# Patient Record
Sex: FEMALE | Race: WHITE | NOT HISPANIC OR LATINO | Employment: PART TIME | URBAN - NONMETROPOLITAN AREA
[De-identification: names, ages, dates, MRNs, and addresses within clinical notes are randomized per-mention and may not be internally consistent; named-entity substitution may affect disease eponyms.]

---
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
## 2020-01-08 DIAGNOSIS — F90.9 ATTENTION DEFICIT HYPERACTIVITY DISORDER (ADHD), UNSPECIFIED ADHD TYPE: ICD-10-CM

## 2020-01-08 RX ORDER — DEXTROAMPHETAMINE SACCHARATE, AMPHETAMINE ASPARTATE MONOHYDRATE, DEXTROAMPHETAMINE SULFATE AND AMPHETAMINE SULFATE 3.75; 3.75; 3.75; 3.75 MG/1; MG/1; MG/1; MG/1
15 CAPSULE, EXTENDED RELEASE ORAL DAILY
Qty: 30 CAPSULE | Refills: 0 | Status: SHIPPED | OUTPATIENT
Start: 2020-01-08 | End: 2020-01-21

## 2020-01-08 NOTE — TELEPHONE ENCOUNTER
Not on protocol, please advise.    amphetamine-dextroamphetamine (ADDERALL XR) 15 MG 24 hr capsule      Last Written Prescription Date:  12/6/19-1/5/19  Last Fill Quantity: 30,   # refills: 0  Last Office Visit: 10/21/19  Future Office visit:    Next 5 appointments (look out 90 days)    Jan 21, 2020  3:30 PM CST  (Arrive by 3:15 PM)  SHORT with Lissy Perez NP  [facility] ([facility] ) [address]  [place] [address]  [phone]           Routing refill request to provider for review/approval because:  Drug not on the [facility], P or [facility] refill protocol or controlled substance

## 2020-01-15 NOTE — PROGRESS NOTES
Subjective     Radha Mayfield is a 19 year old female who presents to clinic today for the following health issues:    HPI   Medication Followup of  Adderall 15 mg     Taking Medication as prescribed: yes    Side Effects:  None; no chest pain, shortness of breath, dizziness, syncope, insomnia, or palpitations, weight down slightly, but she recently started yoga    Medication Helping Symptoms:  yes    She denies side effects from the medication. No chest pain, shortness of breath, palpitations, insomnia, or tics. She notes that with the medication, she is able to focus better. 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 School is going well. Grades good. Work is also going well.         Patient Active Problem List   Diagnosis     Attention deficit hyperactivity disorder (ADHD)     H/O absence seizures     Past Surgical History:   Procedure Laterality Date     APPENDECTOMY       TONSILLECTOMY & ADENOIDECTOMY         Social History     Tobacco Use     Smoking status: Never Smoker     Smokeless tobacco: Never Used   Substance Use Topics     Alcohol use: Not on file     Family History   Problem Relation Age of Onset     Scoliosis Mother      Cardiovascular Mother      No Known Problems Father          Current Outpatient Medications   Medication Sig Dispense Refill     [START ON 4/13/2020] amphetamine-dextroamphetamine (ADDERALL XR) 15 MG 24 hr capsule Take 1 capsule (15 mg) by mouth daily 30 capsule 0     [START ON 3/13/2020] amphetamine-dextroamphetamine (ADDERALL XR) 15 MG 24 hr capsule Take 1 capsule (15 mg) by mouth daily 30 capsule 0     [START ON 2/11/2020] amphetamine-dextroamphetamine (ADDERALL XR) 15 MG 24 hr capsule Take 1 capsule (15 mg) by mouth daily 30 capsule 0     Allergies   Allergen Reactions     No Clinical Screening - See Comments      Oxcarbazepine      rash     Chlorine Rash       Reviewed and updated as needed this visit by Provider  Meds  Problems      "    Review of Systems   As noted in the HPI.       Objective    /74 (BP Location: Left arm, Patient Position: Sitting, Cuff Size: Adult Regular)   Pulse 80   Temp 98.1  F (36.7  C) (Tympanic)   Ht 1.6 m (5' 3\")   Wt 65.8 kg (145 lb)   LMP 01/14/2020   SpO2 99%   BMI 25.69 kg/m    Body mass index is 25.69 kg/m .  Physical Exam   GENERAL: healthy, alert and no distress  RESP: lungs clear to auscultation - no rales, rhonchi or wheezes  CV: regular rate and rhythm, normal S1 S2, no S3 or S4, no murmur, click or rub, no peripheral edema and peripheral pulses strong  NEURO: Normal strength and tone, mentation intact and speech normal  PSYCH: mentation appears normal, affect normal/bright    Diagnostic Test Results:  none         Assessment & Plan   (F90.9) Attention deficit hyperactivity disorder (ADHD), unspecified ADHD type  (primary encounter diagnosis)  Comment: well controlled with Adderall  Plan: Pt shows no signs of diversion or abuse. Up to 3 month of his/her ADD/ADHD medications given today. Pt is aware that he/she is solely responsible for protections of the prescriptions. I will not tolerate any lost or stolen prescriptions lightly. Will see pt back in office in 3 months, sooner with worsening symptoms.          BMI:   Estimated body mass index is 26.57 kg/m  as calculated from the following:    Height as of 10/21/19: 1.6 m (5' 3\").    Weight as of 10/21/19: 68 kg (150 lb).       Lissy Perez NP  [facility] [place]      "

## 2020-01-21 ENCOUNTER — OFFICE VISIT (OUTPATIENT)
Dept: FAMILY MEDICINE | Facility: OTHER | Age: 20
End: 2020-01-21
Attending: NURSE PRACTITIONER
Payer: COMMERCIAL

## 2020-01-21 VITALS
HEIGHT: 63 IN | BODY MASS INDEX: 25.69 KG/M2 | WEIGHT: 145 LBS | OXYGEN SATURATION: 99 % | SYSTOLIC BLOOD PRESSURE: 104 MMHG | HEART RATE: 80 BPM | DIASTOLIC BLOOD PRESSURE: 74 MMHG | TEMPERATURE: 98.1 F

## 2020-01-21 DIAGNOSIS — F90.9 ATTENTION DEFICIT HYPERACTIVITY DISORDER (ADHD), UNSPECIFIED ADHD TYPE: Primary | ICD-10-CM

## 2020-01-21 PROCEDURE — G0463 HOSPITAL OUTPT CLINIC VISIT: HCPCS

## 2020-01-21 PROCEDURE — 99213 OFFICE O/P EST LOW 20 MIN: CPT | Performed by: NURSE PRACTITIONER

## 2020-01-21 RX ORDER — DEXTROAMPHETAMINE SACCHARATE, AMPHETAMINE ASPARTATE MONOHYDRATE, DEXTROAMPHETAMINE SULFATE AND AMPHETAMINE SULFATE 3.75; 3.75; 3.75; 3.75 MG/1; MG/1; MG/1; MG/1
15 CAPSULE, EXTENDED RELEASE ORAL DAILY
Qty: 30 CAPSULE | Refills: 0 | Status: SHIPPED | OUTPATIENT
Start: 2020-04-13 | End: 2020-04-15

## 2020-01-21 RX ORDER — DEXTROAMPHETAMINE SACCHARATE, AMPHETAMINE ASPARTATE MONOHYDRATE, DEXTROAMPHETAMINE SULFATE AND AMPHETAMINE SULFATE 3.75; 3.75; 3.75; 3.75 MG/1; MG/1; MG/1; MG/1
15 CAPSULE, EXTENDED RELEASE ORAL DAILY
Qty: 30 CAPSULE | Refills: 0 | Status: SHIPPED | OUTPATIENT
Start: 2020-02-11 | End: 2020-03-12

## 2020-01-21 RX ORDER — DEXTROAMPHETAMINE SACCHARATE, AMPHETAMINE ASPARTATE MONOHYDRATE, DEXTROAMPHETAMINE SULFATE AND AMPHETAMINE SULFATE 3.75; 3.75; 3.75; 3.75 MG/1; MG/1; MG/1; MG/1
15 CAPSULE, EXTENDED RELEASE ORAL DAILY
Qty: 30 CAPSULE | Refills: 0 | Status: SHIPPED | OUTPATIENT
Start: 2020-03-13 | End: 2020-04-12

## 2020-01-21 ASSESSMENT — ANXIETY QUESTIONNAIRES
2. NOT BEING ABLE TO STOP OR CONTROL WORRYING: NOT AT ALL
GAD7 TOTAL SCORE: 6
IF YOU CHECKED OFF ANY PROBLEMS ON THIS QUESTIONNAIRE, HOW DIFFICULT HAVE THESE PROBLEMS MADE IT FOR YOU TO DO YOUR WORK, TAKE CARE OF THINGS AT HOME, OR GET ALONG WITH OTHER PEOPLE: SOMEWHAT DIFFICULT
3. WORRYING TOO MUCH ABOUT DIFFERENT THINGS: SEVERAL DAYS
1. FEELING NERVOUS, ANXIOUS, OR ON EDGE: SEVERAL DAYS
7. FEELING AFRAID AS IF SOMETHING AWFUL MIGHT HAPPEN: SEVERAL DAYS
5. BEING SO RESTLESS THAT IT IS HARD TO SIT STILL: SEVERAL DAYS
4. TROUBLE RELAXING: SEVERAL DAYS
6. BECOMING EASILY ANNOYED OR IRRITABLE: SEVERAL DAYS

## 2020-01-21 ASSESSMENT — PATIENT HEALTH QUESTIONNAIRE - PHQ9: SUM OF ALL RESPONSES TO PHQ QUESTIONS 1-9: 3

## 2020-01-21 ASSESSMENT — MIFFLIN-ST. JEOR: SCORE: 1401.85

## 2020-01-21 NOTE — NURSING NOTE
"Chief Complaint   Patient presents with     A.D.H.D       Initial /74 (BP Location: Left arm, Patient Position: Sitting, Cuff Size: Adult Regular)   Pulse 105   Temp 98.1  F (36.7  C) (Tympanic)   Ht 1.6 m (5' 3\")   Wt 65.8 kg (145 lb)   LMP 01/14/2020   SpO2 99%   BMI 25.69 kg/m   Estimated body mass index is 25.69 kg/m  as calculated from the following:    Height as of this encounter: 1.6 m (5' 3\").    Weight as of this encounter: 65.8 kg (145 lb).  Medication Reconciliation: complete  Barbara Sow LPN  "

## 2020-01-22 ASSESSMENT — ANXIETY QUESTIONNAIRES: GAD7 TOTAL SCORE: 6

## 2020-04-14 DIAGNOSIS — F90.9 ATTENTION DEFICIT HYPERACTIVITY DISORDER (ADHD), UNSPECIFIED ADHD TYPE: ICD-10-CM

## 2020-04-14 NOTE — TELEPHONE ENCOUNTER
Adderall      Last Written Prescription Date:  1/21/2020  Last Fill Quantity: 30,   # refills: 0  Last Office Visit: 1/21/2020  Future Office visit:    Next 5 appointments (look out 90 days)    Apr 21, 2020  8:40 AM CDT  Telephone Visit with Lissy Perez NP  [facility] ([facility] ) [address]  [place] [address]  [phone]           Routing refill request to provider for review/approval because:  Drug not on the [facility], [facility] or  Health refill protocol or controlled substance

## 2020-04-15 RX ORDER — DEXTROAMPHETAMINE SACCHARATE, AMPHETAMINE ASPARTATE MONOHYDRATE, DEXTROAMPHETAMINE SULFATE AND AMPHETAMINE SULFATE 3.75; 3.75; 3.75; 3.75 MG/1; MG/1; MG/1; MG/1
15 CAPSULE, EXTENDED RELEASE ORAL DAILY
Qty: 30 CAPSULE | Refills: 0 | Status: SHIPPED | OUTPATIENT
Start: 2020-04-15 | End: 2020-04-21

## 2020-04-20 NOTE — PROGRESS NOTES
"Radha Mayfield is a 20 year old female who is being evaluated via a billable telephone visit.      The patient has been notified of following:     \"This telephone visit will be conducted via a call between you and your physician/provider. We have found that certain health care needs can be provided without the need for a physical exam.  This service lets us provide the care you need with a short phone conversation.  If a prescription is necessary we can send it directly to your pharmacy.  If lab work is needed we can place an order for that and you can then stop by our lab to have the test done at a later time.    Telephone visits are billed at different rates depending on your insurance coverage. During this emergency period, for some insurers they may be billed the same as an in-person visit.  Please reach out to your insurance provider with any questions.    If during the course of the call the physician/provider feels a telephone visit is not appropriate, you will not be charged for this service.\"    Patient has given verbal consent for Telephone visit?  Yes    How would you like to obtain your AVS? Mail a copy    Subjective     Radha Mayfield is a 20 year old female who presents to clinic today for the following health issues:    Medication Followup of  Adderall 15 mg     Taking Medication as prescribed: yes    Side Effects:  None; no chest pain, shortness of breath, dizziness, syncope, insomnia, or palpitations; weight down slightly, but she recently started yoga    Medication Helping Symptoms:  yes     She denies side effects from the medication. No chest pain, shortness of breath, palpitations, insomnia, or tics. She notes that with the medication, she is able to focus better. 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 School is going well. Grades good. Was working, but is currently laid off with the covid-19 pandemic.     Patient Active Problem List   Diagnosis     Attention deficit " hyperactivity disorder (ADHD)     H/O absence seizures     Past Surgical History:   Procedure Laterality Date     APPENDECTOMY       TONSILLECTOMY & ADENOIDECTOMY         Social History     Tobacco Use     Smoking status: Never Smoker     Smokeless tobacco: Never Used   Substance Use Topics     Alcohol use: Not on file     Family History   Problem Relation Age of Onset     Scoliosis Mother      Cardiovascular Mother      No Known Problems Father          Current Outpatient Medications   Medication Sig Dispense Refill     amphetamine-dextroamphetamine (ADDERALL XR) 15 MG 24 hr capsule Take 1 capsule (15 mg) by mouth daily 30 capsule 0     Allergies   Allergen Reactions     No Clinical Screening - See Comments      Oxcarbazepine      rash     Chlorine Rash       Reviewed and updated as needed this visit by Provider         Review of Systems   As noted in the HPI.        Assessment/Plan:  (F90.9) Attention deficit hyperactivity disorder (ADHD), unspecified ADHD type  (primary encounter diagnosis)  Plan: ADHD well controlled. Pt shows no signs of diversion or abuse. Up to 3 month of his/her ADD/ADHD medications given today. Pt is aware that he/she is solely responsible for protections of the prescriptions. I will not tolerate any lost or stolen prescriptions lightly. Will do telephone call in 3 months.      Phone call duration:  5 minutes    Lissy Perez NP

## 2020-04-21 ENCOUNTER — VIRTUAL VISIT (OUTPATIENT)
Dept: FAMILY MEDICINE | Facility: OTHER | Age: 20
End: 2020-04-21
Attending: NURSE PRACTITIONER
Payer: COMMERCIAL

## 2020-04-21 DIAGNOSIS — F90.9 ATTENTION DEFICIT HYPERACTIVITY DISORDER (ADHD), UNSPECIFIED ADHD TYPE: Primary | ICD-10-CM

## 2020-04-21 PROCEDURE — 99441 ZZC PHYSICIAN TELEPHONE EVALUATION 5-10 MIN: CPT | Performed by: NURSE PRACTITIONER

## 2020-04-21 RX ORDER — DEXTROAMPHETAMINE SACCHARATE, AMPHETAMINE ASPARTATE MONOHYDRATE, DEXTROAMPHETAMINE SULFATE AND AMPHETAMINE SULFATE 3.75; 3.75; 3.75; 3.75 MG/1; MG/1; MG/1; MG/1
15 CAPSULE, EXTENDED RELEASE ORAL DAILY
Qty: 30 CAPSULE | Refills: 0 | Status: SHIPPED | OUTPATIENT
Start: 2020-05-12 | End: 2020-07-21

## 2020-04-21 RX ORDER — DEXTROAMPHETAMINE SACCHARATE, AMPHETAMINE ASPARTATE MONOHYDRATE, DEXTROAMPHETAMINE SULFATE AND AMPHETAMINE SULFATE 3.75; 3.75; 3.75; 3.75 MG/1; MG/1; MG/1; MG/1
15 CAPSULE, EXTENDED RELEASE ORAL DAILY
Qty: 30 CAPSULE | Refills: 0 | Status: SHIPPED | OUTPATIENT
Start: 2020-06-12 | End: 2020-07-21

## 2020-04-21 RX ORDER — DEXTROAMPHETAMINE SACCHARATE, AMPHETAMINE ASPARTATE MONOHYDRATE, DEXTROAMPHETAMINE SULFATE AND AMPHETAMINE SULFATE 3.75; 3.75; 3.75; 3.75 MG/1; MG/1; MG/1; MG/1
15 CAPSULE, EXTENDED RELEASE ORAL DAILY
Qty: 30 CAPSULE | Refills: 0 | Status: SHIPPED | OUTPATIENT
Start: 2020-07-10 | End: 2020-07-21

## 2020-04-21 NOTE — NURSING NOTE
"Chief Complaint   Patient presents with     A.D.H.D       Initial There were no vitals taken for this visit. Estimated body mass index is 25.69 kg/m  as calculated from the following:    Height as of 1/21/20: 1.6 m (5' 3\").    Weight as of 1/21/20: 65.8 kg (145 lb).  Medication Reconciliation: complete  Marlene Salgado LPN  "

## 2020-07-20 NOTE — PROGRESS NOTES
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 School is going well. Grades good. Was working, but is currently laid off with the covid-19 pandemic. Plans to restart working once they call her back.     Patient Active Problem List   Diagnosis     Attention deficit hyperactivity disorder (ADHD)     H/O absence seizures     Past Surgical History:   Procedure Laterality Date     APPENDECTOMY       TONSILLECTOMY & ADENOIDECTOMY         Social History     Tobacco Use     Smoking status: Never Smoker     Smokeless tobacco: Never Used   Substance Use Topics     Alcohol use: Not on file     Family History   Problem Relation Age of Onset     Scoliosis Mother      Cardiovascular Mother      No Known Problems Father          Current Outpatient Medications   Medication Sig Dispense Refill     amphetamine-dextroamphetamine (ADDERALL XR) 15 MG 24 hr capsule Take 1 capsule (15 mg) by mouth daily 30 capsule 0     amphetamine-dextroamphetamine (ADDERALL XR) 15 MG 24 hr capsule Take 1 capsule (15 mg) by mouth daily 30 capsule 0     Allergies   Allergen Reactions     No Clinical Screening - See Comments      Oxcarbazepine      rash     Chlorine Rash       Reviewed and updated as needed this visit by Provider  Tobacco         Review of Systems   As noted in the HPI.       Objective    /80 (BP Location: Left arm, Patient Position: Chair, Cuff Size: Adult Regular)   Pulse 88   Temp 97.8  F (36.6  C)  "(Tympanic)   Ht 1.6 m (5' 3\")   Wt 65.8 kg (145 lb)   LMP 07/15/2020   SpO2 98%   BMI 25.69 kg/m    Body mass index is 25.69 kg/m .  Physical Exam   GENERAL: healthy, alert and no distress  RESP: lungs clear to auscultation - no rales, rhonchi or wheezes  CV: regular rate and rhythm, normal S1 S2, no S3 or S4, no murmur, click or rub, no peripheral edema  NEURO: Normal strength and tone, mentation intact and speech normal  PSYCH: mentation appears normal, affect normal/bright    Diagnostic Test Results:  none         Assessment & Plan     1. Attention deficit hyperactivity disorder (ADHD), unspecified ADHD type  Well controlled. Pt shows no signs of diversion or abuse. Up to 3 month of his/her ADD/ADHD medications given today. Pt is aware that he/she is solely responsible for protections of the prescriptions. I will not tolerate any lost or stolen prescriptions lightly. Will see pt back in office in 3 months.     - amphetamine-dextroamphetamine (ADDERALL XR) 15 MG 24 hr capsule; Take 1 capsule (15 mg) by mouth daily  Dispense: 30 capsule; Refill: 0  - amphetamine-dextroamphetamine (ADDERALL XR) 15 MG 24 hr capsule; Take 1 capsule (15 mg) by mouth daily  Dispense: 30 capsule; Refill: 0  - amphetamine-dextroamphetamine (ADDERALL XR) 15 MG 24 hr capsule; Take 1 capsule (15 mg) by mouth daily  Dispense: 30 capsule; Refill: 0     BMI:   Estimated body mass index is 25.69 kg/m  as calculated from the following:    Height as of this encounter: 1.6 m (5' 3\").    Weight as of this encounter: 65.8 kg (145 lb).         Lissy Perez NP  [facility] - [place]    "

## 2020-07-21 ENCOUNTER — OFFICE VISIT (OUTPATIENT)
Dept: FAMILY MEDICINE | Facility: OTHER | Age: 20
End: 2020-07-21
Attending: NURSE PRACTITIONER
Payer: COMMERCIAL

## 2020-07-21 VITALS
TEMPERATURE: 97.8 F | BODY MASS INDEX: 25.69 KG/M2 | OXYGEN SATURATION: 98 % | DIASTOLIC BLOOD PRESSURE: 80 MMHG | HEART RATE: 88 BPM | HEIGHT: 63 IN | SYSTOLIC BLOOD PRESSURE: 110 MMHG | WEIGHT: 145 LBS

## 2020-07-21 DIAGNOSIS — F90.9 ATTENTION DEFICIT HYPERACTIVITY DISORDER (ADHD), UNSPECIFIED ADHD TYPE: Primary | ICD-10-CM

## 2020-07-21 PROCEDURE — G0463 HOSPITAL OUTPT CLINIC VISIT: HCPCS

## 2020-07-21 PROCEDURE — 99213 OFFICE O/P EST LOW 20 MIN: CPT | Performed by: NURSE PRACTITIONER

## 2020-07-21 RX ORDER — DEXTROAMPHETAMINE SACCHARATE, AMPHETAMINE ASPARTATE MONOHYDRATE, DEXTROAMPHETAMINE SULFATE AND AMPHETAMINE SULFATE 3.75; 3.75; 3.75; 3.75 MG/1; MG/1; MG/1; MG/1
15 CAPSULE, EXTENDED RELEASE ORAL DAILY
Qty: 30 CAPSULE | Refills: 0 | Status: SHIPPED | OUTPATIENT
Start: 2020-07-21 | End: 2020-10-21

## 2020-07-21 RX ORDER — DEXTROAMPHETAMINE SACCHARATE, AMPHETAMINE ASPARTATE MONOHYDRATE, DEXTROAMPHETAMINE SULFATE AND AMPHETAMINE SULFATE 3.75; 3.75; 3.75; 3.75 MG/1; MG/1; MG/1; MG/1
15 CAPSULE, EXTENDED RELEASE ORAL DAILY
Qty: 30 CAPSULE | Refills: 0 | Status: SHIPPED | OUTPATIENT
Start: 2020-09-21 | End: 2020-10-21

## 2020-07-21 RX ORDER — DEXTROAMPHETAMINE SACCHARATE, AMPHETAMINE ASPARTATE MONOHYDRATE, DEXTROAMPHETAMINE SULFATE AND AMPHETAMINE SULFATE 3.75; 3.75; 3.75; 3.75 MG/1; MG/1; MG/1; MG/1
15 CAPSULE, EXTENDED RELEASE ORAL DAILY
Qty: 30 CAPSULE | Refills: 0 | Status: SHIPPED | OUTPATIENT
Start: 2020-08-21 | End: 2020-10-21

## 2020-07-21 ASSESSMENT — MIFFLIN-ST. JEOR: SCORE: 1396.85

## 2020-07-21 ASSESSMENT — PAIN SCALES - GENERAL: PAINLEVEL: NO PAIN (0)

## 2020-07-21 NOTE — NURSING NOTE
"Chief Complaint   Patient presents with     A.D.H.D       Initial /80 (BP Location: Left arm, Patient Position: Chair, Cuff Size: Adult Regular)   Pulse 101   Temp 97.8  F (36.6  C) (Tympanic)   Ht 1.6 m (5' 3\")   Wt 65.8 kg (145 lb)   LMP 07/15/2020   SpO2 98%   BMI 25.69 kg/m   Estimated body mass index is 25.69 kg/m  as calculated from the following:    Height as of this encounter: 1.6 m (5' 3\").    Weight as of this encounter: 65.8 kg (145 lb).  Medication Reconciliation: complete  Oksana Colvin LPN  "

## 2020-09-25 ENCOUNTER — TELEPHONE (OUTPATIENT)
Dept: FAMILY MEDICINE | Facility: OTHER | Age: 20
End: 2020-09-25

## 2020-09-25 NOTE — TELEPHONE ENCOUNTER
Form received [employer] service animal paperwork,placed in [facility]'s wall bin.   After form is completed patient would like form to be put at  for , please advise pt when it is ready.

## 2020-09-28 ENCOUNTER — OFFICE VISIT (OUTPATIENT)
Dept: FAMILY MEDICINE | Facility: OTHER | Age: 20
End: 2020-09-28
Attending: NURSE PRACTITIONER
Payer: COMMERCIAL

## 2020-09-28 VITALS
DIASTOLIC BLOOD PRESSURE: 76 MMHG | TEMPERATURE: 97.8 F | WEIGHT: 148 LBS | BODY MASS INDEX: 26.22 KG/M2 | SYSTOLIC BLOOD PRESSURE: 94 MMHG | HEART RATE: 98 BPM | OXYGEN SATURATION: 98 %

## 2020-09-28 DIAGNOSIS — F43.10 PTSD (POST-TRAUMATIC STRESS DISORDER): Primary | ICD-10-CM

## 2020-09-28 DIAGNOSIS — F41.9 ANXIETY: ICD-10-CM

## 2020-09-28 PROCEDURE — 99213 OFFICE O/P EST LOW 20 MIN: CPT | Performed by: NURSE PRACTITIONER

## 2020-09-28 PROCEDURE — G0463 HOSPITAL OUTPT CLINIC VISIT: HCPCS

## 2020-09-28 ASSESSMENT — PATIENT HEALTH QUESTIONNAIRE - PHQ9: SUM OF ALL RESPONSES TO PHQ QUESTIONS 1-9: 8

## 2020-09-28 ASSESSMENT — ANXIETY QUESTIONNAIRES
5. BEING SO RESTLESS THAT IT IS HARD TO SIT STILL: SEVERAL DAYS
6. BECOMING EASILY ANNOYED OR IRRITABLE: SEVERAL DAYS
4. TROUBLE RELAXING: SEVERAL DAYS
GAD7 TOTAL SCORE: 7
IF YOU CHECKED OFF ANY PROBLEMS ON THIS QUESTIONNAIRE, HOW DIFFICULT HAVE THESE PROBLEMS MADE IT FOR YOU TO DO YOUR WORK, TAKE CARE OF THINGS AT HOME, OR GET ALONG WITH OTHER PEOPLE: SOMEWHAT DIFFICULT
3. WORRYING TOO MUCH ABOUT DIFFERENT THINGS: SEVERAL DAYS
1. FEELING NERVOUS, ANXIOUS, OR ON EDGE: SEVERAL DAYS
2. NOT BEING ABLE TO STOP OR CONTROL WORRYING: SEVERAL DAYS
7. FEELING AFRAID AS IF SOMETHING AWFUL MIGHT HAPPEN: SEVERAL DAYS

## 2020-09-28 ASSESSMENT — PAIN SCALES - GENERAL: PAINLEVEL: NO PAIN (0)

## 2020-09-28 NOTE — TELEPHONE ENCOUNTER
Called and left patient message stating she needs a telephone visit for pcp to fill out the form she is requesting. Does have some opening on Friday. awaiting patient call back.   GRACE Hurd
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
## 2020-09-28 NOTE — NURSING NOTE
"Chief Complaint   Patient presents with     Forms       Initial BP 94/76   Pulse 121   Temp 97.8  F (36.6  C) (Tympanic)   Wt 67.1 kg (148 lb)   SpO2 98%   BMI 26.22 kg/m   Estimated body mass index is 26.22 kg/m  as calculated from the following:    Height as of 7/21/20: 1.6 m (5' 3\").    Weight as of this encounter: 67.1 kg (148 lb).  Medication Reconciliation: complete  Viri Guzman MA  "

## 2020-09-28 NOTE — TELEPHONE ENCOUNTER
Appt has been scheduled with Lissy Perez:    Next 5 appointments (look out 90 days)    Sep 28, 2020  1:00 PM CDT  (Arrive by 12:45 PM)  [facility] [place] [facility] [place] [facility] [address]  [place] [address]  [phone]   Oct 21, 2020 10:30 AM CDT  (Arrive by 10:15 AM)  SHORT with Lissy Perez NP  [facility] [place] ([facility] [place] ) [address]  [place] [address]  [phone]

## 2020-09-29 ASSESSMENT — ANXIETY QUESTIONNAIRES: GAD7 TOTAL SCORE: 7

## 2020-10-19 NOTE — PROGRESS NOTES
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 School is going well. Grades good-As and Bs. Was working, but is currently laid off with the covid-19 pandemic. Has another interview today.  -She does have some anxiety and PTSD. Controlled without medication. Uses her cat to help calm her instead.     Due for chlamydia screening. Willing to get.    Declines the influenza vaccine.      Review of Systems   As noted in the HPI.       Objective    BP 96/70 (BP Location: Left arm, Patient Position: Sitting, Cuff Size: Adult Regular)   Pulse 96   Temp 98.4  F (36.9  C) (Tympanic)   Ht 1.6 m (5' 3\")   Wt 68.5 kg (151 lb)   SpO2 98%   BMI 26.75 kg/m    Body mass index is 26.75 kg/m .  Physical Exam   GENERAL: healthy, alert and no distress  RESP: lungs clear to auscultation - no rales, rhonchi or wheezes  CV: regular rate and rhythm, normal S1 S2, no S3 or S4, no murmur, click or rub, no peripheral edema  NEURO: Normal strength and tone, mentation intact and speech normal  PSYCH: mentation appears normal, affect normal/bright    Urine collected for chlamydia screen.         Assessment & Plan     Attention deficit hyperactivity disorder (ADHD), unspecified ADHD type  Pt shows no signs of diversion or abuse. Up to 3 month of his/her ADD/ADHD medications given today. Pt is aware that he/she is solely responsible for protections of the prescriptions. I will not tolerate any lost or stolen prescriptions lightly. Will see pt back in office in 3 months.     - amphetamine-dextroamphetamine " (ADDERALL XR) 15 MG 24 hr capsule; Take 1 capsule (15 mg) by mouth daily  - amphetamine-dextroamphetamine (ADDERALL XR) 15 MG 24 hr capsule; Take 1 capsule (15 mg) by mouth daily  - amphetamine-dextroamphetamine (ADDERALL XR) 15 MG 24 hr capsule; Take 1 capsule (15 mg) by mouth daily    Screen for STD (sexually transmitted disease)  - GC/Chlamydia by PCR - [place],  Will notify patient of the results when available and intervene accordingly.         Return in about 3 months (around 1/21/2021).    Lissy Perez NP  [facility] - [place]

## 2020-10-21 ENCOUNTER — OFFICE VISIT (OUTPATIENT)
Dept: FAMILY MEDICINE | Facility: OTHER | Age: 20
End: 2020-10-21
Attending: NURSE PRACTITIONER
Payer: COMMERCIAL

## 2020-10-21 VITALS
BODY MASS INDEX: 26.75 KG/M2 | HEIGHT: 63 IN | DIASTOLIC BLOOD PRESSURE: 70 MMHG | OXYGEN SATURATION: 98 % | TEMPERATURE: 98.4 F | WEIGHT: 151 LBS | SYSTOLIC BLOOD PRESSURE: 96 MMHG | HEART RATE: 96 BPM

## 2020-10-21 DIAGNOSIS — F90.9 ATTENTION DEFICIT HYPERACTIVITY DISORDER (ADHD), UNSPECIFIED ADHD TYPE: ICD-10-CM

## 2020-10-21 DIAGNOSIS — Z11.3 SCREEN FOR STD (SEXUALLY TRANSMITTED DISEASE): Primary | ICD-10-CM

## 2020-10-21 PROCEDURE — G0463 HOSPITAL OUTPT CLINIC VISIT: HCPCS | Mod: 25

## 2020-10-21 PROCEDURE — G0463 HOSPITAL OUTPT CLINIC VISIT: HCPCS

## 2020-10-21 PROCEDURE — 87491 CHLMYD TRACH DNA AMP PROBE: CPT | Mod: ZL | Performed by: NURSE PRACTITIONER

## 2020-10-21 PROCEDURE — 99213 OFFICE O/P EST LOW 20 MIN: CPT | Performed by: NURSE PRACTITIONER

## 2020-10-21 PROCEDURE — 87591 N.GONORRHOEAE DNA AMP PROB: CPT | Mod: ZL | Performed by: NURSE PRACTITIONER

## 2020-10-21 RX ORDER — DEXTROAMPHETAMINE SACCHARATE, AMPHETAMINE ASPARTATE MONOHYDRATE, DEXTROAMPHETAMINE SULFATE AND AMPHETAMINE SULFATE 3.75; 3.75; 3.75; 3.75 MG/1; MG/1; MG/1; MG/1
15 CAPSULE, EXTENDED RELEASE ORAL DAILY
Qty: 30 CAPSULE | Refills: 0 | Status: SHIPPED | OUTPATIENT
Start: 2021-01-18 | End: 2021-01-29

## 2020-10-21 RX ORDER — DEXTROAMPHETAMINE SACCHARATE, AMPHETAMINE ASPARTATE MONOHYDRATE, DEXTROAMPHETAMINE SULFATE AND AMPHETAMINE SULFATE 3.75; 3.75; 3.75; 3.75 MG/1; MG/1; MG/1; MG/1
15 CAPSULE, EXTENDED RELEASE ORAL DAILY
Qty: 30 CAPSULE | Refills: 0 | Status: SHIPPED | OUTPATIENT
Start: 2020-12-18 | End: 2021-01-29

## 2020-10-21 RX ORDER — DEXTROAMPHETAMINE SACCHARATE, AMPHETAMINE ASPARTATE MONOHYDRATE, DEXTROAMPHETAMINE SULFATE AND AMPHETAMINE SULFATE 3.75; 3.75; 3.75; 3.75 MG/1; MG/1; MG/1; MG/1
15 CAPSULE, EXTENDED RELEASE ORAL DAILY
Qty: 30 CAPSULE | Refills: 0 | Status: SHIPPED | OUTPATIENT
Start: 2020-11-18 | End: 2021-01-29

## 2020-10-21 ASSESSMENT — PAIN SCALES - GENERAL: PAINLEVEL: NO PAIN (0)

## 2020-10-21 ASSESSMENT — MIFFLIN-ST. JEOR: SCORE: 1424.06

## 2020-10-21 NOTE — NURSING NOTE
"Chief Complaint   Patient presents with     A.D.H.D       Initial BP 96/70 (BP Location: Left arm, Patient Position: Sitting, Cuff Size: Adult Regular)   Pulse 106   Temp 98.4  F (36.9  C) (Tympanic)   Ht 1.6 m (5' 3\")   Wt 68.5 kg (151 lb)   SpO2 98%   BMI 26.75 kg/m   Estimated body mass index is 26.75 kg/m  as calculated from the following:    Height as of this encounter: 1.6 m (5' 3\").    Weight as of this encounter: 68.5 kg (151 lb).  Medication Reconciliation: complete  Mirella Villanueva LPN  "

## 2020-10-22 LAB
C TRACH DNA SPEC QL NAA+PROBE: NEGATIVE
N GONORRHOEA DNA SPEC QL NAA+PROBE: NEGATIVE
SPECIMEN SOURCE: NORMAL
SPECIMEN SOURCE: NORMAL

## 2020-10-30 ENCOUNTER — NURSE TRIAGE (OUTPATIENT)
Dept: FAMILY MEDICINE | Facility: OTHER | Age: 20
End: 2020-10-30

## 2020-10-30 DIAGNOSIS — H00.016 HORDEOLUM OF LEFT EYE, UNSPECIFIED EYELID, UNSPECIFIED HORDEOLUM TYPE: Primary | ICD-10-CM

## 2020-10-30 RX ORDER — POLYMYXIN B SULFATE AND TRIMETHOPRIM 1; 10000 MG/ML; [USP'U]/ML
1-2 SOLUTION OPHTHALMIC EVERY 4 HOURS
Qty: 5 ML | Refills: 0 | Status: SHIPPED | OUTPATIENT
Start: 2020-10-30 | End: 2020-11-06

## 2020-10-30 NOTE — TELEPHONE ENCOUNTER
"Call from pt with symptoms of an eye stye. Patient states the eyelid is red and swollen, mainly at the outer corner of the eyelid. The entire eyelid is not red and swollen. See protocol for details.     If an antibiotic drop can be called in, pt is requesting [facility] in [place].     Please provide pt with an update.     Additional Information    Negative: Child sounds very sick or weak to the triager    Negative: Eyelid is very swollen with fever    Negative: Eyelid is very red with fever    Negative: Entire eyelid is red and swollen and no fever    Negative: Sty becomes larger than 1/4 inch (6 mm)    Negative: 2 or more styes are present    Negative: Sty has come to a head (pimple visible), but has not drained after 3 days    Negative: Sty present over 10 days    Negative: Styes have occurred 3 or more times    Negative: Triager thinks child needs to be seen for non-urgent problem    Negative: Caller wants child seen for non-urgent problem    Isolated sty    Answer Assessment - Initial Assessment Questions  1. LOCATION: \"Which eye has the sty?\" \"Upper or lower eyelid?\"      Left eye, upper eyelid    2. SIZE: \"How big is it?\" (Note: standard pencil eraser is 6 mm)      Standard mechanical pencil eraser    3. EYELID: \"Is the eyelid swollen?\" If so, ask: \"How much?\"      Yes    4. REDNESS: \"Has the redness spread onto the eyelid?\" If so, ask: \"How far?\"      Yes     5. ONSET: \"When did you first notice the sty?\"      10/26/20    Protocols used: STY-P-OH      "

## 2020-11-03 ENCOUNTER — OFFICE VISIT (OUTPATIENT)
Dept: FAMILY MEDICINE | Facility: OTHER | Age: 20
End: 2020-11-03
Attending: NURSE PRACTITIONER
Payer: COMMERCIAL

## 2020-11-03 VITALS
TEMPERATURE: 99.1 F | HEART RATE: 115 BPM | WEIGHT: 146 LBS | SYSTOLIC BLOOD PRESSURE: 98 MMHG | DIASTOLIC BLOOD PRESSURE: 76 MMHG | HEIGHT: 63 IN | OXYGEN SATURATION: 98 % | BODY MASS INDEX: 25.87 KG/M2

## 2020-11-03 DIAGNOSIS — H00.016 HORDEOLUM OF LEFT EYE, UNSPECIFIED EYELID, UNSPECIFIED HORDEOLUM TYPE: Primary | ICD-10-CM

## 2020-11-03 PROCEDURE — G0463 HOSPITAL OUTPT CLINIC VISIT: HCPCS

## 2020-11-03 PROCEDURE — G0463 HOSPITAL OUTPT CLINIC VISIT: HCPCS | Mod: 25

## 2020-11-03 PROCEDURE — 99213 OFFICE O/P EST LOW 20 MIN: CPT | Performed by: NURSE PRACTITIONER

## 2020-11-03 RX ORDER — CEPHALEXIN 500 MG/1
500 CAPSULE ORAL 3 TIMES DAILY
Qty: 10 CAPSULE | Refills: 0 | Status: SHIPPED | OUTPATIENT
Start: 2020-11-03 | End: 2021-01-29

## 2020-11-03 ASSESSMENT — PAIN SCALES - GENERAL: PAINLEVEL: MODERATE PAIN (5)

## 2020-11-03 ASSESSMENT — MIFFLIN-ST. JEOR: SCORE: 1401.38

## 2020-11-03 NOTE — NURSING NOTE
"Chief Complaint   Patient presents with     Stye / Hordeolum Evaluation     left eye        Initial BP 98/76 (BP Location: Left arm, Patient Position: Chair, Cuff Size: Adult Regular)   Pulse 115   Temp 99.1  F (37.3  C) (Tympanic)   Ht 1.6 m (5' 3\")   Wt 66.2 kg (146 lb)   LMP 10/27/2020   SpO2 98%   BMI 25.86 kg/m   Estimated body mass index is 25.86 kg/m  as calculated from the following:    Height as of this encounter: 1.6 m (5' 3\").    Weight as of this encounter: 66.2 kg (146 lb).  Medication Reconciliation: complete  Oksana Colvin LPN  "

## 2020-11-03 NOTE — PROGRESS NOTES
"Subjective     Radha Mayfield is a 20 year old female who presents to clinic today for the following health issues:    HPI         Concern - Left Eye Swelling  Onset: 1 week   Description: patient had swelling and erythema of left upper eye lid, called on 10/30/20 noting that she had a sty and antibiotic drops were ordered; on 10/31/20 she notes that her eye lid began draining purulent fluid, area slightly tender to the touch, no eye pain, no eye erythema, no loss of vision  Intensity: moderate  Progression of Symptoms:  improving  Accompanying Signs & Symptoms: scabbed over stye to left upper eye lid   Previous history of similar problem: no  Precipitating factors:        Worsened by:   Alleviating factors:        Improved by: polytrim   Therapies tried and outcome: polytrim, warm compress   No fevers. No nausea or vomiting.       Review of Systems   As noted in the HPI.       Objective    BP 98/76 (BP Location: Left arm, Patient Position: Chair, Cuff Size: Adult Regular)   Pulse 115   Temp 99.1  F (37.3  C) (Tympanic)   Ht 1.6 m (5' 3\")   Wt 66.2 kg (146 lb)   LMP 10/27/2020   SpO2 98%   BMI 25.86 kg/m    Body mass index is 25.86 kg/m .  Physical Exam   GENERAL: healthy, alert and no distress  EYES: PERRL, EOMI, visual fields normal and conjunctivae and sclerae normal, left upper lid with erythema and swelling, does have small open area with crusted drainage on lateral area of lid  HENT: ear canals and TM's normal, nose and mouth without ulcers or lesions  NECK: no adenopathy, no asymmetry, masses, or scars and thyroid normal to palpation  RESP: lungs clear to auscultation - no rales, rhonchi or wheezes  CV: regular rate and rhythm, normal S1 S2, no S3 or S4, no murmur, click or rub, no peripheral edema and peripheral pulses strong  NEURO: Normal strength and tone, mentation intact and speech normal  PSYCH: mentation appears normal, affect normal/bright          Assessment & Plan   (H00.016) Hordeolum of " left eye, unspecified eyelid, unspecified hordeolum type  (primary encounter diagnosis)  Plan: As noted above, left upper lid with swelling, erythema, and crusted drainage. Appears infected. Will begin Keflex TID times 10 days along with tobramycin-dexamethasone (TOBRADEX) 0.3-0.1 % ophthalmic ointment. I also contacted Dr. Linda Ornelas who agreed to see pt later this week or Monday. Patient will also apply warm compresses to the area. Will return with new or worsening symptoms.             Lissy Perez NP  [facility] - [place]

## 2021-01-26 NOTE — PROGRESS NOTES
SUBJECTIVE:   CC: Radha Mayfield is an 20 year old woman who presents for preventive health visit.       Patient has been advised of split billing requirements and indicates understanding: Yes     Healthy Habits:    Do you get at least three servings of calcium containing foods daily (dairy, green leafy vegetables, etc.)? yes    Amount of exercise or daily activities, outside of work: 2 hour(s) per day    Problems taking medications regularly No    Medication side effects: No    Have you had an eye exam in the past two years? yes    Do you see a dentist twice per year? No, encouraged to make an appointment    Do you have sleep apnea, excessive snoring or daytime drowsiness? no    Due for HIV and Hep C screening. Willing to get.     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 School is going well. Grades good-As and Bs. 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  -She does have some anxiety and PTSD. Controlled without medication. Uses her cat to help calm her instead.     Today's PHQ-2 Score:   PHQ-2 ( 1999 [employer]) 11/3/2020 7/21/2020   Q1: Little interest or pleasure in doing things 0 0   Q2: Feeling down, depressed or hopeless 0 0   PHQ-2 Score 0 0       Abuse: Current or Past(Physical, Sexual or Emotional)- No  Do you feel safe in your environment? Yes        Social History     Tobacco Use     Smoking status: Never Smoker     Smokeless tobacco: Never Used   Substance Use Topics     Alcohol use: Not on file     If you drink alcohol do you typically have >3 drinks per day or >7 drinks per week? No                     Reviewed orders with patient.  Reviewed health maintenance and updated orders  accordingly - Yes  BP Readings from Last 3 Encounters:   01/29/21 98/58   11/03/20 98/76   10/21/20 96/70    Wt Readings from Last 3 Encounters:   01/29/21 66.7 kg (147 lb)   11/03/20 66.2 kg (146 lb)   10/21/20 68.5 kg (151 lb)                  Patient Active Problem List   Diagnosis     Attention deficit hyperactivity disorder (ADHD)     H/O absence seizures     Past Surgical History:   Procedure Laterality Date     APPENDECTOMY       TONSILLECTOMY & ADENOIDECTOMY         Social History     Tobacco Use     Smoking status: Never Smoker     Smokeless tobacco: Never Used   Substance Use Topics     Alcohol use: Not on file     Family History   Problem Relation Age of Onset     Scoliosis Mother      Cardiovascular Mother      No Known Problems Father          Current Outpatient Medications   Medication Sig Dispense Refill     amphetamine-dextroamphetamine (ADDERALL XR) 15 MG 24 hr capsule Take 1 capsule (15 mg) by mouth daily 30 capsule 0       Mammogram not appropriate for this patient based on age. No family h/o breast cancer.     History of abnormal Pap smear: NO - has never had pap.      Reviewed and updated as needed this visit by clinical staff  Tobacco  Allergies  Meds  Problems  Med Hx  Surg Hx           Reviewed and updated as needed this visit by Provider  Tobacco  Allergies  Meds  Problems  Med Hx  Surg Hx          History reviewed. No pertinent past medical history.   Past Surgical History:   Procedure Laterality Date     APPENDECTOMY       TONSILLECTOMY & ADENOIDECTOMY         ROS:  CONSTITUTIONAL: NEGATIVE for fever, chills, change in weight  INTEGUMENTARU/SKIN: NEGATIVE for worrisome rashes, moles or lesions  EYES: NEGATIVE for vision changes or irritation  ENT: NEGATIVE for ear, mouth and throat problems  RESP: NEGATIVE for significant cough or SOB  BREAST: NEGATIVE for masses, tenderness or discharge  CV: NEGATIVE for chest pain, palpitations or peripheral edema  GI: NEGATIVE for nausea,  "abdominal pain, heartburn, or change in bowel habits  : NEGATIVE for unusual urinary or vaginal symptoms. Periods are regular.  MUSCULOSKELETAL: NEGATIVE for significant arthralgias or myalgia  NEURO: NEGATIVE for weakness, dizziness or paresthesias  PSYCHIATRIC: NEGATIVE for changes in mood or affect    OBJECTIVE:   BP 98/58 (BP Location: Left arm, Patient Position: Sitting, Cuff Size: Adult Regular)   Pulse 80   Temp 99  F (37.2  C) (Tympanic)   Ht 1.6 m (5' 3\")   Wt 66.7 kg (147 lb)   SpO2 99%   BMI 26.04 kg/m    EXAM:  GENERAL: healthy, alert and no distress  EYES: Eyes grossly normal to inspection, PERRL and conjunctivae and sclerae normal  HENT: ear canals and TM's normal, nose and mouth without ulcers or lesions  NECK: no adenopathy, no asymmetry, masses, or scars and thyroid normal to palpation  RESP: lungs clear to auscultation - no rales, rhonchi or wheezes  BREAST: normal without masses, tenderness or nipple discharge and no palpable axillary masses or adenopathy  CV: regular rate and rhythm, normal S1 S2, no S3 or S4, no murmur, click or rub, no peripheral edema and peripheral pulses strong  ABDOMEN: soft, nontender, no hepatosplenomegaly, no masses and bowel sounds normal   (female): normal female external genitalia, normal urethral meatus, attempted internal exam, but this was too painful for the patient.   MS: no gross musculoskeletal defects noted, no edema  SKIN: no suspicious lesions or rashes  NEURO: Normal strength and tone, mentation intact and speech normal  PSYCH: mentation appears normal, affect normal/bright    pending    ASSESSMENT/PLAN:   1. Routine general medical examination at a health care facility  Will update labs today. Did not tolerate pap. Mammogram at 40. Colonoscopy at 50. F/up yearly.     2. Attention deficit hyperactivity disorder (ADHD), unspecified ADHD type  Pt shows no signs of diversion or abuse. Up to 3 month of his/her ADD/ADHD medications given today. Pt is " "aware that he/she is solely responsible for protections of the prescriptions. I will not tolerate any lost or stolen prescriptions lightly. Will see pt back in office in 3 months.     - Basic metabolic panel  - amphetamine-dextroamphetamine (ADDERALL XR) 15 MG 24 hr capsule; Take 1 capsule (15 mg) by mouth daily  Dispense: 30 capsule; Refill: 0  - amphetamine-dextroamphetamine (ADDERALL XR) 15 MG 24 hr capsule; Take 1 capsule (15 mg) by mouth daily  Dispense: 30 capsule; Refill: 0  - amphetamine-dextroamphetamine (ADDERALL XR) 15 MG 24 hr capsule; Take 1 capsule (15 mg) by mouth daily  Dispense: 30 capsule; Refill: 0    3. PTSD (post-traumatic stress disorder)  4. Anxiety  Mental health controlled. Recheck every 6 months.     5. Screening for HIV (human immunodeficiency virus)  - HIV Antigen Antibody Combo  -Will notify patient of the results when available and intervene accordingly.     6. Encounter for hepatitis C screening test for low risk patient  - Hepatitis C Screen Reflex to HCV RNA Quant and Genotype  -Will notify patient of the results when available and intervene accordingly.     7. Screening for cervical cancer  Attempted pap. Patient did not tolerate. Will place OB/GYN REFERRAL.       Patient has been advised of split billing requirements and indicates understanding: Yes  COUNSELING:   Reviewed preventive health counseling, as reflected in patient instructions       Regular exercise       Healthy diet/nutrition       Vision screening       Hearing screening       Safe sex practices/STD prevention       Consider Hep C screening for all patients one time for ages 18-79 years       HIV screeninx in teen years, 1x in adult years, and at intervals if high risk    Estimated body mass index is 26.04 kg/m  as calculated from the following:    Height as of this encounter: 1.6 m (5' 3\").    Weight as of this encounter: 66.7 kg (147 lb).        She reports that she has never smoked. She has never used " smokeless tobacco.      Counseling Resources:  ATP IV Guidelines  Pooled Cohorts Equation Calculator  Breast Cancer Risk Calculator  BRCA-Related Cancer Risk Assessment: FHS-7 Tool  FRAX Risk Assessment  ICSI Preventive Guidelines  Dietary Guidelines for Americans, 2010  USDA's MyPlate  ASA Prophylaxis  Lung CA Screening    Lissy Perez, DIANE  [facility] [place]

## 2021-01-29 ENCOUNTER — OFFICE VISIT (OUTPATIENT)
Dept: FAMILY MEDICINE | Facility: OTHER | Age: 21
End: 2021-01-29
Attending: NURSE PRACTITIONER
Payer: COMMERCIAL

## 2021-01-29 VITALS
BODY MASS INDEX: 26.05 KG/M2 | HEIGHT: 63 IN | WEIGHT: 147 LBS | HEART RATE: 80 BPM | SYSTOLIC BLOOD PRESSURE: 98 MMHG | OXYGEN SATURATION: 99 % | DIASTOLIC BLOOD PRESSURE: 58 MMHG | TEMPERATURE: 99 F

## 2021-01-29 DIAGNOSIS — F41.9 ANXIETY: ICD-10-CM

## 2021-01-29 DIAGNOSIS — Z11.4 SCREENING FOR HIV (HUMAN IMMUNODEFICIENCY VIRUS): ICD-10-CM

## 2021-01-29 DIAGNOSIS — Z11.59 ENCOUNTER FOR HEPATITIS C SCREENING TEST FOR LOW RISK PATIENT: ICD-10-CM

## 2021-01-29 DIAGNOSIS — F90.9 ATTENTION DEFICIT HYPERACTIVITY DISORDER (ADHD), UNSPECIFIED ADHD TYPE: ICD-10-CM

## 2021-01-29 DIAGNOSIS — F43.10 PTSD (POST-TRAUMATIC STRESS DISORDER): ICD-10-CM

## 2021-01-29 DIAGNOSIS — Z12.4 SCREENING FOR CERVICAL CANCER: ICD-10-CM

## 2021-01-29 DIAGNOSIS — Z00.00 ROUTINE GENERAL MEDICAL EXAMINATION AT A HEALTH CARE FACILITY: Primary | ICD-10-CM

## 2021-01-29 LAB
ANION GAP SERPL CALCULATED.3IONS-SCNC: 5 MMOL/L (ref 3–14)
BUN SERPL-MCNC: 10 MG/DL (ref 7–30)
CALCIUM SERPL-MCNC: 9 MG/DL (ref 8.5–10.1)
CHLORIDE SERPL-SCNC: 107 MMOL/L (ref 94–109)
CO2 SERPL-SCNC: 27 MMOL/L (ref 20–32)
CREAT SERPL-MCNC: 0.58 MG/DL (ref 0.52–1.04)
GFR SERPL CREATININE-BSD FRML MDRD: >90 ML/MIN/{1.73_M2}
GLUCOSE SERPL-MCNC: 92 MG/DL (ref 70–99)
POTASSIUM SERPL-SCNC: 3.9 MMOL/L (ref 3.4–5.3)
SODIUM SERPL-SCNC: 139 MMOL/L (ref 133–144)

## 2021-01-29 PROCEDURE — 87389 HIV-1 AG W/HIV-1&-2 AB AG IA: CPT | Mod: ZL | Performed by: NURSE PRACTITIONER

## 2021-01-29 PROCEDURE — 99395 PREV VISIT EST AGE 18-39: CPT | Performed by: NURSE PRACTITIONER

## 2021-01-29 PROCEDURE — 36415 COLL VENOUS BLD VENIPUNCTURE: CPT | Mod: ZL | Performed by: NURSE PRACTITIONER

## 2021-01-29 PROCEDURE — 80048 BASIC METABOLIC PNL TOTAL CA: CPT | Mod: ZL | Performed by: NURSE PRACTITIONER

## 2021-01-29 PROCEDURE — 86803 HEPATITIS C AB TEST: CPT | Mod: ZL | Performed by: NURSE PRACTITIONER

## 2021-01-29 RX ORDER — DEXTROAMPHETAMINE SACCHARATE, AMPHETAMINE ASPARTATE MONOHYDRATE, DEXTROAMPHETAMINE SULFATE AND AMPHETAMINE SULFATE 3.75; 3.75; 3.75; 3.75 MG/1; MG/1; MG/1; MG/1
15 CAPSULE, EXTENDED RELEASE ORAL DAILY
Qty: 30 CAPSULE | Refills: 0 | Status: SHIPPED | OUTPATIENT
Start: 2021-03-18 | End: 2021-08-03

## 2021-01-29 RX ORDER — DEXTROAMPHETAMINE SACCHARATE, AMPHETAMINE ASPARTATE MONOHYDRATE, DEXTROAMPHETAMINE SULFATE AND AMPHETAMINE SULFATE 3.75; 3.75; 3.75; 3.75 MG/1; MG/1; MG/1; MG/1
15 CAPSULE, EXTENDED RELEASE ORAL DAILY
Qty: 30 CAPSULE | Refills: 0 | Status: SHIPPED | OUTPATIENT
Start: 2021-04-17 | End: 2021-04-28

## 2021-01-29 RX ORDER — DEXTROAMPHETAMINE SACCHARATE, AMPHETAMINE ASPARTATE MONOHYDRATE, DEXTROAMPHETAMINE SULFATE AND AMPHETAMINE SULFATE 3.75; 3.75; 3.75; 3.75 MG/1; MG/1; MG/1; MG/1
15 CAPSULE, EXTENDED RELEASE ORAL DAILY
Qty: 30 CAPSULE | Refills: 0 | Status: SHIPPED | OUTPATIENT
Start: 2021-02-18 | End: 2021-08-03

## 2021-01-29 ASSESSMENT — ANXIETY QUESTIONNAIRES
1. FEELING NERVOUS, ANXIOUS, OR ON EDGE: NOT AT ALL
4. TROUBLE RELAXING: NOT AT ALL
7. FEELING AFRAID AS IF SOMETHING AWFUL MIGHT HAPPEN: NOT AT ALL
5. BEING SO RESTLESS THAT IT IS HARD TO SIT STILL: NOT AT ALL
6. BECOMING EASILY ANNOYED OR IRRITABLE: NOT AT ALL
GAD7 TOTAL SCORE: 0
3. WORRYING TOO MUCH ABOUT DIFFERENT THINGS: NOT AT ALL
2. NOT BEING ABLE TO STOP OR CONTROL WORRYING: NOT AT ALL

## 2021-01-29 ASSESSMENT — PATIENT HEALTH QUESTIONNAIRE - PHQ9: SUM OF ALL RESPONSES TO PHQ QUESTIONS 1-9: 0

## 2021-01-29 ASSESSMENT — MIFFLIN-ST. JEOR: SCORE: 1400.92

## 2021-01-29 ASSESSMENT — PAIN SCALES - GENERAL: PAINLEVEL: NO PAIN (0)

## 2021-01-29 NOTE — LETTER
February 1, 2021      Radha ELVIS Tran  [address]  [place] [address]        Dear ,    We are writing to inform you of your test results.    Your test results fall within the expected range(s) or remain unchanged from previous results.  Please continue with current treatment plan.    Resulted Orders   Basic metabolic panel   Result Value Ref Range    Sodium 139 133 - 144 mmol/L    Potassium 3.9 3.4 - 5.3 mmol/L    Chloride 107 94 - 109 mmol/L    Carbon Dioxide 27 20 - 32 mmol/L    Anion Gap 5 3 - 14 mmol/L    Glucose 92 70 - 99 mg/dL    Urea Nitrogen 10 7 - 30 mg/dL    Creatinine 0.58 0.52 - 1.04 mg/dL    GFR Estimate >90 >60 mL/min/[1.73_m2]      Comment:      Non  GFR Calc  Starting 12/18/2018, serum creatinine based estimated GFR (eGFR) will be   calculated using the Chronic Kidney Disease Epidemiology Collaboration   (CKD-EPI) equation.      GFR Estimate If Black >90 >60 mL/min/[1.73_m2]      Comment:       GFR Calc  Starting 12/18/2018, serum creatinine based estimated GFR (eGFR) will be   calculated using the Chronic Kidney Disease Epidemiology Collaboration   (CKD-EPI) equation.      Calcium 9.0 8.5 - 10.1 mg/dL   HIV Antigen Antibody Combo   Result Value Ref Range    HIV Antigen Antibody Combo Nonreactive NR^Nonreactive          Comment:      HIV-1 p24 Ag & HIV-1/HIV-2 Ab Not Detected   Hepatitis C Screen Reflex to HCV RNA Quant and Genotype   Result Value Ref Range    Hepatitis C Antibody Nonreactive NR^Nonreactive      Comment:      Assay performance characteristics have not been established for newborns,   infants, and children         If you have any questions or concerns, please call the clinic at the number listed above.       Sincerely,      Lissy Perez NP

## 2021-01-29 NOTE — NURSING NOTE
"Chief Complaint   Patient presents with     Physical       Initial BP 98/58 (BP Location: Left arm, Patient Position: Sitting, Cuff Size: Adult Regular)   Pulse 117   Temp 99  F (37.2  C) (Tympanic)   Ht 1.6 m (5' 3\")   Wt 66.7 kg (147 lb)   SpO2 99%   BMI 26.04 kg/m   Estimated body mass index is 26.04 kg/m  as calculated from the following:    Height as of this encounter: 1.6 m (5' 3\").    Weight as of this encounter: 66.7 kg (147 lb).  Medication Reconciliation: complete  Mirella Villanueva LPN  "

## 2021-01-30 ASSESSMENT — ANXIETY QUESTIONNAIRES: GAD7 TOTAL SCORE: 0

## 2021-02-01 LAB
HCV AB SERPL QL IA: NONREACTIVE
HIV 1+2 AB+HIV1 P24 AG SERPL QL IA: NONREACTIVE

## 2021-02-17 ENCOUNTER — OFFICE VISIT (OUTPATIENT)
Dept: OBGYN | Facility: OTHER | Age: 21
End: 2021-02-17
Attending: NURSE PRACTITIONER
Payer: COMMERCIAL

## 2021-02-17 VITALS
BODY MASS INDEX: 26.05 KG/M2 | SYSTOLIC BLOOD PRESSURE: 112 MMHG | WEIGHT: 147 LBS | DIASTOLIC BLOOD PRESSURE: 68 MMHG | HEIGHT: 63 IN | HEART RATE: 107 BPM

## 2021-02-17 DIAGNOSIS — F43.10 POSTTRAUMATIC STRESS DISORDER: ICD-10-CM

## 2021-02-17 DIAGNOSIS — Z12.4 SCREENING FOR CERVICAL CANCER: Primary | ICD-10-CM

## 2021-02-17 DIAGNOSIS — N94.2 VAGINISMUS: ICD-10-CM

## 2021-02-17 PROBLEM — Z01.419 WELL WOMAN EXAM WITH ROUTINE GYNECOLOGICAL EXAM: Status: ACTIVE | Noted: 2021-02-17

## 2021-02-17 PROBLEM — Z01.419 WELL WOMAN EXAM WITH ROUTINE GYNECOLOGICAL EXAM: Status: RESOLVED | Noted: 2021-02-17 | Resolved: 2021-02-17

## 2021-02-17 PROCEDURE — G0463 HOSPITAL OUTPT CLINIC VISIT: HCPCS

## 2021-02-17 PROCEDURE — 99203 OFFICE O/P NEW LOW 30 MIN: CPT | Performed by: ADVANCED PRACTICE MIDWIFE

## 2021-02-17 PROCEDURE — G0463 HOSPITAL OUTPT CLINIC VISIT: HCPCS | Mod: 25

## 2021-02-17 ASSESSMENT — ANXIETY QUESTIONNAIRES
5. BEING SO RESTLESS THAT IT IS HARD TO SIT STILL: NOT AT ALL
3. WORRYING TOO MUCH ABOUT DIFFERENT THINGS: NEARLY EVERY DAY
IF YOU CHECKED OFF ANY PROBLEMS ON THIS QUESTIONNAIRE, HOW DIFFICULT HAVE THESE PROBLEMS MADE IT FOR YOU TO DO YOUR WORK, TAKE CARE OF THINGS AT HOME, OR GET ALONG WITH OTHER PEOPLE: SOMEWHAT DIFFICULT
6. BECOMING EASILY ANNOYED OR IRRITABLE: MORE THAN HALF THE DAYS
1. FEELING NERVOUS, ANXIOUS, OR ON EDGE: SEVERAL DAYS
2. NOT BEING ABLE TO STOP OR CONTROL WORRYING: MORE THAN HALF THE DAYS
7. FEELING AFRAID AS IF SOMETHING AWFUL MIGHT HAPPEN: MORE THAN HALF THE DAYS
4. TROUBLE RELAXING: NOT AT ALL
GAD7 TOTAL SCORE: 10

## 2021-02-17 ASSESSMENT — PAIN SCALES - GENERAL: PAINLEVEL: NO PAIN (0)

## 2021-02-17 ASSESSMENT — MIFFLIN-ST. JEOR: SCORE: 1400.92

## 2021-02-17 ASSESSMENT — PATIENT HEALTH QUESTIONNAIRE - PHQ9: SUM OF ALL RESPONSES TO PHQ QUESTIONS 1-9: 12

## 2021-02-17 NOTE — CONFIDENTIAL NOTE
Radha is a 21 year old G0 female who presents for annual exam.     No LMP recorded.  Menses:  Cycles 28 days When did they start 11 How many days bleed 7 days with 3 heavy pain mild cramping Contraception abstinence .  Planning pregnancy: n  Concerns in addition to routine health maintenance?  none.  GYNECOLOGIC HISTORY:   Surgery: n  History sexually transmitted infections:No STD history   Safe?  y  STI testing offered?  y  History of abnormal Pap smear: na  Problems with sex? (bleeding/pain) na  Family history of: breast cancer: n   Uterine cancer: n ovarian cancer: n   colon cancer: n    HEALTH MAINTENANCE:  Cholesterol: (No results found for: CHOL History of abnormal lipids: n  Mammo: n . History of abnormal Mammo: na   Regular Self Breast Exams: n Calcium/Vitamin D or Vitamin: n Exercise n    TSH: (No results found for: TSH )  Pap; (No results found for: PAP )    HISTORY:  OB History   No obstetric history on file.     No past medical history on file.  Past Surgical History:   Procedure Laterality Date     APPENDECTOMY       TONSILLECTOMY & ADENOIDECTOMY       Family History   Problem Relation Age of Onset     Scoliosis Mother      Cardiovascular Mother      No Known Problems Father      Social History     Socioeconomic History     Marital status: Single     Spouse name: None     Number of children: None     Years of education: None     Highest education level: None   Occupational History     None   Social Needs     Financial resource strain: None     Food insecurity     Worry: None     Inability: None     Transportation needs     Medical: None     Non-medical: None   Tobacco Use     Smoking status: Never Smoker     Smokeless tobacco: Never Used   Substance and Sexual Activity     Alcohol use: None     Drug use: None     Sexual activity: Not Currently   Lifestyle     Physical activity     Days per week: None     Minutes per session: None     Stress: None   Relationships     Social connections     Talks on  phone: None     Gets together: None     Attends Sikhism service: None     Active member of club or organization: None     Attends meetings of clubs or organizations: None     Relationship status: None     Intimate partner violence     Fear of current or ex partner: None     Emotionally abused: None     Physically abused: None     Forced sexual activity: None   Other Topics Concern     None   Social History Narrative    10/21/2019: lives in [place], she has graduated  and is attending [employer], going to school for graphic design       Current Outpatient Medications:      [START ON 2/18/2021] amphetamine-dextroamphetamine (ADDERALL XR) 15 MG 24 hr capsule, Take 1 capsule (15 mg) by mouth daily, Disp: 30 capsule, Rfl: 0     [START ON 3/18/2021] amphetamine-dextroamphetamine (ADDERALL XR) 15 MG 24 hr capsule, Take 1 capsule (15 mg) by mouth daily, Disp: 30 capsule, Rfl: 0     [START ON 4/17/2021] amphetamine-dextroamphetamine (ADDERALL XR) 15 MG 24 hr capsule, Take 1 capsule (15 mg) by mouth daily, Disp: 30 capsule, Rfl: 0     Allergies   Allergen Reactions     No Clinical Screening - See Comments      Oxcarbazepine      rash     Chlorine Rash       Past medical, surgical, social and family history were reviewed and updated in [facility].    ROS:    CONSTITUTIONAL:     NEGATIVE for fever, chills, change in weight  INTEGUMENTARY/SKIN:       NEGATIVE for worrisome rashes, moles or lesions.  Hx of eczema   EYES:     NEGATIVE for vision changes or irritation  ENT/MOUTH: NEGATIVE for ear, mouth and throat problems  RESP:     NEGATIVE for significant cough or SOB  CV:   NEGATIVE for chest pain, palpitations or peripheral edema  GI:     NEGATIVE for nausea, abdominal pain, heartburn, or change in bowel habits  :   NEGATIVE for frequency, dysuria, hematuria, vaginal discharge, or irregular bleeding,incontinence   MUSCULOSKELETAL:     NEGATIVE for significant arthralgias or myalgia  NEURO:      NEGATIVE for weakness, dizziness or  "paresthesias  ENDOCRINE:      NEGATIVE for temperature intolerance, skin/hair changes  PSYCHIATRIC:      NEGATIVE for changes in mood or affect.     EXAM:   /68   Pulse 107   Ht 1.6 m (5' 3\")   Wt 66.7 kg (147 lb)   BMI 26.04 kg/m     BMI: Body mass index is 26.04 kg/m .  Constitutional: healthy, alert and no distress  Vulva:  No external lesions, normal female hair distribution, no inguinal adenopathy.    Urethra:  Midline, non-tender, well supported, no discharge  Vagina:  Moist, pink, no abnormal discharge, no lesions  Rectal Exam: deferred  Musculoskeletal: extremities normal  Skin: no suspicious lesions or rashes  Psychiatric: Affect appropriate, cooperative,mentation appears normal.     COUNSELING:   regular exercise  healthy diet/nutrition  Immunizations   contraception  family planning  Folic Acid Counseling   reports that she has never smoked. She has never used smokeless tobacco.  Tobacco Cessation Action Plan: na  Body mass index is 26.04 kg/m .  Weight management plan: healthy diet and exercise  FRAX Risk Assessment    ASSESSMENT:  21 year old female with satisfactory annual exam  Need of cervical cancer screening  Hx of PTSD  Reports unable to put tampons in  Reports unable to have speculum exam  Unable to complete speculum exam   Possible vaginismus    Same sex relationship with transgender pt  PLAN:   Referral to sex therapist r/t past trauma and possible vaginismus     Return to office: 1 year for well woman care and as needed    30 minutes spent on the date of the encounter doing chart review, history and exam, documentation and further activities as noted above     ANUP Tafoya, ROLANDOM  "

## 2021-02-17 NOTE — PROGRESS NOTES
"Chief Complaint   Patient presents with     Gyn Exam       Initial There were no vitals taken for this visit. Estimated body mass index is 26.04 kg/m  as calculated from the following:    Height as of 1/29/21: 1.6 m (5' 3\").    Weight as of 1/29/21: 66.7 kg (147 lb).  Medication Reconciliation: complete  Tracie Baum LPN    "

## 2021-02-17 NOTE — PATIENT INSTRUCTIONS
Return to office in one year for well woman care and as needed.    Thank you for allowing Nico HEBERT CNM and our OB team to participate in your care.  If you have a scheduling or an appointment question please contact [facility] Coordinator at their direct line [phone].   ALL nursing questions or concerns can be directed to your OB nurse at: [phone] Sharath Cedillo/Tracie

## 2021-02-18 ASSESSMENT — ANXIETY QUESTIONNAIRES: GAD7 TOTAL SCORE: 10

## 2021-04-07 ENCOUNTER — IMMUNIZATION (OUTPATIENT)
Dept: FAMILY MEDICINE | Facility: OTHER | Age: 21
End: 2021-04-07
Attending: NURSE PRACTITIONER
Payer: COMMERCIAL

## 2021-04-07 PROCEDURE — 91300 PR COVID VAC PFIZER DIL RECON 30 MCG/0.3 ML IM: CPT

## 2021-04-26 ENCOUNTER — IMMUNIZATION (OUTPATIENT)
Dept: FAMILY MEDICINE | Facility: OTHER | Age: 21
End: 2021-04-26
Attending: FAMILY MEDICINE
Payer: COMMERCIAL

## 2021-04-26 PROCEDURE — 0002A PR COVID VAC PFIZER DIL RECON 30 MCG/0.3 ML IM: CPT

## 2021-04-27 RX ORDER — DEXTROAMPHETAMINE SACCHARATE, AMPHETAMINE ASPARTATE MONOHYDRATE, DEXTROAMPHETAMINE SULFATE AND AMPHETAMINE SULFATE 3.75; 3.75; 3.75; 3.75 MG/1; MG/1; MG/1; MG/1
15 CAPSULE, EXTENDED RELEASE ORAL DAILY
Qty: 30 CAPSULE | Refills: 0 | Status: CANCELLED | OUTPATIENT
Start: 2021-05-17

## 2021-04-27 NOTE — PROGRESS NOTES
Radha is a 21 year old who is being evaluated via a billable telephone visit.      What phone number would you like to be contacted at? [phone]  How would you like to obtain your AVS? Mail a copy    Assessment & Plan     Attention deficit hyperactivity disorder (ADHD), unspecified ADHD type  Pt shows no signs of diversion or abuse. Up to 3 month of his/her ADD/ADHD medications given today. Pt is aware that he/she is solely responsible for protections of the prescriptions. I will not tolerate any lost or stolen prescriptions lightly. Will see pt back in office in 3 months. Stimulant agreement up to date.     - amphetamine-dextroamphetamine (ADDERALL XR) 15 MG 24 hr capsule; Take 1 capsule (15 mg) by mouth daily  - amphetamine-dextroamphetamine (ADDERALL XR) 15 MG 24 hr capsule; Take 1 capsule (15 mg) by mouth daily  - amphetamine-dextroamphetamine (ADDERALL XR) 15 MG 24 hr capsule; Take 1 capsule (15 mg) by mouth daily    PTSD (post-traumatic stress disorder)  Anxiety  Anxiety slightly worse. No thoughts of suicide. Declines to begin medications. [facility] F/up 3 months, sooner with new or worsening symptoms.     Generalized muscle ache  Most likely from the Covid vaccine. Patient reassured. Symptoms improving. Return with new or worsening symptoms.       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 School is going well. Grades good-As and Bs. 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   -She does have some  anxiety and PTSD. Slightly worse. Recently reached out to [facility] to begin cousneling. Feels this would help. Also uses her cat to help calm her instead.     Received her 2nd COVID vaccine on 4-. Now has a headache with muscle aches. Taking Tylenol with minimal relief.  Does states that she is feeling a bit better today. No fevers. No chest pain. No shortness of breath. No cough or nasal congestion.        Review of Systems   As noted in the HPI.       Objective           Vitals:  No vitals were obtained today due to virtual visit.    Physical Exam   healthy, alert and no distress  PSYCH: Alert and oriented times 3; coherent speech, normal   rate and volume, able to articulate logical thoughts, able   to abstract reason, no tangential thoughts, no hallucinations   or delusions  Her affect is normal  RESP: No cough, no audible wheezing, able to talk in full sentences  Remainder of exam unable to be completed due to telephone visits        Phone call duration: 5 minutes

## 2021-04-28 ENCOUNTER — VIRTUAL VISIT (OUTPATIENT)
Dept: FAMILY MEDICINE | Facility: OTHER | Age: 21
End: 2021-04-28
Attending: NURSE PRACTITIONER
Payer: COMMERCIAL

## 2021-04-28 DIAGNOSIS — F41.9 ANXIETY: ICD-10-CM

## 2021-04-28 DIAGNOSIS — F90.9 ATTENTION DEFICIT HYPERACTIVITY DISORDER (ADHD), UNSPECIFIED ADHD TYPE: Primary | ICD-10-CM

## 2021-04-28 DIAGNOSIS — M79.10 GENERALIZED MUSCLE ACHE: ICD-10-CM

## 2021-04-28 DIAGNOSIS — F43.10 PTSD (POST-TRAUMATIC STRESS DISORDER): ICD-10-CM

## 2021-04-28 PROCEDURE — 99213 OFFICE O/P EST LOW 20 MIN: CPT | Mod: 95 | Performed by: NURSE PRACTITIONER

## 2021-04-28 PROCEDURE — G0463 HOSPITAL OUTPT CLINIC VISIT: HCPCS | Mod: 25,TEL

## 2021-04-28 RX ORDER — DEXTROAMPHETAMINE SACCHARATE, AMPHETAMINE ASPARTATE MONOHYDRATE, DEXTROAMPHETAMINE SULFATE AND AMPHETAMINE SULFATE 3.75; 3.75; 3.75; 3.75 MG/1; MG/1; MG/1; MG/1
15 CAPSULE, EXTENDED RELEASE ORAL DAILY
Qty: 30 CAPSULE | Refills: 0 | Status: SHIPPED | OUTPATIENT
Start: 2021-06-17 | End: 2021-08-03

## 2021-04-28 RX ORDER — DEXTROAMPHETAMINE SACCHARATE, AMPHETAMINE ASPARTATE MONOHYDRATE, DEXTROAMPHETAMINE SULFATE AND AMPHETAMINE SULFATE 3.75; 3.75; 3.75; 3.75 MG/1; MG/1; MG/1; MG/1
15 CAPSULE, EXTENDED RELEASE ORAL DAILY
Qty: 30 CAPSULE | Refills: 0 | Status: SHIPPED | OUTPATIENT
Start: 2021-07-16 | End: 2021-08-03

## 2021-04-28 RX ORDER — DEXTROAMPHETAMINE SACCHARATE, AMPHETAMINE ASPARTATE MONOHYDRATE, DEXTROAMPHETAMINE SULFATE AND AMPHETAMINE SULFATE 3.75; 3.75; 3.75; 3.75 MG/1; MG/1; MG/1; MG/1
15 CAPSULE, EXTENDED RELEASE ORAL DAILY
Qty: 30 CAPSULE | Refills: 0 | Status: SHIPPED | OUTPATIENT
Start: 2021-05-17 | End: 2021-08-03

## 2021-04-28 NOTE — NURSING NOTE
"Chief Complaint   Patient presents with     A.D.H.D       Initial There were no vitals taken for this visit. Estimated body mass index is 26.04 kg/m  as calculated from the following:    Height as of 2/17/21: 1.6 m (5' 3\").    Weight as of 2/17/21: 66.7 kg (147 lb).  Medication Reconciliation: complete  Oksana Colvin LPN  "

## 2021-06-21 ENCOUNTER — TRANSFERRED RECORDS (OUTPATIENT)
Dept: HEALTH INFORMATION MANAGEMENT | Facility: CLINIC | Age: 21
End: 2021-06-21

## 2021-07-27 NOTE — PROGRESS NOTES
"[id number]      Assessment & Plan     Attention deficit hyperactivity disorder (ADHD), unspecified ADHD type  Pt shows no signs of diversion or abuse. Up to 3 month of his/her ADD/ADHD medications given today. Pt is aware that he/she is solely responsible for protections of the prescriptions. I will not tolerate any lost or stolen prescriptions lightly.  reviewed and accurate. Will see pt back in office in 3 months. Stimulant agreement up-dated. Will also update her urine drug screen. Will notify patient of the results when available and intervene accordingly.     - amphetamine-dextroamphetamine (ADDERALL XR) 15 MG 24 hr capsule; Take 1 capsule (15 mg) by mouth daily  - amphetamine-dextroamphetamine (ADDERALL XR) 15 MG 24 hr capsule; Take 1 capsule (15 mg) by mouth daily  - amphetamine-dextroamphetamine (ADDERALL XR) 15 MG 24 hr capsule; Take 1 capsule (15 mg) by mouth daily    PTSD (post-traumatic stress disorder)  Anxiety  Controlled with counseling through [facility]. Will continue. Recheck in 3 months, sooner with new or worsening symptoms.     [id number]}     BMI:   Estimated body mass index is 26.15 kg/m  as calculated from the following:    Height as of this encounter: 1.6 m (5' 3\").    Weight as of this encounter: 67 kg (147 lb 9.6 oz).       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 School is going well. Grades good-As and Bs. On summer break from working, plans to restart working at the [employer] within the next " "month.   -She does have some anxiety and PTSD. Seeing a counselor at [facility]. Feels this is helping. No thoughts of suicide or homicide.     Review of Systems   As noted in the HPI.       Objective    /70 (BP Location: Right arm, Patient Position: Chair, Cuff Size: Adult Regular)   Temp 98.4  F (36.9  C) (Tympanic)   Ht 1.6 m (5' 3\")   Wt 67 kg (147 lb 9.6 oz)   LMP 07/27/2021   BMI 26.15 kg/m    Body mass index is 26.15 kg/m .  Physical Exam   GENERAL: healthy, alert and no distress  EYES: Eyes grossly normal to inspection, PERRL and conjunctivae and sclerae normal  HENT: ear canals and TM's normal, nose and mouth without ulcers or lesions  NECK: no adenopathy, no asymmetry, masses, or scars and thyroid normal to palpation  RESP: lungs clear to auscultation - no rales, rhonchi or wheezes  CV: regular rate and rhythm, no murmur, no peripheral edema and peripheral pulses strong  ABDOMEN: soft, nontender, no masses and bowel sounds normal  MS: no gross musculoskeletal defects noted, no edema  NEURO: Normal strength and tone, mentation intact and speech normal  PSYCH: mentation appears normal, affect normal/bright            "

## 2021-08-03 ENCOUNTER — OFFICE VISIT (OUTPATIENT)
Dept: FAMILY MEDICINE | Facility: OTHER | Age: 21
End: 2021-08-03
Attending: NURSE PRACTITIONER
Payer: COMMERCIAL

## 2021-08-03 VITALS
HEART RATE: 80 BPM | BODY MASS INDEX: 26.15 KG/M2 | DIASTOLIC BLOOD PRESSURE: 70 MMHG | SYSTOLIC BLOOD PRESSURE: 108 MMHG | TEMPERATURE: 98.4 F | WEIGHT: 147.6 LBS | HEIGHT: 63 IN

## 2021-08-03 DIAGNOSIS — F41.9 ANXIETY: ICD-10-CM

## 2021-08-03 DIAGNOSIS — F90.9 ATTENTION DEFICIT HYPERACTIVITY DISORDER (ADHD), UNSPECIFIED ADHD TYPE: Primary | ICD-10-CM

## 2021-08-03 DIAGNOSIS — F43.10 PTSD (POST-TRAUMATIC STRESS DISORDER): ICD-10-CM

## 2021-08-03 LAB — CREAT UR-MCNC: 246 MG/DL

## 2021-08-03 PROCEDURE — G0463 HOSPITAL OUTPT CLINIC VISIT: HCPCS | Mod: 25

## 2021-08-03 PROCEDURE — G0463 HOSPITAL OUTPT CLINIC VISIT: HCPCS

## 2021-08-03 PROCEDURE — 80307 DRUG TEST PRSMV CHEM ANLYZR: CPT | Mod: ZL | Performed by: NURSE PRACTITIONER

## 2021-08-03 PROCEDURE — 82570 ASSAY OF URINE CREATININE: CPT | Mod: ZL | Performed by: NURSE PRACTITIONER

## 2021-08-03 PROCEDURE — 99213 OFFICE O/P EST LOW 20 MIN: CPT | Performed by: NURSE PRACTITIONER

## 2021-08-03 RX ORDER — DEXTROAMPHETAMINE SACCHARATE, AMPHETAMINE ASPARTATE MONOHYDRATE, DEXTROAMPHETAMINE SULFATE AND AMPHETAMINE SULFATE 3.75; 3.75; 3.75; 3.75 MG/1; MG/1; MG/1; MG/1
15 CAPSULE, EXTENDED RELEASE ORAL DAILY
Qty: 30 CAPSULE | Refills: 0 | Status: SHIPPED | OUTPATIENT
Start: 2021-08-16 | End: 2022-02-04

## 2021-08-03 RX ORDER — DEXTROAMPHETAMINE SACCHARATE, AMPHETAMINE ASPARTATE MONOHYDRATE, DEXTROAMPHETAMINE SULFATE AND AMPHETAMINE SULFATE 3.75; 3.75; 3.75; 3.75 MG/1; MG/1; MG/1; MG/1
15 CAPSULE, EXTENDED RELEASE ORAL DAILY
Qty: 30 CAPSULE | Refills: 0 | Status: SHIPPED | OUTPATIENT
Start: 2021-09-16 | End: 2022-02-04

## 2021-08-03 RX ORDER — DEXTROAMPHETAMINE SACCHARATE, AMPHETAMINE ASPARTATE MONOHYDRATE, DEXTROAMPHETAMINE SULFATE AND AMPHETAMINE SULFATE 3.75; 3.75; 3.75; 3.75 MG/1; MG/1; MG/1; MG/1
15 CAPSULE, EXTENDED RELEASE ORAL DAILY
Qty: 30 CAPSULE | Refills: 0 | Status: SHIPPED | OUTPATIENT
Start: 2021-10-16 | End: 2021-11-03

## 2021-08-03 ASSESSMENT — PATIENT HEALTH QUESTIONNAIRE - PHQ9: SUM OF ALL RESPONSES TO PHQ QUESTIONS 1-9: 10

## 2021-08-03 ASSESSMENT — MIFFLIN-ST. JEOR: SCORE: 1403.64

## 2021-08-03 ASSESSMENT — PAIN SCALES - GENERAL: PAINLEVEL: NO PAIN (0)

## 2021-08-03 NOTE — LETTER
[facility]  08/03/21  Patient: Radha Mayfield  YOB: 2000  Medical Record Number: [id number]                                                                                  Non-Opioid Controlled Substance Agreement    This is an agreement between you and your provider regarding safe and appropriate use of controlled substances prescribed by your care team. Controlled substances are?medicines that can cause physical and mental dependence (abuse).     There are strict laws about having and using these medicines. We here at [facility] are  committed to working with you in your efforts to get better. To support you in this work, we'll help you schedule regular office appointments for medicine refills. If we must cancel or change your appointment for any reason, we'll make sure you have enough medicine to last until your next appointment.     As a Provider, I will:     Listen carefully to your concerns while treating you with respect.     Recommend a treatment plan that I believe is in your best interest and may involve therapies other than medicine.      Talk with you often about the possible benefits and the risk of harm of any medicine that we prescribe for you.    Assess the safety of this medicine and check how well it works.      Provide a plan on how to taper (discontinue or go off) using this medicine if the decision is made to stop its use.      ::  As a Patient, I understand controlled substances:       Are prescribed by my care provider to help me function or work and manage my condition(s).?    Are strong medicines and can cause serious side effects.       Need to be taken exactly as prescribed.?Combining controlled substances with certain medicines or chemicals (such as illegal drugs, alcohol, sedatives, sleeping pills, and benzodiazepines) can be dangerous or even fatal.? If I stop taking my medicines suddenly, I may have severe withdrawal symptoms.     The risks, benefits,  and side effects of these medicine(s) were explained to me. I agree that:    1. I will take part in other treatments as advised by my care team. This may be psychiatry or counseling, physical therapy, behavioral therapy, group treatment or a referral to specialist.    2. I will keep all my appointments and understand this is part of the monitoring of controlled substances.?My care team may require an office visit for EVERY controlled substance refill. If I miss appointments or don t follow instructions, my care team may stop my medicine    3. I will take my medicines as prescribed. I will not change the dose or schedule unless my care team tells me to. There will be no refills if I run out early.      4. I may be asked to come to the clinic and complete a urine drug test or complete a pill count. If I don t give a urine sample or participate in a pill count, the care team may stop my medicine.    5. I will only receive controlled substance prescriptions from this clinic. If I am treated by another provider, I will tell them that I am taking controlled substances and that I have a treatment agreement with this provider. I will inform my [facility] care team within one business day if I am given a prescription for any controlled substance by another healthcare provider. My [facility] care team can contact other providers and pharmacists about my use of any medicines.    6. It is up to me to make sure that I don't run out of my medicines on weekends or holidays.?If my care team is willing to refill my prescription without a visit, I must request refills only during office hours. Refills may take up to 3 business days to process. I will use one pharmacy to fill all my controlled substance prescriptions. I will notify the clinic about any changes to my insurance or medicine availability.    7. I am responsible for my prescriptions. If the medicine/prescription is lost, stolen or destroyed, it will not be  replaced.?I also agree not to share controlled substance medicines with anyone.     8. I am aware I should not use any illegal or recreational drugs. I agree not to drink alcohol unless my care team says I can.     9. If I enroll in the [place] Medical Cannabis program, I will tell my care team before my next refill.    10. I will tell my care team right away if I become pregnant, have a new medical problem treated outside of my regular clinic, or have a change in my medicines.     11. I understand that this medicine can affect my thinking, judgment and reaction time.? Alcohol and drugs affect the brain and body, which can affect the safety of my driving. Being under the influence of alcohol or drugs can affect my decision-making, behaviors, personal safety and the safety of others. Driving while impaired (DWI) can occur if a person is driving, operating or in physical control of a car, motorcycle, boat, snowmobile, ATV, motorbike, off-road vehicle or any other motor vehicle ([place] Statute 169A.20). I understand the risk if I choose to drive or operate any vehicle or machinery.    I understand that if I do not follow any of the conditions above, my prescriptions or treatment may be stopped or changed.   I agree that my provider, clinic care team and pharmacy may work with any city, state or federal law enforcement agency that investigates the misuse, sale or other diversion of my controlled medicine. I will allow my provider to discuss my care with, or share a copy of, this agreement with any other treating provider, pharmacy or emergency room where I receive care.     I have read this agreement and have asked questions about anything I did not understand.    ________________________________________________________  Patient Signature - Radha Mayfield     ___________________                   Date     ________________________________________________________  Provider Signature - Lissy Perez NP        ___________________                   Date     ________________________________________________________  Witness Signature (required if provider not present while patient signing)          ___________________                   Date

## 2021-08-06 LAB
AMPHET UR CFM-MCNC: 9240 NG/ML
AMPHET/CREAT UR: 3756 NG/MG {CREAT}
CREATININE URINE MG/DL  (SYNCED VALUE): 246 MG/DL

## 2021-10-03 ENCOUNTER — HEALTH MAINTENANCE LETTER (OUTPATIENT)
Age: 21
End: 2021-10-03

## 2021-11-01 NOTE — PROGRESS NOTES
Assessment & Plan     Attention deficit hyperactivity disorder (ADHD), unspecified ADHD type  - Continue with Adderall XR 15mg 24 hr capsule daily.   - Continue monitoring weight and heart rate     PTSD (post-traumatic stress disorder), Anxiety  - Continue with therapy with Jennifer Carranza. Next appointment 11/11/21.     Seasonal Allergies   - Start Zyrtec 10mg daily  - Follow up phone visit in 4 weeks   - Will consider Singulair if no relief with Zyrtec, then consider allergy testing      Phone visit in 4 weeks to assess allergies after starting Zyrtec.   ADHD follow up in 3 months.     Louise Carey, Nurse Practitioner Student   [facility]      I was present with the nurse practitioner student who participated in the service and in the documentation of the note. I have verified the history and personally performed the physical exam and medical decision making. I agree with the assessment and plan of care as documented in the note.     Lissy Perez, ANIVAL Perez, NP  [facility] - [place] Garcia is a 21 year old who presents for the following health issues     HPI     ADHD:   Medication Followup of  Adderall XR 15 mg once daily    Taking Medication as prescribed: yes,     Side Effects:  None; no chest pain, shortness of breath, dizziness, syncope, insomnia, or palpitations; weight stable   Medication Helping Symptoms:  yes, able to stay on task and focus well.    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 School is going well. Grades good-As and Bs. Has been working at the [employer].    -She does have some anxiety and PTSD. Seeing a counselor at [facility]. Feels this is helping. No thoughts of suicide or homicide.Some anxiety but is going to therapy. Seeing Jennifer Carranza in [place], [place] for anxiety and states that it is going well. Next appointment 11/11/21.       Due for chlamydia screening. Collected today. Denies vaginal  "discharge.     Due for the influenza vaccine. Declines today     Increase in sneezing, congestion, and rhinorrhea to the point of nose bleeding. Had been taking OTC allergy medication without relief, has not been taking due to the medication not working for her.       Review of Systems   Constitutional: Negative for activity change, appetite change, chills, fatigue and fever.   HENT: Positive for congestion, rhinorrhea and sneezing.    Respiratory: Negative for cough, chest tightness, shortness of breath and wheezing.    Cardiovascular: Negative for chest pain, palpitations and peripheral edema.   Gastrointestinal: Negative for abdominal pain, constipation, diarrhea, nausea and vomiting.   Endocrine: Negative for cold intolerance and heat intolerance.   Genitourinary: Negative for dysuria and vaginal discharge.   Musculoskeletal: Negative for arthralgias.   Neurological: Negative for dizziness and headaches.   Psychiatric/Behavioral: Negative for agitation, mood changes and suicidal ideas. The patient is not hyperactive.         Objective    /64 (BP Location: Left arm, Patient Position: Sitting, Cuff Size: Adult Regular)   Pulse 68   Temp 97.8  F (36.6  C) (Tympanic)   Resp 16   Ht 1.6 m (5' 3\")   Wt 68.9 kg (152 lb)   SpO2 98%   BMI 26.93 kg/m    Body mass index is 26.93 kg/m .  Physical Exam  Constitutional:       General: She is not in acute distress.     Appearance: Normal appearance. She is normal weight. She is not ill-appearing.   Eyes:      General: Lids are normal. Vision grossly intact.   Cardiovascular:      Rate and Rhythm: Normal rate and regular rhythm.      Pulses: Normal pulses.      Heart sounds: Normal heart sounds, S1 normal and S2 normal. No murmur heard.     Pulmonary:      Effort: Pulmonary effort is normal. No tachypnea, bradypnea or respiratory distress.      Breath sounds: Normal breath sounds. No decreased air movement. No decreased breath sounds, wheezing or rhonchi. "   Musculoskeletal:      Right lower leg: No edema.      Left lower leg: No edema.   Neurological:      Mental Status: She is alert.   Psychiatric:         Mood and Affect: Mood is anxious. Mood is not depressed. Affect is not labile.         Speech: Speech is not rapid and pressured, delayed or slurred.         Behavior: Behavior is not agitated, slowed, withdrawn or hyperactive. Behavior is cooperative.         Cognition and Memory: Cognition and memory normal.

## 2021-11-03 ENCOUNTER — OFFICE VISIT (OUTPATIENT)
Dept: FAMILY MEDICINE | Facility: OTHER | Age: 21
End: 2021-11-03
Attending: NURSE PRACTITIONER
Payer: COMMERCIAL

## 2021-11-03 VITALS
OXYGEN SATURATION: 98 % | TEMPERATURE: 97.8 F | DIASTOLIC BLOOD PRESSURE: 64 MMHG | SYSTOLIC BLOOD PRESSURE: 104 MMHG | HEIGHT: 63 IN | WEIGHT: 152 LBS | RESPIRATION RATE: 16 BRPM | BODY MASS INDEX: 26.93 KG/M2 | HEART RATE: 68 BPM

## 2021-11-03 DIAGNOSIS — F90.9 ATTENTION DEFICIT HYPERACTIVITY DISORDER (ADHD), UNSPECIFIED ADHD TYPE: Primary | ICD-10-CM

## 2021-11-03 DIAGNOSIS — J30.2 SEASONAL ALLERGIC RHINITIS, UNSPECIFIED TRIGGER: ICD-10-CM

## 2021-11-03 DIAGNOSIS — Z11.8 SPECIAL SCREENING EXAMINATION FOR CHLAMYDIAL DISEASE: ICD-10-CM

## 2021-11-03 DIAGNOSIS — F41.9 ANXIETY: ICD-10-CM

## 2021-11-03 DIAGNOSIS — F43.10 PTSD (POST-TRAUMATIC STRESS DISORDER): ICD-10-CM

## 2021-11-03 PROCEDURE — 99213 OFFICE O/P EST LOW 20 MIN: CPT | Performed by: NURSE PRACTITIONER

## 2021-11-03 PROCEDURE — G0463 HOSPITAL OUTPT CLINIC VISIT: HCPCS | Mod: 25

## 2021-11-03 PROCEDURE — 87491 CHLMYD TRACH DNA AMP PROBE: CPT | Mod: ZL | Performed by: NURSE PRACTITIONER

## 2021-11-03 PROCEDURE — G0463 HOSPITAL OUTPT CLINIC VISIT: HCPCS

## 2021-11-03 RX ORDER — DEXTROAMPHETAMINE SACCHARATE, AMPHETAMINE ASPARTATE MONOHYDRATE, DEXTROAMPHETAMINE SULFATE AND AMPHETAMINE SULFATE 3.75; 3.75; 3.75; 3.75 MG/1; MG/1; MG/1; MG/1
15 CAPSULE, EXTENDED RELEASE ORAL DAILY
Qty: 30 CAPSULE | Refills: 0 | Status: SHIPPED | OUTPATIENT
Start: 2021-11-17 | End: 2022-02-04

## 2021-11-03 RX ORDER — CETIRIZINE HYDROCHLORIDE 10 MG/1
10 TABLET ORAL DAILY
Qty: 30 TABLET | Refills: 1 | Status: SHIPPED | OUTPATIENT
Start: 2021-11-03 | End: 2021-12-01

## 2021-11-03 RX ORDER — DEXTROAMPHETAMINE SACCHARATE, AMPHETAMINE ASPARTATE MONOHYDRATE, DEXTROAMPHETAMINE SULFATE AND AMPHETAMINE SULFATE 3.75; 3.75; 3.75; 3.75 MG/1; MG/1; MG/1; MG/1
15 CAPSULE, EXTENDED RELEASE ORAL DAILY
Qty: 30 CAPSULE | Refills: 0 | Status: SHIPPED | OUTPATIENT
Start: 2022-01-14 | End: 2022-02-04

## 2021-11-03 RX ORDER — DEXTROAMPHETAMINE SACCHARATE, AMPHETAMINE ASPARTATE MONOHYDRATE, DEXTROAMPHETAMINE SULFATE AND AMPHETAMINE SULFATE 3.75; 3.75; 3.75; 3.75 MG/1; MG/1; MG/1; MG/1
15 CAPSULE, EXTENDED RELEASE ORAL DAILY
Qty: 30 CAPSULE | Refills: 0 | Status: SHIPPED | OUTPATIENT
Start: 2021-12-17 | End: 2022-02-04

## 2021-11-03 ASSESSMENT — ENCOUNTER SYMPTOMS
ARTHRALGIAS: 0
RHINORRHEA: 1
VOMITING: 0
APPETITE CHANGE: 0
HEADACHES: 0
FEVER: 0
FATIGUE: 0
AGITATION: 0
DIZZINESS: 0
NAUSEA: 0
WHEEZING: 0
CHILLS: 0
CONSTIPATION: 0
ABDOMINAL PAIN: 0
SHORTNESS OF BREATH: 0
ACTIVITY CHANGE: 0
DIARRHEA: 0
COUGH: 0
CHEST TIGHTNESS: 0
DYSURIA: 0
HYPERACTIVE: 0
PALPITATIONS: 0

## 2021-11-03 ASSESSMENT — VISUAL ACUITY: OU: 1

## 2021-11-03 ASSESSMENT — PAIN SCALES - GENERAL: PAINLEVEL: NO PAIN (0)

## 2021-11-03 ASSESSMENT — MIFFLIN-ST. JEOR: SCORE: 1423.6

## 2021-11-03 NOTE — NURSING NOTE
"Chief Complaint   Patient presents with     A.D.H.D       Initial /64 (BP Location: Left arm, Patient Position: Sitting, Cuff Size: Adult Regular)   Pulse 107   Temp 97.8  F (36.6  C) (Tympanic)   Resp 16   Ht 1.6 m (5' 3\")   Wt 68.9 kg (152 lb)   SpO2 98%   BMI 26.93 kg/m   Estimated body mass index is 26.93 kg/m  as calculated from the following:    Height as of this encounter: 1.6 m (5' 3\").    Weight as of this encounter: 68.9 kg (152 lb).  Medication Reconciliation: complete  Nu Goyal MA  "

## 2021-11-04 LAB
C TRACH DNA SPEC QL PROBE+SIG AMP: NEGATIVE
N GONORRHOEA DNA SPEC QL NAA+PROBE: NEGATIVE

## 2021-11-29 NOTE — PROGRESS NOTES
Radha is a 21 year old who is being evaluated via a billable telephone visit.      What phone number would you like to be contacted at? [phone]  How would you like to obtain your AVS? MyChart    Assessment & Plan     Seasonal allergic rhinitis, unspecified trigger  Patient doing much better. Will continue the Zyrtec. Will f/up with any new or worsening symptoms.     - cetirizine (ZYRTEC) 10 MG tablet; Take 1 tablet (10 mg) by mouth daily      No follow-ups on file.    Lissy Perez NP  [facility] - [place]    Subjective   Radha is a 21 year old who presents for the following health issues    HPI     Follow-Up Allergies  Last seen on 11/3/21. Was sneezing more with nasal congestion. Zyrtec 10 mg was started.     Today she notes that she feels much better. No longer sneezing. Nasal congestion has resolved. No cough or cold like symptoms. No fevers. No chest pain or shortness of breath. Would like to continue the Zyrtec. Denies any side effects.     Review of Systems   As noted in the HPI.       Objective           Vitals:  No vitals were obtained today due to virtual visit.    Physical Exam   healthy, alert and no distress  PSYCH: Alert and oriented times 3; coherent speech, normal   rate and volume, able to articulate logical thoughts, able   to abstract reason, no tangential thoughts, no hallucinations   or delusions  Radha Mayfield's affect is normal  RESP: No cough, no audible wheezing, able to talk in full sentences  Remainder of exam unable to be completed due to telephone visits          Phone call duration: 5 minutes

## 2021-12-01 ENCOUNTER — VIRTUAL VISIT (OUTPATIENT)
Dept: FAMILY MEDICINE | Facility: OTHER | Age: 21
End: 2021-12-01
Attending: NURSE PRACTITIONER
Payer: COMMERCIAL

## 2021-12-01 DIAGNOSIS — J30.2 SEASONAL ALLERGIC RHINITIS, UNSPECIFIED TRIGGER: Primary | ICD-10-CM

## 2021-12-01 PROCEDURE — G0463 HOSPITAL OUTPT CLINIC VISIT: HCPCS | Mod: 25,TEL,95

## 2021-12-01 PROCEDURE — 99213 OFFICE O/P EST LOW 20 MIN: CPT | Mod: 95 | Performed by: NURSE PRACTITIONER

## 2021-12-01 RX ORDER — CETIRIZINE HYDROCHLORIDE 10 MG/1
10 TABLET ORAL DAILY
Qty: 90 TABLET | Refills: 3 | Status: SHIPPED | OUTPATIENT
Start: 2021-12-01 | End: 2022-08-10

## 2021-12-01 NOTE — NURSING NOTE
"Chief Complaint   Patient presents with     Allergies       Initial There were no vitals taken for this visit. Estimated body mass index is 26.93 kg/m  as calculated from the following:    Height as of 11/3/21: 1.6 m (5' 3\").    Weight as of 11/3/21: 68.9 kg (152 lb).  Medication Reconciliation: complete  Oksana Colvin LPN  "

## 2022-02-02 NOTE — PROGRESS NOTES
"  Assessment & Plan     Attention deficit hyperactivity disorder (ADHD), unspecified ADHD type  Pt shows no signs of diversion or abuse. Up to 3 month of his/her ADD/ADHD medications given today. Pt is aware that he/she is solely responsible for protections of the prescriptions. I will not tolerate any lost or stolen prescriptions lightly. Will see pt back in office in 3 months. Stimulant agreement up to date. Urine drug scren UTD.  reviewed and accurate. Stimulant agreement signed in 8/2021.    - amphetamine-dextroamphetamine (ADDERALL XR) 15 MG 24 hr capsule; Take 1 capsule (15 mg) by mouth daily  - amphetamine-dextroamphetamine (ADDERALL XR) 15 MG 24 hr capsule; Take 1 capsule (15 mg) by mouth daily  - amphetamine-dextroamphetamine (ADDERALL XR) 15 MG 24 hr capsule; Take 1 capsule (15 mg) by mouth daily    PTSD (post-traumatic stress disorder)  Anxiety  Stable. No thoughts of suicide. Continue to follow with counselor.          BMI:   Estimated body mass index is 28.34 kg/m  as calculated from the following:    Height as of this encounter: 1.6 m (5' 3\").    Weight as of this encounter: 72.6 kg (160 lb).         Return in about 3 months (around 5/4/2022).    Lissy Perez NP  [facility] - [place] is a 22 year old who presents for the following health issues    HPI     ADHD:   Medication Followup of  Adderall XR 15 mg once daily    Taking Medication as prescribed: yes,     Side Effects:  None; no chest pain, shortness of breath, dizziness, syncope, insomnia, or palpitations; weight stable   Medication Helping Symptoms:  yes, able to stay on task and focus well.    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 School is going well. Grades good-As and Bs. 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 Job going well.      -She does have some anxiety and PTSD. Seeing a counselor at [facility]. Feels this is helping. No thoughts of suicide or homicide. Some anxiety but is " "going to therapy. Seeing Jenniferjill Carranza in [place], [place] for anxiety and states that it is going well.    Drug screen appropriate in 8/2021.     Review of Systems   Constitutional, HEENT, cardiovascular, pulmonary, gi and gu systems are negative, except as otherwise noted.      Objective    /74 (BP Location: Right arm, Patient Position: Chair, Cuff Size: Adult Regular)   Pulse 110   Temp 98.5  F (36.9  C) (Tympanic)   Ht 1.6 m (5' 3\")   Wt 72.6 kg (160 lb)   LMP 01/10/2022   SpO2 96%   BMI 28.34 kg/m    Body mass index is 28.34 kg/m .  Physical Exam   GENERAL: healthy, alert and no distress  EYES: Eyes grossly normal to inspection, PERRL and conjunctivae and sclerae normal  HENT: ear canals and TM's normal, nose and mouth without ulcers or lesions  NECK: no adenopathy, no asymmetry, masses, or scars and thyroid normal to palpation  RESP: lungs clear to auscultation - no rales, rhonchi or wheezes  CV: regular rate and rhythm, no murmur, click or rub, no peripheral edema  NEURO: Normal strength and tone, mentation intact and speech normal  PSYCH: mentation appears normal, affect normal/bright            "

## 2022-02-04 ENCOUNTER — OFFICE VISIT (OUTPATIENT)
Dept: FAMILY MEDICINE | Facility: OTHER | Age: 22
End: 2022-02-04
Attending: NURSE PRACTITIONER
Payer: COMMERCIAL

## 2022-02-04 VITALS
OXYGEN SATURATION: 96 % | HEART RATE: 100 BPM | BODY MASS INDEX: 28.35 KG/M2 | TEMPERATURE: 98.5 F | HEIGHT: 63 IN | WEIGHT: 160 LBS | DIASTOLIC BLOOD PRESSURE: 74 MMHG | SYSTOLIC BLOOD PRESSURE: 112 MMHG

## 2022-02-04 DIAGNOSIS — F90.9 ATTENTION DEFICIT HYPERACTIVITY DISORDER (ADHD), UNSPECIFIED ADHD TYPE: Primary | ICD-10-CM

## 2022-02-04 DIAGNOSIS — F41.9 ANXIETY: ICD-10-CM

## 2022-02-04 DIAGNOSIS — F43.10 PTSD (POST-TRAUMATIC STRESS DISORDER): ICD-10-CM

## 2022-02-04 PROCEDURE — G0463 HOSPITAL OUTPT CLINIC VISIT: HCPCS

## 2022-02-04 PROCEDURE — G0463 HOSPITAL OUTPT CLINIC VISIT: HCPCS | Mod: 25

## 2022-02-04 PROCEDURE — 99213 OFFICE O/P EST LOW 20 MIN: CPT | Performed by: NURSE PRACTITIONER

## 2022-02-04 RX ORDER — DEXTROAMPHETAMINE SACCHARATE, AMPHETAMINE ASPARTATE MONOHYDRATE, DEXTROAMPHETAMINE SULFATE AND AMPHETAMINE SULFATE 3.75; 3.75; 3.75; 3.75 MG/1; MG/1; MG/1; MG/1
15 CAPSULE, EXTENDED RELEASE ORAL DAILY
Qty: 30 CAPSULE | Refills: 0 | Status: SHIPPED | OUTPATIENT
Start: 2022-04-11 | End: 2022-03-21

## 2022-02-04 RX ORDER — DEXTROAMPHETAMINE SACCHARATE, AMPHETAMINE ASPARTATE MONOHYDRATE, DEXTROAMPHETAMINE SULFATE AND AMPHETAMINE SULFATE 3.75; 3.75; 3.75; 3.75 MG/1; MG/1; MG/1; MG/1
15 CAPSULE, EXTENDED RELEASE ORAL DAILY
Qty: 30 CAPSULE | Refills: 0 | Status: SHIPPED | OUTPATIENT
Start: 2022-02-11 | End: 2022-05-04

## 2022-02-04 RX ORDER — DEXTROAMPHETAMINE SACCHARATE, AMPHETAMINE ASPARTATE MONOHYDRATE, DEXTROAMPHETAMINE SULFATE AND AMPHETAMINE SULFATE 3.75; 3.75; 3.75; 3.75 MG/1; MG/1; MG/1; MG/1
15 CAPSULE, EXTENDED RELEASE ORAL DAILY
Qty: 30 CAPSULE | Refills: 0 | Status: SHIPPED | OUTPATIENT
Start: 2022-03-11 | End: 2022-03-21

## 2022-02-04 ASSESSMENT — PATIENT HEALTH QUESTIONNAIRE - PHQ9: SUM OF ALL RESPONSES TO PHQ QUESTIONS 1-9: 17

## 2022-02-04 ASSESSMENT — ANXIETY QUESTIONNAIRES
2. NOT BEING ABLE TO STOP OR CONTROL WORRYING: MORE THAN HALF THE DAYS
7. FEELING AFRAID AS IF SOMETHING AWFUL MIGHT HAPPEN: SEVERAL DAYS
1. FEELING NERVOUS, ANXIOUS, OR ON EDGE: MORE THAN HALF THE DAYS
GAD7 TOTAL SCORE: 13
5. BEING SO RESTLESS THAT IT IS HARD TO SIT STILL: MORE THAN HALF THE DAYS
6. BECOMING EASILY ANNOYED OR IRRITABLE: MORE THAN HALF THE DAYS
IF YOU CHECKED OFF ANY PROBLEMS ON THIS QUESTIONNAIRE, HOW DIFFICULT HAVE THESE PROBLEMS MADE IT FOR YOU TO DO YOUR WORK, TAKE CARE OF THINGS AT HOME, OR GET ALONG WITH OTHER PEOPLE: SOMEWHAT DIFFICULT
3. WORRYING TOO MUCH ABOUT DIFFERENT THINGS: MORE THAN HALF THE DAYS
4. TROUBLE RELAXING: MORE THAN HALF THE DAYS

## 2022-02-04 ASSESSMENT — MIFFLIN-ST. JEOR: SCORE: 1454.89

## 2022-02-04 ASSESSMENT — PAIN SCALES - GENERAL: PAINLEVEL: NO PAIN (0)

## 2022-02-05 ASSESSMENT — ANXIETY QUESTIONNAIRES: GAD7 TOTAL SCORE: 13

## 2022-03-19 ENCOUNTER — HEALTH MAINTENANCE LETTER (OUTPATIENT)
Age: 22
End: 2022-03-19

## 2022-03-21 DIAGNOSIS — F90.9 ATTENTION DEFICIT HYPERACTIVITY DISORDER (ADHD), UNSPECIFIED ADHD TYPE: ICD-10-CM

## 2022-03-21 RX ORDER — DEXTROAMPHETAMINE SACCHARATE, AMPHETAMINE ASPARTATE MONOHYDRATE, DEXTROAMPHETAMINE SULFATE AND AMPHETAMINE SULFATE 3.75; 3.75; 3.75; 3.75 MG/1; MG/1; MG/1; MG/1
15 CAPSULE, EXTENDED RELEASE ORAL DAILY
Qty: 30 CAPSULE | Refills: 0 | Status: SHIPPED | OUTPATIENT
Start: 2022-03-21 | End: 2022-05-04

## 2022-03-21 RX ORDER — DEXTROAMPHETAMINE SACCHARATE, AMPHETAMINE ASPARTATE MONOHYDRATE, DEXTROAMPHETAMINE SULFATE AND AMPHETAMINE SULFATE 3.75; 3.75; 3.75; 3.75 MG/1; MG/1; MG/1; MG/1
15 CAPSULE, EXTENDED RELEASE ORAL DAILY
Qty: 30 CAPSULE | Refills: 0 | Status: SHIPPED | OUTPATIENT
Start: 2022-04-11 | End: 2022-04-20

## 2022-03-21 NOTE — TELEPHONE ENCOUNTER
Patient calling and requesting Rx to be sent to [facility] due to [facility] being closed. Please advise, thank you.

## 2022-04-20 ENCOUNTER — MYC REFILL (OUTPATIENT)
Dept: FAMILY MEDICINE | Facility: OTHER | Age: 22
End: 2022-04-20
Payer: COMMERCIAL

## 2022-04-20 DIAGNOSIS — F90.9 ATTENTION DEFICIT HYPERACTIVITY DISORDER (ADHD), UNSPECIFIED ADHD TYPE: ICD-10-CM

## 2022-04-20 RX ORDER — DEXTROAMPHETAMINE SACCHARATE, AMPHETAMINE ASPARTATE MONOHYDRATE, DEXTROAMPHETAMINE SULFATE AND AMPHETAMINE SULFATE 3.75; 3.75; 3.75; 3.75 MG/1; MG/1; MG/1; MG/1
15 CAPSULE, EXTENDED RELEASE ORAL DAILY
Qty: 30 CAPSULE | Refills: 0 | Status: SHIPPED | OUTPATIENT
Start: 2022-04-20 | End: 2022-05-04

## 2022-04-20 NOTE — TELEPHONE ENCOUNTER
Adderall  XR 15 mg   Last Written Prescription Date:  4-  Last Fill Quantity: 30,   # refills: 0  Last Office Visit:  Future Office visit:    Next 5 appointments (look out 90 days)    May 04, 2022  3:30 PM  (Arrive by 3:15 PM)  SHORT with Lissy Perez NP  [facility] [place] ([facility] [place] ) [address]  [place] [address]  [phone]

## 2022-05-03 NOTE — PROGRESS NOTES
Assessment & Plan     Attention deficit hyperactivity disorder (ADHD), unspecified ADHD type  Pt shows no signs of diversion or abuse. Up to 3 month of his/her ADD/ADHD medications given today. Pt is aware that he/she is solely responsible for protections of the prescriptions. I will not tolerate any lost or stolen prescriptions lightly. Will see pt back in office in 3 months. Stimulant agreement up to date.  reviewed and accurate. Drug screen UTD.     - amphetamine-dextroamphetamine (ADDERALL XR) 15 MG 24 hr capsule; Take 1 capsule (15 mg) by mouth daily  - amphetamine-dextroamphetamine (ADDERALL XR) 15 MG 24 hr capsule; Take 1 capsule (15 mg) by mouth daily  - amphetamine-dextroamphetamine (ADDERALL XR) 15 MG 24 hr capsule; Take 1 capsule (15 mg) by mouth daily    PTSD (post-traumatic stress disorder)  Anxiety  Controlled with counseling. She plans to continue.     Dizziness  Very rarely occurs. Typically only one day per month when she has a heavy menses, then resolves the following day. I am therefore not very concerned. HR regular. Will check CBC, BMP, and TSH. Will notify patient of the results when available and intervene accordingly. If she is anemic, will start ferrous sulfate and consider starting contraception. If not, she was encouraged to push fluids during her menses and return with new or worsening symptoms.     Return in about 3 months (around 8/4/2022).    Lissy Perez NP  [facility] - [place] is a 22 year old who presents for the following health issues    HPI     ADHD:   Medication Followup of  Adderall XR 15 mg once daily    Taking Medication as prescribed: yes,     Side Effects:  None; no chest pain, shortness of breath, syncope, insomnia, or palpitations; weight up slightly  Medication Helping Symptoms:  yes, able to stay on task and focus well.    -She is currently going to school for graphic design. School is going well. Grades good-As and Bs. Has  been working at the [employer]. Job going well.      -She does have some anxiety and PTSD. Seeing a counselor at [facility]. Feels this is helping. No thoughts of suicide or homicide. Some anxiety but is going to therapy. Seeing Jennifer Carranza in [place], [place] for anxiety and states that it is going well. She feels her anxiety is manageable. Has a good support system.    She does occasionally feel dizzy during her menses. Lasts for about a day then goes away. Menses lasts for about 7 days and they are heavy for 1-2 days. Dizziness only occurs when she has heavy bleeding, then resolves. Also feels slightly nauseated when she has cramps. No syncope or palpitations.     No nausea or vomiting. No abdominal pain.     Review of Systems   Constitutional, HEENT, cardiovascular, pulmonary, gi and gu systems are negative, except as otherwise noted.      Objective    /60   Pulse 100   Temp 97.5  F (36.4  C) (Tympanic)   Resp 18   Wt 74.8 kg (165 lb)   SpO2 97%   BMI 29.23 kg/m    Body mass index is 29.23 kg/m .  Physical Exam   GENERAL: healthy, alert and no distress  EYES: Eyes grossly normal to inspection, PERRL and conjunctivae and sclerae normal  HENT: ear canals and TM's normal, nose and mouth without ulcers or lesions  NECK: no adenopathy, no asymmetry, masses, or scars and thyroid normal to palpation  RESP: lungs clear to auscultation - no rales, rhonchi or wheezes  CV: regular rate and rhythm,  no murmur, click or rub, no peripheral edema  ABDOMEN: soft, nontender, no masses and bowel sounds normal  MS: no gross musculoskeletal defects noted, no edema  NEURO: Normal strength and tone, mentation intact and speech normal  PSYCH: mentation appears normal, affect normal/bright

## 2022-05-04 ENCOUNTER — OFFICE VISIT (OUTPATIENT)
Dept: FAMILY MEDICINE | Facility: OTHER | Age: 22
End: 2022-05-04
Attending: NURSE PRACTITIONER
Payer: COMMERCIAL

## 2022-05-04 VITALS
WEIGHT: 165 LBS | OXYGEN SATURATION: 97 % | HEART RATE: 100 BPM | BODY MASS INDEX: 29.23 KG/M2 | RESPIRATION RATE: 18 BRPM | DIASTOLIC BLOOD PRESSURE: 60 MMHG | SYSTOLIC BLOOD PRESSURE: 100 MMHG | TEMPERATURE: 97.5 F

## 2022-05-04 DIAGNOSIS — F90.9 ATTENTION DEFICIT HYPERACTIVITY DISORDER (ADHD), UNSPECIFIED ADHD TYPE: Primary | ICD-10-CM

## 2022-05-04 DIAGNOSIS — F43.10 PTSD (POST-TRAUMATIC STRESS DISORDER): ICD-10-CM

## 2022-05-04 DIAGNOSIS — R42 DIZZINESS: ICD-10-CM

## 2022-05-04 DIAGNOSIS — F41.9 ANXIETY: ICD-10-CM

## 2022-05-04 LAB
ANION GAP SERPL CALCULATED.3IONS-SCNC: 8 MMOL/L (ref 3–14)
BASOPHILS # BLD AUTO: 0.1 10E3/UL (ref 0–0.2)
BASOPHILS NFR BLD AUTO: 0 %
BUN SERPL-MCNC: 12 MG/DL (ref 7–30)
CALCIUM SERPL-MCNC: 8.7 MG/DL (ref 8.5–10.1)
CHLORIDE BLD-SCNC: 106 MMOL/L (ref 94–109)
CO2 SERPL-SCNC: 25 MMOL/L (ref 20–32)
CREAT SERPL-MCNC: 0.62 MG/DL (ref 0.52–1.25)
EOSINOPHIL # BLD AUTO: 0.3 10E3/UL (ref 0–0.7)
EOSINOPHIL NFR BLD AUTO: 3 %
ERYTHROCYTE [DISTWIDTH] IN BLOOD BY AUTOMATED COUNT: 13.1 % (ref 10–15)
GFR SERPL CREATININE-BSD FRML MDRD: >90 ML/MIN/1.73M2
GLUCOSE BLD-MCNC: 108 MG/DL (ref 70–99)
HCT VFR BLD AUTO: 41.4 % (ref 35–53)
HGB BLD-MCNC: 13.8 G/DL (ref 11.7–17.7)
IMM GRANULOCYTES # BLD: 0 10E3/UL
IMM GRANULOCYTES NFR BLD: 0 %
LYMPHOCYTES # BLD AUTO: 3.1 10E3/UL (ref 0.8–5.3)
LYMPHOCYTES NFR BLD AUTO: 26 %
MCH RBC QN AUTO: 28.8 PG (ref 26.5–33)
MCHC RBC AUTO-ENTMCNC: 33.3 G/DL (ref 31.5–36.5)
MCV RBC AUTO: 86 FL (ref 78–100)
MONOCYTES # BLD AUTO: 0.8 10E3/UL (ref 0–1.3)
MONOCYTES NFR BLD AUTO: 7 %
NEUTROPHILS # BLD AUTO: 7.4 10E3/UL (ref 1.6–8.3)
NEUTROPHILS NFR BLD AUTO: 64 %
NRBC # BLD AUTO: 0 10E3/UL
NRBC BLD AUTO-RTO: 0 /100
PLATELET # BLD AUTO: 452 10E3/UL (ref 150–450)
POTASSIUM BLD-SCNC: 3.8 MMOL/L (ref 3.4–5.3)
RBC # BLD AUTO: 4.79 10E6/UL (ref 3.8–5.9)
SODIUM SERPL-SCNC: 139 MMOL/L (ref 133–144)
TSH SERPL DL<=0.005 MIU/L-ACNC: 0.88 MU/L (ref 0.4–4)
WBC # BLD AUTO: 11.7 10E3/UL (ref 4–11)

## 2022-05-04 PROCEDURE — 84443 ASSAY THYROID STIM HORMONE: CPT | Mod: ZL | Performed by: NURSE PRACTITIONER

## 2022-05-04 PROCEDURE — G0463 HOSPITAL OUTPT CLINIC VISIT: HCPCS | Performed by: NURSE PRACTITIONER

## 2022-05-04 PROCEDURE — 99214 OFFICE O/P EST MOD 30 MIN: CPT | Performed by: NURSE PRACTITIONER

## 2022-05-04 PROCEDURE — 82310 ASSAY OF CALCIUM: CPT | Mod: ZL | Performed by: NURSE PRACTITIONER

## 2022-05-04 PROCEDURE — 85025 COMPLETE CBC W/AUTO DIFF WBC: CPT | Mod: ZL | Performed by: NURSE PRACTITIONER

## 2022-05-04 PROCEDURE — 36415 COLL VENOUS BLD VENIPUNCTURE: CPT | Mod: ZL | Performed by: NURSE PRACTITIONER

## 2022-05-04 RX ORDER — DEXTROAMPHETAMINE SACCHARATE, AMPHETAMINE ASPARTATE MONOHYDRATE, DEXTROAMPHETAMINE SULFATE AND AMPHETAMINE SULFATE 3.75; 3.75; 3.75; 3.75 MG/1; MG/1; MG/1; MG/1
15 CAPSULE, EXTENDED RELEASE ORAL DAILY
Qty: 30 CAPSULE | Refills: 0 | Status: SHIPPED | OUTPATIENT
Start: 2022-07-20 | End: 2022-08-10

## 2022-05-04 RX ORDER — DEXTROAMPHETAMINE SACCHARATE, AMPHETAMINE ASPARTATE MONOHYDRATE, DEXTROAMPHETAMINE SULFATE AND AMPHETAMINE SULFATE 3.75; 3.75; 3.75; 3.75 MG/1; MG/1; MG/1; MG/1
15 CAPSULE, EXTENDED RELEASE ORAL DAILY
Qty: 30 CAPSULE | Refills: 0 | Status: SHIPPED | OUTPATIENT
Start: 2022-06-20 | End: 2022-08-10

## 2022-05-04 RX ORDER — DEXTROAMPHETAMINE SACCHARATE, AMPHETAMINE ASPARTATE MONOHYDRATE, DEXTROAMPHETAMINE SULFATE AND AMPHETAMINE SULFATE 3.75; 3.75; 3.75; 3.75 MG/1; MG/1; MG/1; MG/1
15 CAPSULE, EXTENDED RELEASE ORAL DAILY
Qty: 30 CAPSULE | Refills: 0 | Status: SHIPPED | OUTPATIENT
Start: 2022-05-20 | End: 2022-08-10

## 2022-05-04 ASSESSMENT — PAIN SCALES - GENERAL: PAINLEVEL: NO PAIN (0)

## 2022-05-09 NOTE — NURSING NOTE
"Chief Complaint   Patient presents with     A.D.H.D       Initial /60   Pulse 100   Temp 97.5  F (36.4  C) (Tympanic)   Resp 18   Wt 74.8 kg (165 lb)   SpO2 97%   BMI 29.23 kg/m   Estimated body mass index is 29.23 kg/m  as calculated from the following:    Height as of 2/4/22: 1.6 m (5' 3\").    Weight as of this encounter: 74.8 kg (165 lb).  Medication Reconciliation: complete  Marlene Salgado LPN    "

## 2022-08-04 ENCOUNTER — HOSPITAL ENCOUNTER (EMERGENCY)
Facility: HOSPITAL | Age: 22
Discharge: HOME | End: 2022-08-04
Attending: PHYSICIAN ASSISTANT | Admitting: PHYSICIAN ASSISTANT
Payer: COMMERCIAL

## 2022-08-04 VITALS
WEIGHT: 168 LBS | HEART RATE: 114 BPM | OXYGEN SATURATION: 96 % | SYSTOLIC BLOOD PRESSURE: 116 MMHG | BODY MASS INDEX: 29.76 KG/M2 | TEMPERATURE: 98.8 F | RESPIRATION RATE: 17 BRPM | DIASTOLIC BLOOD PRESSURE: 83 MMHG

## 2022-08-04 DIAGNOSIS — L02.92 BOIL: ICD-10-CM

## 2022-08-04 PROCEDURE — 99213 OFFICE O/P EST LOW 20 MIN: CPT | Performed by: PHYSICIAN ASSISTANT

## 2022-08-04 PROCEDURE — G0463 HOSPITAL OUTPT CLINIC VISIT: HCPCS

## 2022-08-04 RX ORDER — CEFADROXIL 500 MG/1
500 CAPSULE ORAL 2 TIMES DAILY
Qty: 14 CAPSULE | Refills: 0 | Status: SHIPPED | OUTPATIENT
Start: 2022-08-04 | End: 2022-08-10

## 2022-08-04 ASSESSMENT — ENCOUNTER SYMPTOMS: COLOR CHANGE: 1

## 2022-08-04 NOTE — ED PROVIDER NOTES
History     Chief Complaint   Patient presents with     Skin Ulcer     HPI  Radha Mayfield is a 22 year old adult who presents to urgent care for evaluation of boil on back of neck.  Patient states that she noticed this about 2 days ago.  She states that it has become more painful.  She denies any fevers, chills, or any other associated symptoms.    Allergies:  Allergies   Allergen Reactions     No Clinical Screening - See Comments      Oxcarbazepine      rash     Chlorine Rash       Problem List:    Patient Active Problem List    Diagnosis Date Noted     H/O absence seizures 10/16/2019     Priority: Medium     Attention deficit hyperactivity disorder (ADHD) 03/23/2007     Priority: Medium     IMO Update 10 2016          Past Medical History:    No past medical history on file.    Past Surgical History:    Past Surgical History:   Procedure Laterality Date     APPENDECTOMY       TONSILLECTOMY & ADENOIDECTOMY         Family History:    Family History   Problem Relation Age of Onset     Scoliosis Mother      Cardiovascular Mother      No Known Problems Father        Social History:  Marital Status:  Single [1]  Social History     Tobacco Use     Smoking status: Never Smoker     Smokeless tobacco: Never Used   Substance Use Topics     Alcohol use: Not Currently     Drug use: Not Currently        Medications:    amphetamine-dextroamphetamine (ADDERALL XR) 15 MG 24 hr capsule  cefadroxil (DURICEF) 500 MG capsule  cetirizine (ZYRTEC) 10 MG tablet  amphetamine-dextroamphetamine (ADDERALL XR) 15 MG 24 hr capsule  amphetamine-dextroamphetamine (ADDERALL XR) 15 MG 24 hr capsule          Review of Systems   Skin: Positive for color change.   All other systems reviewed and are negative.      Physical Exam   BP: 116/83  Pulse: 114  Temp: 98.8  F (37.1  C)  Resp: 17  Weight: 76.2 kg (168 lb)  SpO2: 96 %      Physical Exam  Vitals and nursing note reviewed.   Constitutional:       General: Radha Mayfield is not in acute  distress.     Appearance: Normal appearance. Radha Mayfield is not ill-appearing or toxic-appearing.   HENT:      Head:        Comments: Patient has small boil present over upper right aspect of back.  There is approximately a half dollar sized area of surrounding erythema that is warm to the touch no fluctuance or induration is appreciated  Cardiovascular:      Rate and Rhythm: Regular rhythm.      Heart sounds: Normal heart sounds.   Pulmonary:      Breath sounds: Normal breath sounds.   Neurological:      Mental Status: Radha Mayfield is alert and oriented to person, place, and time.         ED Course                 Procedures             Critical Care time:               No results found for this or any previous visit (from the past 24 hour(s)).    Medications - No data to display    Assessments & Plan (with Medical Decision Making)   #1.  Boil of neck    Discussed exam findings with patient.  Patient is prescribed course of cefadroxil.  She is encouraged to apply warm compress to area.  Tylenol or ibuprofen as directed for pain.  Any additional concerns please return to urgent care or follow-up with primary care provider.  Patient verbalized understanding and agreement of plan.      I have reviewed the nursing notes.    I have reviewed the findings, diagnosis, plan and need for follow up with the patient.    Discharge Medication List as of 8/4/2022  2:16 PM          Final diagnoses:   Boil       8/4/2022   [facility]     Salvador Bustamante PA-C  08/04/22 [id number]

## 2022-08-04 NOTE — ED TRIAGE NOTES
Patient presents today with a skin boil/cyst that is on the back of her neck that is red/a bit swollen. She just noticed it yesterday. No drainage from it.      Triage Assessment     Row Name 08/04/22 [id number]       Triage Assessment (Adult)    Airway WDL WDL       Respiratory WDL    Respiratory WDL WDL       Skin Circulation/Temperature WDL    Skin Circulation/Temperature WDL WDL       Cardiac WDL    Cardiac WDL WDL       Peripheral/Neurovascular WDL    Peripheral Neurovascular WDL WDL       Cognitive/Neuro/Behavioral WDL    Cognitive/Neuro/Behavioral WDL WDL

## 2022-08-04 NOTE — ED TRIAGE NOTES
Patient presents to urgent care for boil on the back of neck that she noticed x2 days ago. Patient had a band aid on it when she got in the room.     Triage Assessment     Row Name 08/04/22 [id number]       Triage Assessment (Adult)    Airway WDL WDL       Respiratory WDL    Respiratory WDL WDL       Skin Circulation/Temperature WDL    Skin Circulation/Temperature WDL WDL       Cardiac WDL    Cardiac WDL WDL       Peripheral/Neurovascular WDL    Peripheral Neurovascular WDL WDL       Cognitive/Neuro/Behavioral WDL    Cognitive/Neuro/Behavioral WDL WDL

## 2022-08-09 NOTE — PROGRESS NOTES
"  Assessment & Plan     Attention deficit hyperactivity disorder (ADHD), unspecified ADHD type  Pt shows no signs of diversion or abuse. Up to 3 month of his/her ADD/ADHD medications given today. Pt is aware that he/she is solely responsible for protections of the prescriptions. I will not tolerate any lost or stolen prescriptions lightly. Will see pt back in office in 3 months. Stimulant agreement up dated today. Urine drug scren updated today.  reviewed and accurate.    - amphetamine-dextroamphetamine (ADDERALL XR) 15 MG 24 hr capsule; Take 1 capsule (15 mg) by mouth daily  - amphetamine-dextroamphetamine (ADDERALL XR) 15 MG 24 hr capsule; Take 1 capsule (15 mg) by mouth daily  - amphetamine-dextroamphetamine (ADDERALL XR) 15 MG 24 hr capsule; Take 1 capsule (15 mg) by mouth daily    PTSD (post-traumatic stress disorder)  Anxiety  Stable. No thoughts of suicide. Continue to follow with counselor.    Seasonal Allergies  Controlled with Zyrtec. Will continue.     Infected Sebaceous Cyst  Patient notes that the cyst is feeling better. Measures about the size of a half dollar in the ER. Now about the size of a dime. She completes her 7 days of Keflex tomorrow. Will extend for an additional 3 days. She will return if her symptoms do not resolve.      BMI:   Estimated body mass index is 29.76 kg/m  as calculated from the following:    Height as of 2/4/22: 1.6 m (5' 3\").    Weight as of this encounter: 76.2 kg (168 lb).         No follow-ups on file.    Lissy Perez NP  [facility] - MOISES Garcia is a 22 year old who presents for the following health issues    HPI     ADHD:   Medication Followup of  Adderall XR 15 mg once daily    Taking Medication as prescribed: yes,     Side Effects:  None; no chest pain, shortness of breath, dizziness, syncope, insomnia, or palpitations; weight stable   Medication Helping Symptoms:  yes, able to stay on task and focus well.   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 Starts again next week. School is going well. Grades good-As and Bs. Had been working at the [employer] and plans to restart next week.     -She does have some anxiety and PTSD. Seeing a counselor at [facility]. Feels this is helping. No thoughts of suicide or homicide. Some anxiety but is going to therapy. Seeing Jennifer Tere in [place], [place] for anxiety and states that it is going well    Drug screen appropriate in 8/2021.     Allergies controlled with Zyrtec. Will continue.    She was in the ER on 8/4/22 with an infected boil. Note was reviewed. Started on Keflex. Today she notes that it is feeling better. No fevers. No drainage. Taking the antibiotic as prescribed.      Review of Systems   Constitutional, HEENT, cardiovascular, pulmonary, gi and gu systems are negative, except as otherwise noted.      Objective    /72 (BP Location: Right arm, Patient Position: Chair, Cuff Size: Adult Regular)   Pulse 118   Wt 76.2 kg (168 lb)   LMP 07/18/2022   SpO2 96%   BMI 29.76 kg/m    Body mass index is 29.76 kg/m .  Physical Exam   GENERAL: healthy, alert and no distress   EYES: Eyes grossly normal to inspection, PERRL and conjunctivae and sclerae normal  HENT: ear canals and TM's normal, nose and mouth without ulcers or lesions  NECK: no adenopathy, no asymmetry, masses, or scars and thyroid normal to palpation  RESP: lungs clear to auscultation - no rales, rhonchi or wheezes  CV: regular rate and rhythm, no murmur, click or rub, no peripheral edema  NEURO: Normal strength and tone, mentation intact and speech normal  PSYCH: mentation appears normal, affect normal/bright  SKIN: Patient has dime sized erythematous tender mass right upper back. No drainage.

## 2022-08-10 ENCOUNTER — OFFICE VISIT (OUTPATIENT)
Dept: FAMILY MEDICINE | Facility: OTHER | Age: 22
End: 2022-08-10
Attending: NURSE PRACTITIONER
Payer: COMMERCIAL

## 2022-08-10 VITALS
WEIGHT: 168 LBS | HEART RATE: 94 BPM | SYSTOLIC BLOOD PRESSURE: 118 MMHG | OXYGEN SATURATION: 96 % | DIASTOLIC BLOOD PRESSURE: 72 MMHG | BODY MASS INDEX: 29.76 KG/M2

## 2022-08-10 DIAGNOSIS — L72.3 INFECTED SEBACEOUS CYST: ICD-10-CM

## 2022-08-10 DIAGNOSIS — F43.10 PTSD (POST-TRAUMATIC STRESS DISORDER): ICD-10-CM

## 2022-08-10 DIAGNOSIS — F41.9 ANXIETY: ICD-10-CM

## 2022-08-10 DIAGNOSIS — J30.2 SEASONAL ALLERGIC RHINITIS, UNSPECIFIED TRIGGER: ICD-10-CM

## 2022-08-10 DIAGNOSIS — F90.9 ATTENTION DEFICIT HYPERACTIVITY DISORDER (ADHD), UNSPECIFIED ADHD TYPE: Primary | ICD-10-CM

## 2022-08-10 DIAGNOSIS — L08.9 INFECTED SEBACEOUS CYST: ICD-10-CM

## 2022-08-10 LAB — CREAT UR-MCNC: 169 MG/DL

## 2022-08-10 PROCEDURE — G0463 HOSPITAL OUTPT CLINIC VISIT: HCPCS | Mod: 25

## 2022-08-10 PROCEDURE — 99214 OFFICE O/P EST MOD 30 MIN: CPT | Performed by: NURSE PRACTITIONER

## 2022-08-10 PROCEDURE — G0463 HOSPITAL OUTPT CLINIC VISIT: HCPCS

## 2022-08-10 PROCEDURE — 80307 DRUG TEST PRSMV CHEM ANLYZR: CPT | Mod: ZL | Performed by: NURSE PRACTITIONER

## 2022-08-10 RX ORDER — DEXTROAMPHETAMINE SACCHARATE, AMPHETAMINE ASPARTATE MONOHYDRATE, DEXTROAMPHETAMINE SULFATE AND AMPHETAMINE SULFATE 3.75; 3.75; 3.75; 3.75 MG/1; MG/1; MG/1; MG/1
15 CAPSULE, EXTENDED RELEASE ORAL DAILY
Qty: 30 CAPSULE | Refills: 0 | Status: SHIPPED | OUTPATIENT
Start: 2022-09-20 | End: 2022-11-14

## 2022-08-10 RX ORDER — DEXTROAMPHETAMINE SACCHARATE, AMPHETAMINE ASPARTATE MONOHYDRATE, DEXTROAMPHETAMINE SULFATE AND AMPHETAMINE SULFATE 3.75; 3.75; 3.75; 3.75 MG/1; MG/1; MG/1; MG/1
15 CAPSULE, EXTENDED RELEASE ORAL DAILY
Qty: 30 CAPSULE | Refills: 0 | Status: SHIPPED | OUTPATIENT
Start: 2022-10-20 | End: 2023-02-14

## 2022-08-10 RX ORDER — CETIRIZINE HYDROCHLORIDE 10 MG/1
10 TABLET ORAL DAILY
Qty: 90 TABLET | Refills: 3 | Status: SHIPPED | OUTPATIENT
Start: 2022-08-10 | End: 2023-08-17

## 2022-08-10 RX ORDER — CEFADROXIL 500 MG/1
500 CAPSULE ORAL 2 TIMES DAILY
Qty: 6 CAPSULE | Refills: 0 | Status: SHIPPED | OUTPATIENT
Start: 2022-08-10 | End: 2022-08-13

## 2022-08-10 RX ORDER — DEXTROAMPHETAMINE SACCHARATE, AMPHETAMINE ASPARTATE MONOHYDRATE, DEXTROAMPHETAMINE SULFATE AND AMPHETAMINE SULFATE 3.75; 3.75; 3.75; 3.75 MG/1; MG/1; MG/1; MG/1
15 CAPSULE, EXTENDED RELEASE ORAL DAILY
Qty: 30 CAPSULE | Refills: 0 | Status: SHIPPED | OUTPATIENT
Start: 2022-08-20 | End: 2022-11-14

## 2022-08-10 ASSESSMENT — PATIENT HEALTH QUESTIONNAIRE - PHQ9: SUM OF ALL RESPONSES TO PHQ QUESTIONS 1-9: 12

## 2022-08-10 ASSESSMENT — PAIN SCALES - GENERAL: PAINLEVEL: NO PAIN (0)

## 2022-08-10 NOTE — LETTER
[facility]  08/10/22  Patient: Radha Mayfield  YOB: 2000  Medical Record Number: [id number]                                                                                  Non-Opioid Controlled Substance Agreement    This is an agreement between you and your provider regarding safe and appropriate use of controlled substances prescribed by your care team. Controlled substances are?medicines that can cause physical and mental dependence (abuse).     There are strict laws about having and using these medicines. We here at [facility] are  committed to working with you in your efforts to get better. To support you in this work, we'll help you schedule regular office appointments for medicine refills. If we must cancel or change your appointment for any reason, we'll make sure you have enough medicine to last until your next appointment.     As a Provider, I will:     Listen carefully to your concerns while treating you with respect.     Recommend a treatment plan that I believe is in your best interest and may involve therapies other than medicine.      Talk with you often about the possible benefits and the risk of harm of any medicine that we prescribe for you.    Assess the safety of this medicine and check how well it works.      Provide a plan on how to taper (discontinue or go off) using this medicine if the decision is made to stop its use.      ::  As a Patient, I understand controlled substances:       Are prescribed by my care provider to help me function or work and manage my condition(s).?    Are strong medicines and can cause serious side effects.       Need to be taken exactly as prescribed.?Combining controlled substances with certain medicines or chemicals (such as illegal drugs, alcohol, sedatives, sleeping pills, and benzodiazepines) can be dangerous or even fatal.? If I stop taking my medicines suddenly, I may have severe withdrawal symptoms.     The risks, benefits,  and side effects of these medicine(s) were explained to me. I agree that:    1. I will take part in other treatments as advised by my care team. This may be psychiatry or counseling, physical therapy, behavioral therapy, group treatment or a referral to specialist.    2. I will keep all my appointments and understand this is part of the monitoring of controlled substances.?My care team may require an office visit for EVERY controlled substance refill. If I miss appointments or don t follow instructions, my care team may stop my medicine    3. I will take my medicines as prescribed. I will not change the dose or schedule unless my care team tells me to. There will be no refills if I run out early.      4. I may be asked to come to the clinic and complete a urine drug test or complete a pill count. If I don t give a urine sample or participate in a pill count, the care team may stop my medicine.    5. I will only receive controlled substance prescriptions from this clinic. If I am treated by another provider, I will tell them that I am taking controlled substances and that I have a treatment agreement with this provider. I will inform my [facility] care team within one business day if I am given a prescription for any controlled substance by another healthcare provider. My [facility] care team can contact other providers and pharmacists about my use of any medicines.    6. It is up to me to make sure that I don't run out of my medicines on weekends or holidays.?If my care team is willing to refill my prescription without a visit, I must request refills only during office hours. Refills may take up to 3 business days to process. I will use one pharmacy to fill all my controlled substance prescriptions. I will notify the clinic about any changes to my insurance or medicine availability.    7. I am responsible for my prescriptions. If the medicine/prescription is lost, stolen or destroyed, it will not be  replaced.?I also agree not to share controlled substance medicines with anyone.     8. I am aware I should not use any illegal or recreational drugs. I agree not to drink alcohol unless my care team says I can.     9. If I enroll in the [place] Medical Cannabis program, I will tell my care team before my next refill.    10. I will tell my care team right away if I become pregnant, have a new medical problem treated outside of my regular clinic, or have a change in my medicines.     11. I understand that this medicine can affect my thinking, judgment and reaction time.? Alcohol and drugs affect the brain and body, which can affect the safety of my driving. Being under the influence of alcohol or drugs can affect my decision-making, behaviors, personal safety and the safety of others. Driving while impaired (DWI) can occur if a person is driving, operating or in physical control of a car, motorcycle, boat, snowmobile, ATV, motorbike, off-road vehicle or any other motor vehicle ([place] Statute 169A.20). I understand the risk if I choose to drive or operate any vehicle or machinery.    I understand that if I do not follow any of the conditions above, my prescriptions or treatment may be stopped or changed.   I agree that my provider, clinic care team and pharmacy may work with any city, state or federal law enforcement agency that investigates the misuse, sale or other diversion of my controlled medicine. I will allow my provider to discuss my care with, or share a copy of, this agreement with any other treating provider, pharmacy or emergency room where I receive care.     I have read this agreement and have asked questions about anything I did not understand.    ________________________________________________________  Patient Signature - Radha Mayfield     ___________________                   Date     ________________________________________________________  Provider Signature - Lissy Perez NP        ___________________                   Date     ________________________________________________________  Witness Signature (required if provider not present while patient signing)          ___________________                   Date

## 2022-08-16 LAB
AMPHET UR CFM-MCNC: ABNORMAL NG/ML
AMPHET/CREAT UR: ABNORMAL

## 2022-09-04 ENCOUNTER — HEALTH MAINTENANCE LETTER (OUTPATIENT)
Age: 22
End: 2022-09-04

## 2022-09-09 ENCOUNTER — HOSPITAL ENCOUNTER (EMERGENCY)
Facility: HOSPITAL | Age: 22
Discharge: HOME | End: 2022-09-09
Attending: NURSE PRACTITIONER | Admitting: NURSE PRACTITIONER
Payer: COMMERCIAL

## 2022-09-09 VITALS
HEART RATE: 107 BPM | OXYGEN SATURATION: 95 % | RESPIRATION RATE: 16 BRPM | DIASTOLIC BLOOD PRESSURE: 81 MMHG | SYSTOLIC BLOOD PRESSURE: 124 MMHG | TEMPERATURE: 98.7 F

## 2022-09-09 DIAGNOSIS — K08.89 PAIN, DENTAL: Primary | ICD-10-CM

## 2022-09-09 PROCEDURE — 99213 OFFICE O/P EST LOW 20 MIN: CPT | Performed by: NURSE PRACTITIONER

## 2022-09-09 PROCEDURE — G0463 HOSPITAL OUTPT CLINIC VISIT: HCPCS

## 2022-09-09 RX ORDER — CHLORHEXIDINE GLUCONATE ORAL RINSE 1.2 MG/ML
15 SOLUTION DENTAL 2 TIMES DAILY
Qty: 210 ML | Refills: 0 | Status: SHIPPED | OUTPATIENT
Start: 2022-09-09 | End: 2022-09-16

## 2022-09-09 RX ORDER — IBUPROFEN 800 MG/1
800 TABLET, FILM COATED ORAL EVERY 8 HOURS PRN
Qty: 60 TABLET | Refills: 0 | Status: SHIPPED | OUTPATIENT
Start: 2022-09-09 | End: 2024-09-04

## 2022-09-09 ASSESSMENT — ENCOUNTER SYMPTOMS
CHILLS: 0
TROUBLE SWALLOWING: 0
PSYCHIATRIC NEGATIVE: 1
SHORTNESS OF BREATH: 0
FACIAL SWELLING: 0
FEVER: 0
DIARRHEA: 0
VOMITING: 0
NAUSEA: 0

## 2022-09-09 NOTE — ED TRIAGE NOTES
Patient presents to urgent care with c/o dental pain. States its on the left side. States its her wisdom tooth. Has a hard time eating. States she is having a hard time getting in to the dentist due to her insurance. Pain 7/10-8/10 worse if she's eating. Took tylenol and used Orgel this morning states it helped with the pain a little bid.

## 2022-09-09 NOTE — DISCHARGE INSTRUCTIONS
Augmentin as ordered  - Take entire course of antibiotic even if you start to feel better.  - Antibiotics can cause stomach upset including nausea and diarrhea. Read your bottle or ask the pharmacist if antibiotic can be taken with food to help prevent nausea. If you have symptoms of diarrhea you can take an over-the-counter probiotic and/or increase foods with probiotics such as yogurt, [place], sauerkraut.    Peridex mouth wash as ordered    Alternate Tylenol and ibuprofen as needed for pain    Dental list provided, call dentist and see who is taking new patients she can get in to be seen for further evaluation.    Follow-up with primary care provider or return to urgent care-ED with any worsening in condition or additional concerns peer

## 2022-09-09 NOTE — ED PROVIDER NOTES
History     Chief Complaint   Patient presents with     Dental Pain     HPI  Radha Mayfield is a 22 year old adult who presents to urgent care today ambulatory with complaints of left lower wisdom tooth pain which started this morning.  No facial swelling.  No difficulty swallowing.  No trismus.  Pain currently 7/10, has been taking Tylenol and using Orajel which has been helpful with the pain.  Does not have established dental.  Denies any fever, chills, nausea, vomiting, diarrhea, shortness of breath or chest pain.  Not currently pregnant.  No other concerns.    Allergies:  Allergies   Allergen Reactions     No Clinical Screening - See Comments      Oxcarbazepine      rash     Chlorine Rash       Problem List:    Patient Active Problem List    Diagnosis Date Noted     H/O absence seizures 10/16/2019     Priority: Medium     Attention deficit hyperactivity disorder (ADHD) 03/23/2007     Priority: Medium     IMO Update 10 2016          Past Medical History:    No past medical history on file.    Past Surgical History:    Past Surgical History:   Procedure Laterality Date     APPENDECTOMY       TONSILLECTOMY & ADENOIDECTOMY         Family History:    Family History   Problem Relation Age of Onset     Scoliosis Mother      Cardiovascular Mother      No Known Problems Father        Social History:  Marital Status:  Single [1]  Social History     Tobacco Use     Smoking status: Never Smoker     Smokeless tobacco: Never Used   Substance Use Topics     Alcohol use: Not Currently     Drug use: Not Currently        Medications:    amoxicillin-clavulanate (AUGMENTIN) 875-125 MG tablet  [START ON 10/20/2022] amphetamine-dextroamphetamine (ADDERALL XR) 15 MG 24 hr capsule  [START ON 9/20/2022] amphetamine-dextroamphetamine (ADDERALL XR) 15 MG 24 hr capsule  amphetamine-dextroamphetamine (ADDERALL XR) 15 MG 24 hr capsule  cetirizine (ZYRTEC) 10 MG tablet  chlorhexidine (PERIDEX) 0.12 % solution  ibuprofen (ADVIL/MOTRIN)  800 MG tablet      Review of Systems   Constitutional: Negative for chills and fever.   HENT: Positive for dental problem. Negative for facial swelling and trouble swallowing.    Respiratory: Negative for shortness of breath.    Cardiovascular: Negative for chest pain.   Gastrointestinal: Negative for diarrhea, nausea and vomiting.   Musculoskeletal: Negative for gait problem.   Psychiatric/Behavioral: Negative.      Physical Exam   BP: 124/81  Pulse: 107  Temp: 98.7  F (37.1  C)  Resp: 16  SpO2: 95 %  AP: 94    Physical Exam  Vitals and nursing note reviewed.   Constitutional:       General: Radha Mayfield is not in acute distress.     Appearance: Normal appearance. Radha Mayfield is not ill-appearing or toxic-appearing.   HENT:      Head:      Jaw: There is normal jaw occlusion.      Mouth/Throat:      Mouth: Mucous membranes are moist.      Dentition: Dental tenderness and gingival swelling (moderate) present. No dental caries.      Pharynx: Oropharynx is clear. No oropharyngeal exudate or posterior oropharyngeal erythema.        Comments: Overall dentition good.  Tooth #17 is an impacted wisdom tooth.  Dental tenderness upon palpation.  No obvious abscess noted.  Moderate gingival swelling present.  No trismus.  No difficulty swallowing.  No facial swelling.  Cardiovascular:      Rate and Rhythm: Normal rate and regular rhythm.      Pulses: Normal pulses.      Heart sounds: Normal heart sounds.   Pulmonary:      Effort: Pulmonary effort is normal.      Breath sounds: Normal breath sounds.   Neurological:      Mental Status: Radha Mayfield is alert.   Psychiatric:         Mood and Affect: Mood normal.       ED Course     No results found for this or any previous visit (from the past 24 hour(s)).    Medications - No data to display    Assessments & Plan (with Medical Decision Making)     I have reviewed the nursing notes.    I have reviewed the findings, diagnosis, plan and need for follow up with the  patient.  (K08.89) Pain, dental  (primary encounter diagnosis)  Plan:   Patient ambulatory with a nontoxic appearance.  Overall dentition good.  Tooth #17 is an impacted wisdom tooth.  Dental tenderness upon palpation.  No obvious abscess noted.  Moderate gingival swelling present.  No trismus.  No difficulty swallowing.  No facial swelling.  Augmentin as ordered, unable to fully rule out dental infection at this time.  Peridex mouthwash for gingival swelling.  Alternate Tylenol and ibuprofen as needed for pain.  Dental list provided for patient to call around to see who is taking new patients for further evaluation.  Patient to follow-up with primary care provider or return to urgent care-ED with any worsening in condition or additional concerns.  Patient is in agreement with treatment plan.    New Prescriptions    AMOXICILLIN-CLAVULANATE (AUGMENTIN) 875-125 MG TABLET    Take 1 tablet by mouth 2 times daily for 7 days    CHLORHEXIDINE (PERIDEX) 0.12 % SOLUTION    Swish and spit 15 mLs in mouth 2 times daily for 7 days    IBUPROFEN (ADVIL/MOTRIN) 800 MG TABLET    Take 1 tablet (800 mg) by mouth every 8 hours as needed for moderate pain     Final diagnoses:   Pain, dental     9/9/2022   [facility]     Constanza Noel NP  09/09/22 [id number]

## 2022-10-20 ENCOUNTER — MYC REFILL (OUTPATIENT)
Dept: FAMILY MEDICINE | Facility: OTHER | Age: 22
End: 2022-10-20

## 2022-10-20 DIAGNOSIS — F90.9 ATTENTION DEFICIT HYPERACTIVITY DISORDER (ADHD), UNSPECIFIED ADHD TYPE: ICD-10-CM

## 2022-10-24 RX ORDER — DEXTROAMPHETAMINE SACCHARATE, AMPHETAMINE ASPARTATE MONOHYDRATE, DEXTROAMPHETAMINE SULFATE AND AMPHETAMINE SULFATE 3.75; 3.75; 3.75; 3.75 MG/1; MG/1; MG/1; MG/1
15 CAPSULE, EXTENDED RELEASE ORAL DAILY
Qty: 30 CAPSULE | Refills: 0 | OUTPATIENT
Start: 2022-10-24

## 2022-11-09 PROBLEM — F43.10 PTSD (POST-TRAUMATIC STRESS DISORDER): Status: ACTIVE | Noted: 2022-11-09

## 2022-11-09 PROBLEM — F41.9 ANXIETY: Status: ACTIVE | Noted: 2022-11-09

## 2022-11-09 NOTE — PROGRESS NOTES
"  Assessment & Plan     Attention deficit hyperactivity disorder (ADHD), unspecified ADHD type  Pt shows no signs of diversion or abuse. Up to 3 month of his/her ADD/ADHD medications given today. Pt is aware that he/she is solely responsible for protections of the prescriptions. I will not tolerate any lost or stolen prescriptions lightly. Will see pt back in office in 3 months. Stimulant agreement up to date.     - amphetamine-dextroamphetamine (ADDERALL XR) 15 MG 24 hr capsule; Take 1 capsule (15 mg) by mouth daily for 30 days  - amphetamine-dextroamphetamine (ADDERALL XR) 15 MG 24 hr capsule; Take 1 capsule (15 mg) by mouth daily for 30 days  - amphetamine-dextroamphetamine (ADDERALL XR) 15 MG 24 hr capsule; Take 1 capsule (15 mg) by mouth daily for 30 days    PTSD (post-traumatic stress disorder)  Anxiety  Controlled. Continue follow-up with her counselor.     56}     BMI:   Estimated body mass index is 31.35 kg/m  as calculated from the following:    Height as of 2/4/22: 1.6 m (5' 3\").    Weight as of this encounter: 80.3 kg (177 lb).   Weight management plan: Discussed healthy diet and exercise guidelines      No follow-ups on file.    Lissy Perez NP  [facility] - MOISES Garcia is a 22 year old, presenting for the following health issues:  Recheck Medication      HPI     ADHD:   Medication Followup of  Adderall XR 15 mg once daily    Taking Medication as prescribed: yes, no concerns    Side Effects:  None; no chest pain, shortness of breath, dizziness, syncope, insomnia, or palpitations; weight stable   Medication Helping Symptoms:  yes, able to stay on task and focus well.   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 School is going well. Grades good-As and Bs. Also working at [employer]     -She does have some anxiety and PTSD. Seeing a counselor at [facility]. Feels this is helping. No thoughts of suicide or homicide. Some anxiety but is going to therapy. Seeing " Jennifer Carranza in [place], [place] for anxiety and states that it is going well.      Drug screen appropriate in 8/2022. Stimulant agreement done in 10/2022.     She is due for the influenza vaccine. Declines.     She is due for chlamydia testing. Declines.    Review of Systems   Constitutional, HEENT, cardiovascular, pulmonary, gi and gu systems are negative, except as otherwise noted.      Objective    /74   Pulse 112   Temp 97.8  F (36.6  C)   Wt 80.3 kg (177 lb)   SpO2 98%   BMI 31.35 kg/m    Body mass index is 31.35 kg/m .  Physical Exam   GENERAL: healthy, alert and no distress  EYES: Eyes grossly normal to inspection, PERRL and conjunctivae and sclerae normal  HENT: ear canals and TM's normal, nose and mouth without ulcers or lesions  NECK: no adenopathy, no asymmetry, masses, or scars and thyroid normal to palpation  RESP: lungs clear to auscultation - no rales, rhonchi or wheezes  CV: regular rate and rhythm, normal S1 S2, no S3 or S4, no murmur, click or rub, no peripheral edema and peripheral pulses strong  NEURO: Normal strength and tone, mentation intact and speech normal  PSYCH: mentation appears normal, affect normal/bright

## 2022-11-14 ENCOUNTER — OFFICE VISIT (OUTPATIENT)
Dept: FAMILY MEDICINE | Facility: OTHER | Age: 22
End: 2022-11-14
Attending: NURSE PRACTITIONER
Payer: COMMERCIAL

## 2022-11-14 VITALS
TEMPERATURE: 97.8 F | WEIGHT: 177 LBS | SYSTOLIC BLOOD PRESSURE: 118 MMHG | OXYGEN SATURATION: 98 % | HEART RATE: 92 BPM | BODY MASS INDEX: 31.35 KG/M2 | DIASTOLIC BLOOD PRESSURE: 74 MMHG

## 2022-11-14 DIAGNOSIS — F43.10 PTSD (POST-TRAUMATIC STRESS DISORDER): ICD-10-CM

## 2022-11-14 DIAGNOSIS — F90.9 ATTENTION DEFICIT HYPERACTIVITY DISORDER (ADHD), UNSPECIFIED ADHD TYPE: Primary | ICD-10-CM

## 2022-11-14 DIAGNOSIS — F41.9 ANXIETY: ICD-10-CM

## 2022-11-14 PROCEDURE — 99213 OFFICE O/P EST LOW 20 MIN: CPT | Performed by: NURSE PRACTITIONER

## 2022-11-14 PROCEDURE — G0463 HOSPITAL OUTPT CLINIC VISIT: HCPCS

## 2022-11-14 RX ORDER — DEXTROAMPHETAMINE SACCHARATE, AMPHETAMINE ASPARTATE MONOHYDRATE, DEXTROAMPHETAMINE SULFATE AND AMPHETAMINE SULFATE 3.75; 3.75; 3.75; 3.75 MG/1; MG/1; MG/1; MG/1
15 CAPSULE, EXTENDED RELEASE ORAL DAILY
Qty: 30 CAPSULE | Refills: 0 | Status: SHIPPED | OUTPATIENT
Start: 2023-01-18 | End: 2023-02-17

## 2022-11-14 RX ORDER — DEXTROAMPHETAMINE SACCHARATE, AMPHETAMINE ASPARTATE MONOHYDRATE, DEXTROAMPHETAMINE SULFATE AND AMPHETAMINE SULFATE 3.75; 3.75; 3.75; 3.75 MG/1; MG/1; MG/1; MG/1
15 CAPSULE, EXTENDED RELEASE ORAL DAILY
Qty: 30 CAPSULE | Refills: 0 | Status: SHIPPED | OUTPATIENT
Start: 2022-12-19 | End: 2023-01-18

## 2022-11-14 RX ORDER — DEXTROAMPHETAMINE SACCHARATE, AMPHETAMINE ASPARTATE MONOHYDRATE, DEXTROAMPHETAMINE SULFATE AND AMPHETAMINE SULFATE 3.75; 3.75; 3.75; 3.75 MG/1; MG/1; MG/1; MG/1
15 CAPSULE, EXTENDED RELEASE ORAL DAILY
Qty: 30 CAPSULE | Refills: 0 | Status: SHIPPED | OUTPATIENT
Start: 2022-11-19 | End: 2022-12-19

## 2022-11-14 ASSESSMENT — PAIN SCALES - GENERAL: PAINLEVEL: NO PAIN (0)

## 2022-11-14 NOTE — NURSING NOTE
"Chief Complaint   Patient presents with     Recheck Medication       Initial /74   Pulse 112   Temp 97.8  F (36.6  C)   Wt 80.3 kg (177 lb)   SpO2 98%   BMI 31.35 kg/m   Estimated body mass index is 31.35 kg/m  as calculated from the following:    Height as of 2/4/22: 1.6 m (5' 3\").    Weight as of this encounter: 80.3 kg (177 lb).  Medication Reconciliation: complete  Mishel Lamar LPN  "

## 2022-12-17 ENCOUNTER — HOSPITAL ENCOUNTER (EMERGENCY)
Facility: HOSPITAL | Age: 22
Discharge: HOME | End: 2022-12-17
Attending: PHYSICIAN ASSISTANT | Admitting: PHYSICIAN ASSISTANT
Payer: COMMERCIAL

## 2022-12-17 VITALS
SYSTOLIC BLOOD PRESSURE: 128 MMHG | OXYGEN SATURATION: 95 % | RESPIRATION RATE: 16 BRPM | DIASTOLIC BLOOD PRESSURE: 85 MMHG | TEMPERATURE: 102 F | HEART RATE: 129 BPM

## 2022-12-17 DIAGNOSIS — J10.1 INFLUENZA A: ICD-10-CM

## 2022-12-17 LAB
FLUAV RNA SPEC QL NAA+PROBE: POSITIVE
FLUBV RNA RESP QL NAA+PROBE: NEGATIVE
RSV RNA SPEC NAA+PROBE: NEGATIVE
SARS-COV-2 RNA RESP QL NAA+PROBE: NEGATIVE

## 2022-12-17 PROCEDURE — C9803 HOPD COVID-19 SPEC COLLECT: HCPCS

## 2022-12-17 PROCEDURE — G0463 HOSPITAL OUTPT CLINIC VISIT: HCPCS

## 2022-12-17 PROCEDURE — 99213 OFFICE O/P EST LOW 20 MIN: CPT | Performed by: PHYSICIAN ASSISTANT

## 2022-12-17 PROCEDURE — 87637 SARSCOV2&INF A&B&RSV AMP PRB: CPT | Performed by: PHYSICIAN ASSISTANT

## 2022-12-17 ASSESSMENT — ACTIVITIES OF DAILY LIVING (ADL): ADLS_ACUITY_SCORE: 35

## 2022-12-17 NOTE — Clinical Note
Radha Mayfield was seen and treated in our emergency department on 12/17/2022.  Radha Mayfield may return to work on 12/19/2022.  Pt is excused from with this past week due to influenza. She may return to work once fever free x 24 hours.      If you have any questions or concerns, please don't hesitate to call.      Paola Puga PA-C

## 2022-12-18 NOTE — DISCHARGE INSTRUCTIONS
Alternate between Ibuprofen (800mg) and Tylenol (650mg-1,000mg) every 4 hours for fever control.  Increase fluids and rest.  Use a humidifier at night.  Return here with any difficulty breathing or new/concerning symptoms.

## 2022-12-18 NOTE — ED TRIAGE NOTES
Patient presents to urgent care with friend for flu symptoms. Patient's symptoms are body aches, fever, cough, headache, sore throat, ear pain, congestion since Tuesday. Patient has been exposed to COVID by a coworker.

## 2022-12-18 NOTE — ED PROVIDER NOTES
History     Chief Complaint   Patient presents with     Cough     HPI  Radha Mayfield is a 22 year old adult who presents with cough, fevers, congestion, and body aches x 4 days. No difficulty breathing. Treating fevers with tylenol and ibuprofen.     Allergies:  Allergies   Allergen Reactions     No Clinical Screening - See Comments      Oxcarbazepine      rash     Chlorine Rash       Problem List:    Patient Active Problem List    Diagnosis Date Noted     PTSD (post-traumatic stress disorder) 11/09/2022     Priority: Medium     Anxiety 11/09/2022     Priority: Medium     H/O absence seizures 10/16/2019     Priority: Medium     Attention deficit hyperactivity disorder (ADHD) 03/23/2007     Priority: Medium     IMO Update 10 2016          Past Medical History:    No past medical history on file.    Past Surgical History:    Past Surgical History:   Procedure Laterality Date     APPENDECTOMY       TONSILLECTOMY & ADENOIDECTOMY         Family History:    Family History   Problem Relation Age of Onset     Scoliosis Mother      Cardiovascular Mother      No Known Problems Father        Social History:  Marital Status:  Single [1]  Social History     Tobacco Use     Smoking status: Never     Smokeless tobacco: Never   Substance Use Topics     Alcohol use: Not Currently     Drug use: Not Currently        Medications:    amphetamine-dextroamphetamine (ADDERALL XR) 15 MG 24 hr capsule  cetirizine (ZYRTEC) 10 MG tablet  ibuprofen (ADVIL/MOTRIN) 800 MG tablet  amphetamine-dextroamphetamine (ADDERALL XR) 15 MG 24 hr capsule  [START ON 12/19/2022] amphetamine-dextroamphetamine (ADDERALL XR) 15 MG 24 hr capsule  [START ON 1/18/2023] amphetamine-dextroamphetamine (ADDERALL XR) 15 MG 24 hr capsule          Review of Systems   All other systems reviewed and are negative.      Physical Exam   BP: 128/85  Pulse: (!) 129  Temp: (!) 102  F (38.9  C)  Resp: 16  SpO2: 95 %      Physical Exam  Vitals and nursing note reviewed.    Constitutional:       General: Radha Mayfield is not in acute distress.     Appearance: Radha Mayfield is well-developed and well-nourished. Radha Mayfield is ill-appearing. Radha Mayfield is not diaphoretic.   HENT:      Head: Normocephalic and atraumatic.      Right Ear: External ear normal.      Nose: Rhinorrhea present.      Mouth/Throat:      Pharynx: Posterior oropharyngeal erythema present. No oropharyngeal exudate.   Eyes:      General: No scleral icterus.        Right eye: No discharge.         Left eye: No discharge.      Extraocular Movements: EOM normal.      Conjunctiva/sclera: Conjunctivae normal.      Pupils: Pupils are equal, round, and reactive to light.   Neck:      Vascular: No JVD.   Cardiovascular:      Rate and Rhythm: Normal rate and regular rhythm.      Pulses: Intact distal pulses.      Heart sounds: Normal heart sounds. No murmur heard.    No friction rub. No gallop.   Pulmonary:      Effort: Pulmonary effort is normal. No respiratory distress.      Breath sounds: Normal breath sounds. No wheezing or rales.   Chest:      Chest wall: No tenderness.   Musculoskeletal:         General: No edema. Normal range of motion.      Cervical back: Normal range of motion and neck supple.   Lymphadenopathy:      Cervical: No cervical adenopathy.   Skin:     General: Skin is warm and dry.      Coloration: Skin is not pale.      Findings: No erythema or rash.   Neurological:      Mental Status: Radha Mayfield is alert and oriented to person, place, and time.      Cranial Nerves: No cranial nerve deficit.      Coordination: Coordination normal.   Psychiatric:         Mood and Affect: Mood and affect normal.         Behavior: Behavior normal.         Thought Content: Thought content normal.         Judgment: Judgment normal.         ED Course                 Procedures            No results found for this or any previous visit (from the past 24 hour(s)).    Medications - No data to  display    Assessments & Plan (with Medical Decision Making)   Exam consistent with influenza. Lungs are CTA. No respiratory distress. Supportive care was recommended.     Plan:   Alternate between Ibuprofen and Tylenol every 4 hours for fever control.  Increase fluids and rest.  Use a humidifier at night.  Return here with any difficulty breathing or new/concerning symptoms.       I have reviewed the nursing notes.    I have reviewed the findings, diagnosis, plan and need for follow up with the patient.      New Prescriptions    No medications on file       Final diagnoses:   Influenza A       12/17/2022   [facility]

## 2023-02-10 NOTE — PROGRESS NOTES
Assessment & Plan     Attention deficit hyperactivity disorder (ADHD), unspecified ADHD type  Pt shows no signs of diversion or abuse. Up to 3 month of his/her ADD/ADHD medications given today. Pt is aware that he/she is solely responsible for protections of the prescriptions. I will not tolerate any lost or stolen prescriptions lightly. Will see pt back in office in 3 months. Stimulant agreement up to date.  reviewed and accurate.     - amphetamine-dextroamphetamine (ADDERALL XR) 15 MG 24 hr capsule; Take 1 capsule (15 mg) by mouth daily  - amphetamine-dextroamphetamine (ADDERALL XR) 15 MG 24 hr capsule; Take 1 capsule (15 mg) by mouth daily  - amphetamine-dextroamphetamine (ADDERALL XR) 15 MG 24 hr capsule; Take 1 capsule (15 mg) by mouth daily    PTSD (post-traumatic stress disorder)  Controlled with counseling. Will continue.     Anxiety  Controlled with counseling. Will continue.     Screen for STD (sexually transmitted disease)  - GC/Chlamydia by PCR - [facility],; Future  -Will notify patient of the results when available and intervene accordingly.     Obesity (BMI 30.0-34.9)  BMI 31.27. Diet and exercise encouraged.         Return in about 3 months (around 5/14/2023).    Lissy Perez NP  [facility] - MOISES Garcia is a 23 year old, presenting for the following health issues:  A.D.H.D      HPI     ADHD:   Medication Followup of  Adderall XR 15 mg once daily    Taking Medication as prescribed: yes, no concerns    Side Effects:  None; no chest pain, shortness of breath, dizziness, syncope, insomnia, or palpitations; weight stable   Medication Helping Symptoms:  yes, able to stay on task and focus well.   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 School is going well. Grades good-As and Bs. Also working at [employer]     -She does have some anxiety and PTSD. Seeing a counselor at [facility]. Feels this is helping. No thoughts of suicide or homicide. Seeing Jennifer  "Tere in [place], [place] for anxiety and states that it is going well.      Drug screen appropriate in 8/2022. Stimulant agreement done in 10/2022.      She is due for the influenza vaccine. Willing to get.       She is due for chlamydia testing. Willing to get.     Review of Systems   Constitutional, HEENT, cardiovascular, pulmonary, gi and gu systems are negative, except as otherwise noted.      Objective    /80 (BP Location: Left arm, Patient Position: Sitting, Cuff Size: Adult Regular)   Pulse 96   Temp 98.2  F (36.8  C) (Tympanic)   Ht 1.6 m (5' 3\")   Wt 80.1 kg (176 lb 8 oz)   LMP 02/05/2023 (Exact Date)   SpO2 98%   BMI 31.27 kg/m    Body mass index is 31.27 kg/m .  Physical Exam   GENERAL: alert, no distress and over weight  EYES: Eyes grossly normal to inspection, PERRL and conjunctivae and sclerae normal  HENT: ear canals and TM's normal, nose and mouth without ulcers or lesions  NECK: no adenopathy, no asymmetry, masses, or scars and thyroid normal to palpation  RESP: lungs clear to auscultation - no rales, rhonchi or wheezes  CV: regular rate and rhythm, no murmur, click or rub, no peripheral edema  NEURO: Normal strength and tone, mentation intact and speech normal  PSYCH: mentation appears normal, affect normal/bright                    "

## 2023-02-14 ENCOUNTER — OFFICE VISIT (OUTPATIENT)
Dept: FAMILY MEDICINE | Facility: OTHER | Age: 23
End: 2023-02-14
Attending: NURSE PRACTITIONER
Payer: COMMERCIAL

## 2023-02-14 VITALS
WEIGHT: 176.5 LBS | TEMPERATURE: 98.2 F | DIASTOLIC BLOOD PRESSURE: 80 MMHG | BODY MASS INDEX: 31.27 KG/M2 | HEIGHT: 63 IN | OXYGEN SATURATION: 98 % | HEART RATE: 96 BPM | SYSTOLIC BLOOD PRESSURE: 120 MMHG

## 2023-02-14 DIAGNOSIS — Z11.3 SCREEN FOR STD (SEXUALLY TRANSMITTED DISEASE): ICD-10-CM

## 2023-02-14 DIAGNOSIS — F43.10 PTSD (POST-TRAUMATIC STRESS DISORDER): ICD-10-CM

## 2023-02-14 DIAGNOSIS — F90.9 ATTENTION DEFICIT HYPERACTIVITY DISORDER (ADHD), UNSPECIFIED ADHD TYPE: Primary | ICD-10-CM

## 2023-02-14 DIAGNOSIS — F41.9 ANXIETY: ICD-10-CM

## 2023-02-14 DIAGNOSIS — E66.811 OBESITY (BMI 30.0-34.9): ICD-10-CM

## 2023-02-14 LAB
C TRACH DNA SPEC QL PROBE+SIG AMP: NEGATIVE
N GONORRHOEA DNA SPEC QL NAA+PROBE: NEGATIVE

## 2023-02-14 PROCEDURE — 87491 CHLMYD TRACH DNA AMP PROBE: CPT | Mod: ZL | Performed by: NURSE PRACTITIONER

## 2023-02-14 PROCEDURE — 87591 N.GONORRHOEAE DNA AMP PROB: CPT | Mod: ZL | Performed by: NURSE PRACTITIONER

## 2023-02-14 PROCEDURE — 90686 IIV4 VACC NO PRSV 0.5 ML IM: CPT

## 2023-02-14 PROCEDURE — G0463 HOSPITAL OUTPT CLINIC VISIT: HCPCS | Mod: 25

## 2023-02-14 PROCEDURE — G0463 HOSPITAL OUTPT CLINIC VISIT: HCPCS

## 2023-02-14 PROCEDURE — 99213 OFFICE O/P EST LOW 20 MIN: CPT | Performed by: NURSE PRACTITIONER

## 2023-02-14 PROCEDURE — G0008 ADMIN INFLUENZA VIRUS VAC: HCPCS

## 2023-02-14 RX ORDER — DEXTROAMPHETAMINE SACCHARATE, AMPHETAMINE ASPARTATE MONOHYDRATE, DEXTROAMPHETAMINE SULFATE AND AMPHETAMINE SULFATE 3.75; 3.75; 3.75; 3.75 MG/1; MG/1; MG/1; MG/1
15 CAPSULE, EXTENDED RELEASE ORAL DAILY
Qty: 30 CAPSULE | Refills: 0 | Status: SHIPPED | OUTPATIENT
Start: 2023-04-13 | End: 2023-05-16

## 2023-02-14 RX ORDER — DEXTROAMPHETAMINE SACCHARATE, AMPHETAMINE ASPARTATE MONOHYDRATE, DEXTROAMPHETAMINE SULFATE AND AMPHETAMINE SULFATE 3.75; 3.75; 3.75; 3.75 MG/1; MG/1; MG/1; MG/1
15 CAPSULE, EXTENDED RELEASE ORAL DAILY
Qty: 30 CAPSULE | Refills: 0 | Status: SHIPPED | OUTPATIENT
Start: 2023-02-15 | End: 2023-05-16

## 2023-02-14 RX ORDER — DEXTROAMPHETAMINE SACCHARATE, AMPHETAMINE ASPARTATE MONOHYDRATE, DEXTROAMPHETAMINE SULFATE AND AMPHETAMINE SULFATE 3.75; 3.75; 3.75; 3.75 MG/1; MG/1; MG/1; MG/1
15 CAPSULE, EXTENDED RELEASE ORAL DAILY
Qty: 30 CAPSULE | Refills: 0 | Status: SHIPPED | OUTPATIENT
Start: 2023-03-15 | End: 2023-05-16

## 2023-02-14 ASSESSMENT — PAIN SCALES - GENERAL: PAINLEVEL: NO PAIN (0)

## 2023-04-29 ENCOUNTER — HEALTH MAINTENANCE LETTER (OUTPATIENT)
Age: 23
End: 2023-04-29

## 2023-05-11 ENCOUNTER — HOSPITAL ENCOUNTER (EMERGENCY)
Facility: HOSPITAL | Age: 23
Discharge: HOME | End: 2023-05-11
Payer: COMMERCIAL

## 2023-05-11 VITALS
TEMPERATURE: 98 F | DIASTOLIC BLOOD PRESSURE: 79 MMHG | SYSTOLIC BLOOD PRESSURE: 124 MMHG | HEART RATE: 94 BPM | RESPIRATION RATE: 16 BRPM | WEIGHT: 170 LBS | OXYGEN SATURATION: 100 % | HEIGHT: 63 IN | BODY MASS INDEX: 30.12 KG/M2

## 2023-05-11 DIAGNOSIS — H00.015 HORDEOLUM EXTERNUM OF LEFT LOWER EYELID: ICD-10-CM

## 2023-05-11 PROCEDURE — G0463 HOSPITAL OUTPT CLINIC VISIT: HCPCS

## 2023-05-11 PROCEDURE — 99213 OFFICE O/P EST LOW 20 MIN: CPT

## 2023-05-11 RX ORDER — ERYTHROMYCIN 5 MG/G
0.5 OINTMENT OPHTHALMIC 3 TIMES DAILY
Qty: 10.5 G | Refills: 0 | Status: SHIPPED | OUTPATIENT
Start: 2023-05-11 | End: 2023-05-18

## 2023-05-11 ASSESSMENT — ENCOUNTER SYMPTOMS
EYE REDNESS: 0
APPETITE CHANGE: 0
EYE DISCHARGE: 0
ACTIVITY CHANGE: 0
CHILLS: 0
COUGH: 0
EYE ITCHING: 0
EYE PAIN: 1
DIZZINESS: 0
PHOTOPHOBIA: 0
FEVER: 0
HEADACHES: 0

## 2023-05-11 ASSESSMENT — VISUAL ACUITY
OU: 1
OS: 20/25
OD: 20/25

## 2023-05-11 NOTE — DISCHARGE INSTRUCTIONS
Continue with the warm compresses at least 4 times daily for 5-10 minutes     Tylenol and ibuprofen as needed.     Follow up with eye doctor if no improvement or with worsening of symptoms in 48 hours.     Return with any concerns.

## 2023-05-11 NOTE — ED PROVIDER NOTES
History     Chief Complaint   Patient presents with     Eye Problem     HPI  Radha Mayfield is a 23 year old adult who presents to the urgent care with a four day history of redness and irritation to left lower eyelid. She denies fevers, chills, visual changes, and recent illness. She does not wear contacts. She has been attempting warm compresses without reduction in redness or pain.     Allergies:  Allergies   Allergen Reactions     No Clinical Screening - See Comments      Oxcarbazepine      rash     Chlorine Rash       Problem List:    Patient Active Problem List    Diagnosis Date Noted     PTSD (post-traumatic stress disorder) 11/09/2022     Priority: Medium     Anxiety 11/09/2022     Priority: Medium     H/O absence seizures 10/16/2019     Priority: Medium     Attention deficit hyperactivity disorder (ADHD) 03/23/2007     Priority: Medium     IMO Update 10 2016          Past Medical History:    History reviewed. No pertinent past medical history.    Past Surgical History:    Past Surgical History:   Procedure Laterality Date     APPENDECTOMY       TONSILLECTOMY & ADENOIDECTOMY         Family History:    Family History   Problem Relation Age of Onset     Scoliosis Mother      Cardiovascular Mother      No Known Problems Father        Social History:  Marital Status:  Single [1]  Social History     Tobacco Use     Smoking status: Never     Smokeless tobacco: Never   Substance Use Topics     Alcohol use: Not Currently     Drug use: Not Currently        Medications:    amphetamine-dextroamphetamine (ADDERALL XR) 15 MG 24 hr capsule  amphetamine-dextroamphetamine (ADDERALL XR) 15 MG 24 hr capsule  amphetamine-dextroamphetamine (ADDERALL XR) 15 MG 24 hr capsule  cetirizine (ZYRTEC) 10 MG tablet  erythromycin (ROMYCIN) 5 MG/GM ophthalmic ointment  ibuprofen (ADVIL/MOTRIN) 800 MG tablet          Review of Systems   Constitutional: Negative for activity change, appetite change, chills and fever.   Eyes: Positive  "for pain. Negative for photophobia, discharge, redness, itching and visual disturbance.   Respiratory: Negative for cough.    Skin: Negative for rash.   Neurological: Negative for dizziness and headaches.   All other systems reviewed and are negative.      Physical Exam   BP: 124/79  Pulse: 94  Temp: 98  F (36.7  C)  Resp: 16  Height: 160 cm (5' 3\")  Weight: 77.1 kg (170 lb)  SpO2: 100 %      Physical Exam  Vitals and nursing note reviewed.   Constitutional:       Appearance: Normal appearance.   HENT:      Mouth/Throat:      Mouth: Mucous membranes are moist.      Pharynx: Oropharynx is clear. No oropharyngeal exudate or posterior oropharyngeal erythema.   Eyes:      General: Vision grossly intact. No visual field deficit or scleral icterus.        Right eye: No discharge or hordeolum.         Left eye: Hordeolum present.No discharge.      Extraocular Movements: Extraocular movements intact.      Pupils: Pupils are equal, round, and reactive to light.     Cardiovascular:      Rate and Rhythm: Normal rate and regular rhythm.      Pulses: Normal pulses.      Heart sounds: Normal heart sounds. No murmur heard.  Pulmonary:      Effort: No respiratory distress.      Breath sounds: Normal breath sounds. No stridor. No wheezing or rhonchi.   Neurological:      Mental Status: Radha Mayfield is alert.         ED Course                 Procedures                No results found for this or any previous visit (from the past 24 hour(s)).    Medications - No data to display    Assessments & Plan (with Medical Decision Making)     I have reviewed the nursing notes.    I have reviewed the findings, diagnosis, plan and need for follow up with the patient.  Radha Mayfield is a 23 year old adult who presents to the urgent care with a four day history of redness and irritation to left lower eyelid. She denies fevers, chills, visual changes, and recent illness. She does not wear contacts. She has been attempting warm compresses " without reduction in redness or pain.       MDM: VSS and afebrile. Lungs clear, heart tones regular. Visual acuity 20/25 bilaterally, corrected with glasses. I encouraged her to continue the warm compresses at least 4 times daily for 5-10 minutes. She is to follow up with opthalmology if no improvement in 48 hours or if worsening.       (H00.015) Hordeolum externum of left lower eyelid  Plan: warm compresses 4 times daily. Erythromycin prescribed. Tylenol and ibuprofen as needed. Return with any increased redness, increased pain, fevers, chills, visual impairment, or concerns. Follow up ophthalmology if no improvement in 48 hours.           New Prescriptions    ERYTHROMYCIN (ROMYCIN) 5 MG/GM OPHTHALMIC OINTMENT    Place 0.5 inches Into the left eye 3 times daily for 7 days       Final diagnoses:   Hordeolum externum of left lower eyelid       5/11/2023   [facility]     Louise Carey NP  05/11/23 4082

## 2023-05-11 NOTE — ED TRIAGE NOTES
Pt presents with c/o sty on right eye   Pt having a hard time blinking and hurts to the touch  No visual change   S/x started Tuesday.  Pt has been doing warm compress which has had some relief.      Triage Assessment     Row Name 05/11/23 1533       Triage Assessment (Adult)    Airway WDL WDL       Respiratory WDL    Respiratory WDL WDL       Skin Circulation/Temperature WDL    Skin Circulation/Temperature WDL WDL       Cardiac WDL    Cardiac WDL WDL       Peripheral/Neurovascular WDL    Peripheral Neurovascular WDL WDL       Cognitive/Neuro/Behavioral WDL    Cognitive/Neuro/Behavioral WDL WDL

## 2023-05-11 NOTE — ED TRIAGE NOTES
Developed a sty in left eye no visual changes at this time      Triage Assessment     Row Name 05/11/23 1533       Triage Assessment (Adult)    Airway WDL WDL       Respiratory WDL    Respiratory WDL WDL       Skin Circulation/Temperature WDL    Skin Circulation/Temperature WDL WDL       Cardiac WDL    Cardiac WDL WDL       Peripheral/Neurovascular WDL    Peripheral Neurovascular WDL WDL       Cognitive/Neuro/Behavioral WDL    Cognitive/Neuro/Behavioral WDL WDL

## 2023-05-14 NOTE — PROGRESS NOTES
"  Assessment & Plan     Attention deficit hyperactivity disorder (ADHD), unspecified ADHD type  Pt shows no signs of diversion or abuse. Up to 3 month of his/her ADD/ADHD medications given today. Pt is aware that he/she is solely responsible for protections of the prescriptions. I will not tolerate any lost or stolen prescriptions lightly. Will see pt back in office in 3 months. Stimulant agreement up to date.     - amphetamine-dextroamphetamine (ADDERALL XR) 15 MG 24 hr capsule; Take 1 capsule (15 mg) by mouth daily  - amphetamine-dextroamphetamine (ADDERALL XR) 15 MG 24 hr capsule; Take 1 capsule (15 mg) by mouth daily  - amphetamine-dextroamphetamine (ADDERALL XR) 15 MG 24 hr capsule; Take 1 capsule (15 mg) by mouth daily    Anxiety  PTSD (post-traumatic stress disorder)  Stable. Continue follow-up with [facility]. Reassess 3 months. Sooner with new or worsening symptoms.     Pain in wrist, unspecified laterality  No red flags. Most likely a tendinitis due to repetitive motion. Will place in wrist brace and reassess in 3 weeks. Will wear brace as often as able. Rest, ice, and NSAIDs also encouraged. Will return sooner with new or worsening symptoms.       56}     BMI:   Estimated body mass index is 30.82 kg/m  as calculated from the following:    Height as of this encounter: 1.6 m (5' 3\").    Weight as of this encounter: 78.9 kg (174 lb).   Weight management plan: Discussed healthy diet and exercise guidelines      Lissy Perez NP  [facility] - MOISES Garcia is a 23 year old, presenting for the following health issues:  A.D.H.D and right wrist pain       HPI     ADHD:   Medication Followup of  Adderall XR 15 mg once daily    Taking Medication as prescribed: yes, no concerns    Side Effects:  None; no chest pain, shortness of breath, dizziness, syncope, insomnia, or palpitations; weight stable   Medication Helping Symptoms:  yes, able to stay on task and focus well.   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 School is going well. Grades good-As and Bs. Also working at [employer].      -She does have some anxiety and PTSD. [facility] Feels this is helping. No thoughts of suicide or homicide. Seeing Jenniferjill Carranza in [place], [place] for anxiety and states that it is going well.     Drug screen appropriate in 8/2022. Stimulant agreement done in 10/2022.     Right Wrist Pain History:  When did you first notice your pain? 2 weeks ago   Have you seen anyone else for your pain? No  How has your pain affected your ability to work? 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 Musculoskeletal problem/pain  Onset/Duration: 2 weeks ago  Description  Location: wrist - right  Joint Swelling: No  Redness: No  Pain: YES  Warmth: No  Intensity:  moderate  Progression of Symptoms:  constant  Accompanying signs and symptoms:   Fevers: No  Numbness/tingling/weakness: No  History  Trauma to the area: No  Recent illness:  No  Previous similar problem: No  Previous evaluation:  No  Precipitating or alleviating factors:  Aggravating factors include: repeative motion, does fold boxes at [employer]  Therapies tried and outcome: nothing  No known injury, but often doing repetitive movements at work.     Review of Systems   Constitutional, HEENT, cardiovascular, pulmonary, gi and gu systems are negative, except as otherwise noted.      Objective    /80 (BP Location: Left arm, Patient Position: Sitting, Cuff Size: Adult Regular)   Pulse 108   Temp 98.3  F (36.8  C) (Tympanic)   Ht 1.6 m (5' 3\")   Wt 78.9 kg (174 lb)   SpO2 97%   BMI 30.82 kg/m    Body mass index is 30.82 kg/m .  Physical Exam   GENERAL: overweight, alert and no distress  EYES: Eyes grossly normal to inspection, PERRL and conjunctivae and sclerae normal  HENT: ear canals and TM's normal, nose and mouth without ulcers or lesions  NECK: no adenopathy, no " asymmetry, masses, or scars and thyroid normal to palpation  RESP: lungs clear to auscultation - no rales, rhonchi or wheezes  CV: regular rate and rhythm, no murmur, no peripheral edema and peripheral pulses strong  NEURO: Normal strength and tone, mentation intact and speech normal  PSYCH: mentation appears normal, affect normal/bright  RIGHT WRIST-Skin intact. No erythema, edema, or ecchymosis. Radial Pulse 2+. Normal sensation. Brisk cap refill. No pain with palpation, but she does have pain with flexion and extension of her wrist.

## 2023-05-16 ENCOUNTER — OFFICE VISIT (OUTPATIENT)
Dept: FAMILY MEDICINE | Facility: OTHER | Age: 23
End: 2023-05-16
Attending: NURSE PRACTITIONER
Payer: COMMERCIAL

## 2023-05-16 VITALS
SYSTOLIC BLOOD PRESSURE: 122 MMHG | WEIGHT: 174 LBS | DIASTOLIC BLOOD PRESSURE: 80 MMHG | BODY MASS INDEX: 30.83 KG/M2 | HEIGHT: 63 IN | TEMPERATURE: 98.3 F | OXYGEN SATURATION: 97 % | HEART RATE: 108 BPM

## 2023-05-16 DIAGNOSIS — F90.9 ATTENTION DEFICIT HYPERACTIVITY DISORDER (ADHD), UNSPECIFIED ADHD TYPE: Primary | ICD-10-CM

## 2023-05-16 DIAGNOSIS — F41.9 ANXIETY: ICD-10-CM

## 2023-05-16 DIAGNOSIS — M25.539 PAIN IN WRIST, UNSPECIFIED LATERALITY: ICD-10-CM

## 2023-05-16 DIAGNOSIS — F43.10 PTSD (POST-TRAUMATIC STRESS DISORDER): ICD-10-CM

## 2023-05-16 PROCEDURE — G0463 HOSPITAL OUTPT CLINIC VISIT: HCPCS

## 2023-05-16 PROCEDURE — 99213 OFFICE O/P EST LOW 20 MIN: CPT | Performed by: NURSE PRACTITIONER

## 2023-05-16 RX ORDER — DEXTROAMPHETAMINE SACCHARATE, AMPHETAMINE ASPARTATE MONOHYDRATE, DEXTROAMPHETAMINE SULFATE AND AMPHETAMINE SULFATE 3.75; 3.75; 3.75; 3.75 MG/1; MG/1; MG/1; MG/1
15 CAPSULE, EXTENDED RELEASE ORAL DAILY
Qty: 30 CAPSULE | Refills: 0 | Status: SHIPPED | OUTPATIENT
Start: 2023-06-16 | End: 2023-08-16

## 2023-05-16 RX ORDER — DEXTROAMPHETAMINE SACCHARATE, AMPHETAMINE ASPARTATE MONOHYDRATE, DEXTROAMPHETAMINE SULFATE AND AMPHETAMINE SULFATE 3.75; 3.75; 3.75; 3.75 MG/1; MG/1; MG/1; MG/1
15 CAPSULE, EXTENDED RELEASE ORAL DAILY
Qty: 30 CAPSULE | Refills: 0 | Status: SHIPPED | OUTPATIENT
Start: 2023-05-18 | End: 2023-06-27

## 2023-05-16 RX ORDER — DEXTROAMPHETAMINE SACCHARATE, AMPHETAMINE ASPARTATE MONOHYDRATE, DEXTROAMPHETAMINE SULFATE AND AMPHETAMINE SULFATE 3.75; 3.75; 3.75; 3.75 MG/1; MG/1; MG/1; MG/1
15 CAPSULE, EXTENDED RELEASE ORAL DAILY
Qty: 30 CAPSULE | Refills: 0 | Status: SHIPPED | OUTPATIENT
Start: 2023-06-16 | End: 2023-06-27

## 2023-05-16 ASSESSMENT — PAIN SCALES - GENERAL: PAINLEVEL: SEVERE PAIN (7)

## 2023-06-04 NOTE — PROGRESS NOTES
Assessment & Plan     Right wrist pain  Since her pain persists, will order XR. I do, however, suspect it will be normal. Pain is not getting better, but it is most likely due to the repetitive motions she does at work. Will have her continue to wear her thumb spica, but also give her a letter for work asking if she can do a different task for the next 4 weeks. Will then reassess in 4 weeks. Rest, ice, and NSAIDs also recommended. If no improvement in 4 weeks, will send to ortho.     - XR Wrist Right G/E 3 Views (Clinic Performed); Future        Lissy Perez NP  [facility] - MOISES Garcia is a 23 year old, presenting for the following health issues:  Musculoskeletal Problem      HPI     Concern -Wrist Pain-Right Follow-up  Onset: 5/2/2023  Description: seen on 5/16/23, see note. Placed in thumb spica. Seemed to have a tendinitis. Today she notes that she continues to have right wrist pain. 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 Pain typically worsens at work due to the repetitive motions of folding boxes.   Intensity: moderate, severe  Progression of Symptoms:  same and constant  Accompanying Signs & Symptoms: No fevers. No erythema or swelling. No numbness or tingling.   Previous history of similar problem: No   Precipitating factors:        Worsened by: Wrist movement   Alleviating factors:        Improved by: Resting wrist, not doing repetitive movement, wearing thumb spica   Therapies tried and outcome: Wear wrist brace-helps when able to wear this, but she can't wear this at work  No known injury.           Review of Systems   Constitutional, HEENT, cardiovascular, pulmonary, gi and gu systems are negative, except as otherwise noted.      Objective    /79 (BP Location: Right arm, Patient Position: Sitting, Cuff Size: Adult Large)   Pulse 114   Temp 98.8  F (37.1  C) (Tympanic)   Wt 79.4 kg (175 lb)   LMP 05/21/2023 (Approximate)   SpO2  95%   BMI 31.00 kg/m    Body mass index is 31 kg/m .  Physical Exam   GENERAL: overweight, alert and no distress  NEURO: Normal strength and tone, mentation intact and speech normal  PSYCH: mentation appears normal, affect normal/bright  RIGHT WRIST-Skin intact. No erythema, edema, or ecchymosis. Radial Pulse 2+. Normal sensation. Brisk cap refill. No pain with palpation, but she does have pain with flexion and extension of her wrist.     XR pending

## 2023-06-06 ENCOUNTER — OFFICE VISIT (OUTPATIENT)
Dept: FAMILY MEDICINE | Facility: OTHER | Age: 23
End: 2023-06-06
Attending: NURSE PRACTITIONER
Payer: COMMERCIAL

## 2023-06-06 ENCOUNTER — ANCILLARY PROCEDURE (OUTPATIENT)
Dept: GENERAL RADIOLOGY | Facility: OTHER | Age: 23
End: 2023-06-06
Attending: NURSE PRACTITIONER
Payer: COMMERCIAL

## 2023-06-06 VITALS
TEMPERATURE: 98.8 F | DIASTOLIC BLOOD PRESSURE: 79 MMHG | BODY MASS INDEX: 31 KG/M2 | HEART RATE: 114 BPM | WEIGHT: 175 LBS | OXYGEN SATURATION: 95 % | SYSTOLIC BLOOD PRESSURE: 116 MMHG

## 2023-06-06 DIAGNOSIS — M25.531 RIGHT WRIST PAIN: Primary | ICD-10-CM

## 2023-06-06 DIAGNOSIS — M25.531 RIGHT WRIST PAIN: ICD-10-CM

## 2023-06-06 PROCEDURE — 99213 OFFICE O/P EST LOW 20 MIN: CPT | Performed by: NURSE PRACTITIONER

## 2023-06-06 PROCEDURE — G0463 HOSPITAL OUTPT CLINIC VISIT: HCPCS

## 2023-06-06 PROCEDURE — 73110 X-RAY EXAM OF WRIST: CPT | Mod: TC,RT

## 2023-06-06 ASSESSMENT — PAIN SCALES - GENERAL: PAINLEVEL: SEVERE PAIN (7)

## 2023-06-06 NOTE — LETTER
June 6, 2023      Radha Mayfield  [address]  [place] [address]        To Whom It May Concern:    Radha Mayfield was seen in our clinic today, 6/6/2023. She has been having right wrist pain. Please allow her to wear her brace. 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 Will then see her back in 4 weeks to reassess.       Sincerely,        Lissy Perez, NP

## 2023-06-27 NOTE — PROGRESS NOTES
Assessment & Plan     Right wrist pain  Pain resolved. Ok to stop wearing her brace. Will return with new or worsening symptoms.       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 No longer having pain. Does use her brace intermittently.     Intensity: mild  Progression of Symptoms:  improving  Accompanying Signs & Symptoms: none; no swelling or erythema  Previous history of similar problem: NA  Precipitating factors:        Worsened by: NA  Alleviating factors:        Improved by: Helped by doing different task at work  Therapies tried and outcome:  She does wear a brace at work  XR was negative on 6/6/23.   No known injury.         Review of Systems   Constitutional, HEENT, cardiovascular, pulmonary, gi and gu systems are negative, except as otherwise noted.      Objective    /76   Pulse 105   Temp 98.9  F (37.2  C) (Tympanic)   Resp 17   Wt 80.3 kg (177 lb)   LMP 05/21/2023 (Approximate)   SpO2 96%   BMI 31.35 kg/m    Body mass index is 31.35 kg/m .  Physical Exam   GENERAL: healthy, alert and no distress  PSYCH: mentation appears normal, anxious  RIGHT WRIST PAIN-Skin intact. No erythema, edema, or ecchymosis. Radial Pulse 2+. Normal sensation. Brisk cap refill. No pain with palpation, but she does have pain with flexion and extension of her wrist.

## 2023-06-28 ENCOUNTER — OFFICE VISIT (OUTPATIENT)
Dept: FAMILY MEDICINE | Facility: OTHER | Age: 23
End: 2023-06-28
Attending: NURSE PRACTITIONER
Payer: COMMERCIAL

## 2023-06-28 VITALS
HEART RATE: 105 BPM | RESPIRATION RATE: 17 BRPM | TEMPERATURE: 98.9 F | BODY MASS INDEX: 31.35 KG/M2 | DIASTOLIC BLOOD PRESSURE: 76 MMHG | SYSTOLIC BLOOD PRESSURE: 123 MMHG | WEIGHT: 177 LBS | OXYGEN SATURATION: 96 %

## 2023-06-28 DIAGNOSIS — M25.531 RIGHT WRIST PAIN: Primary | ICD-10-CM

## 2023-06-28 PROCEDURE — G0463 HOSPITAL OUTPT CLINIC VISIT: HCPCS

## 2023-06-28 PROCEDURE — 99212 OFFICE O/P EST SF 10 MIN: CPT | Performed by: NURSE PRACTITIONER

## 2023-06-28 PROCEDURE — G0463 HOSPITAL OUTPT CLINIC VISIT: HCPCS | Mod: 25

## 2023-06-28 ASSESSMENT — PAIN SCALES - GENERAL: PAINLEVEL: NO PAIN (0)

## 2023-08-08 NOTE — PROGRESS NOTES
"  Assessment & Plan     Attention deficit hyperactivity disorder (ADHD), unspecified ADHD type  Pt shows no signs of diversion or abuse. Up to 3 month of his/her ADD/ADHD medications given today. Pt is aware that he/she is solely responsible for protections of the prescriptions. I will not tolerate any lost or stolen prescriptions lightly. Will see pt back in office in 3 months. Stimulant agreement and drug screen updated today.     - amphetamine-dextroamphetamine (ADDERALL XR) 15 MG 24 hr capsule; Take 1 capsule (15 mg) by mouth daily  - amphetamine-dextroamphetamine (ADDERALL XR) 15 MG 24 hr capsule; Take 1 capsule (15 mg) by mouth daily  - amphetamine-dextroamphetamine (ADDERALL XR) 15 MG 24 hr capsule; Take 1 capsule (15 mg) by mouth daily    Anxiety  PTSD (post-traumatic stress disorder)  Stable. Continue follow-up with [facility]. Reassess 3 months. Sooner with new or worsening symptoms.     Pelvic pain in female  Menorrhagia with regular cycle  Patient with heavy menses and intermittent pelvic pain. Has not tolerated a pap in the past by myself or OB-GYN. Strong family h/o endometriosis. Will update labs today, including a TSH, but also send to OB-GYN to re-attempt pap. Also needs pelvic US, but will defer to OB-GYN as she has not tolerated a pelvic exam in the past.     Tachycardia   with auscultation, but EKG with VR of 94. NSR. Will continue to monitor. If increases further, will order heart monitor.     56}     BMI:   Estimated body mass index is 31.53 kg/m  as calculated from the following:    Height as of this encounter: 1.6 m (5' 3\").    Weight as of this encounter: 80.7 kg (178 lb).   Weight management plan: Discussed healthy diet and exercise guidelines      Lissy Perez NP  [facility] - MOISES Garcia is a 23 year old, presenting for the following health issues:  A.D.H.D      HPI     ADHD:   Medication Followup of  Adderall XR 15 mg once daily  Taking Medication as " "prescribed: yes, no concerns   Side Effects:  None; no chest pain, shortness of breath, dizziness, syncope, insomnia, or palpitations; weight stable   Medication Helping Symptoms:  yes, able to stay on task and focus well.   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 School is going well. Grades good-As and Bs. Also working at [employer]. Going well.      -She does have some anxiety and PTSD. Seeing a counselor at [facility]. Feels this is helping. No thoughts of suicide or homicide. Seeing Jennifer Carranza in [place], [place] for anxiety and states that it is going well.     Drug screen appropriate in 8/2022. Stimulant agreement done in 10/2022. Will update both today.     Did take her Adderall this morning.     She does have heavy menses. Occur every 28-30 days. Last for 6 days. Has to change a super pad every 3 hours. Menses heavy for 3 years. Also has intermittent pelvic pain. Mother has endometriosis.  She has never had a pap. We did attempt this in the past and she did not tolerate. Was also seen by Nico Porras-pap attempted a second time and patient did not tolerate. Has never used tampons. She is not sexually active.     Review of Systems   Constitutional, HEENT, cardiovascular, pulmonary, gi and gu systems are negative, except as otherwise noted.      Objective    /76 (BP Location: Left arm, Patient Position: Sitting, Cuff Size: Adult Regular)   Pulse 108   Temp 98.5  F (36.9  C) (Tympanic)   Resp 16   Ht 1.6 m (5' 3\")   Wt 80.7 kg (178 lb)   SpO2 98%   BMI 31.53 kg/m    Body mass index is 31.53 kg/m .  Physical Exam   GENERAL: overweight, alert and no distress  EYES: Eyes grossly normal to inspection, PERRL and conjunctivae and sclerae normal  HENT: ear canals and TM's normal, nose and mouth without ulcers or lesions  NECK: no adenopathy, no asymmetry, masses, or scars and thyroid normal to palpation  RESP: lungs clear to auscultation - no rales, rhonchi or wheezes  CV: regular rate and " rhythm, no murmur, no peripheral edema and peripheral pulses strong  Exam:  Gastrointestinal: Abdomen soft, non-tender. BS normal. No masses, organomegaly  NEURO: Normal strength and tone, mentation intact and speech normal  PSYCH: mentation appears normal, affect normal/bright        EKG-VR 94, NSR without acute changes      Answers submitted by the patient for this visit:  Patient Health Questionnaire (Submitted on 8/21/2023)  If you checked off any problems, how difficult have these problems made it for you to do your work, take care of things at home, or get along with other people?: Somewhat difficult  PHQ9 TOTAL SCORE: 8  ABBY-7 (Submitted on 8/21/2023)  ABBY 7 TOTAL SCORE: 8

## 2023-08-16 ENCOUNTER — MYC REFILL (OUTPATIENT)
Dept: FAMILY MEDICINE | Facility: OTHER | Age: 23
End: 2023-08-16

## 2023-08-16 DIAGNOSIS — F90.9 ATTENTION DEFICIT HYPERACTIVITY DISORDER (ADHD), UNSPECIFIED ADHD TYPE: ICD-10-CM

## 2023-08-16 DIAGNOSIS — J30.2 SEASONAL ALLERGIC RHINITIS, UNSPECIFIED TRIGGER: ICD-10-CM

## 2023-08-16 NOTE — TELEPHONE ENCOUNTER
Adderall      Last Written Prescription Date:  7.14.23  Last Fill Quantity: #30,   # refills: 0  Last Office Visit: 6.28.23  Future Office visit:    Next 5 appointments (look out 90 days)      Aug 21, 2023 11:30 AM  (Arrive by 11:15 AM)  SHORT with Lissy Perez NP  [facility] [place] [facility] [address]  [place] [address]  [phone]             Routing refill request to provider for review/approval because:  Drug not on the [facility], [facility] or [facility] refill protocol or controlled substance

## 2023-08-17 RX ORDER — CETIRIZINE HYDROCHLORIDE 10 MG/1
10 TABLET ORAL DAILY
Qty: 30 TABLET | Refills: 9 | Status: SHIPPED | OUTPATIENT
Start: 2023-08-17 | End: 2023-09-26

## 2023-08-17 RX ORDER — DEXTROAMPHETAMINE SACCHARATE, AMPHETAMINE ASPARTATE MONOHYDRATE, DEXTROAMPHETAMINE SULFATE AND AMPHETAMINE SULFATE 3.75; 3.75; 3.75; 3.75 MG/1; MG/1; MG/1; MG/1
15 CAPSULE, EXTENDED RELEASE ORAL DAILY
Qty: 30 CAPSULE | Refills: 0 | OUTPATIENT
Start: 2023-08-17

## 2023-08-18 RX ORDER — DEXTROAMPHETAMINE SACCHARATE, AMPHETAMINE ASPARTATE MONOHYDRATE, DEXTROAMPHETAMINE SULFATE AND AMPHETAMINE SULFATE 3.75; 3.75; 3.75; 3.75 MG/1; MG/1; MG/1; MG/1
15 CAPSULE, EXTENDED RELEASE ORAL DAILY
Qty: 30 CAPSULE | Refills: 0 | Status: SHIPPED | OUTPATIENT
Start: 2023-08-18 | End: 2023-08-21

## 2023-08-21 ENCOUNTER — OFFICE VISIT (OUTPATIENT)
Dept: FAMILY MEDICINE | Facility: OTHER | Age: 23
End: 2023-08-21
Attending: NURSE PRACTITIONER
Payer: COMMERCIAL

## 2023-08-21 VITALS
BODY MASS INDEX: 31.54 KG/M2 | HEIGHT: 63 IN | DIASTOLIC BLOOD PRESSURE: 76 MMHG | HEART RATE: 108 BPM | OXYGEN SATURATION: 98 % | RESPIRATION RATE: 16 BRPM | SYSTOLIC BLOOD PRESSURE: 114 MMHG | TEMPERATURE: 98.5 F | WEIGHT: 178 LBS

## 2023-08-21 DIAGNOSIS — F41.9 ANXIETY: ICD-10-CM

## 2023-08-21 DIAGNOSIS — F90.9 ATTENTION DEFICIT HYPERACTIVITY DISORDER (ADHD), UNSPECIFIED ADHD TYPE: ICD-10-CM

## 2023-08-21 DIAGNOSIS — N92.0 MENORRHAGIA WITH REGULAR CYCLE: ICD-10-CM

## 2023-08-21 DIAGNOSIS — R74.8 ELEVATED SERUM ALKALINE PHOSPHATASE LEVEL: ICD-10-CM

## 2023-08-21 DIAGNOSIS — R10.2 PELVIC PAIN IN FEMALE: ICD-10-CM

## 2023-08-21 DIAGNOSIS — D72.829 LEUKOCYTOSIS, UNSPECIFIED TYPE: ICD-10-CM

## 2023-08-21 DIAGNOSIS — F43.10 PTSD (POST-TRAUMATIC STRESS DISORDER): Primary | ICD-10-CM

## 2023-08-21 DIAGNOSIS — R00.0 TACHYCARDIA: ICD-10-CM

## 2023-08-21 LAB
ALBUMIN SERPL BCG-MCNC: 4.3 G/DL (ref 3.5–5.2)
ALP SERPL-CCNC: 145 U/L (ref 35–129)
ALT SERPL W P-5'-P-CCNC: 13 U/L (ref 0–70)
ANION GAP SERPL CALCULATED.3IONS-SCNC: 12 MMOL/L (ref 7–15)
AST SERPL W P-5'-P-CCNC: 23 U/L (ref 0–45)
BASOPHILS # BLD AUTO: 0.1 10E3/UL (ref 0–0.2)
BASOPHILS NFR BLD AUTO: 1 %
BILIRUB SERPL-MCNC: 0.8 MG/DL
BUN SERPL-MCNC: 9.8 MG/DL (ref 6–20)
CALCIUM SERPL-MCNC: 9.7 MG/DL (ref 8.6–10)
CHLORIDE SERPL-SCNC: 103 MMOL/L (ref 98–107)
CREAT SERPL-MCNC: 0.74 MG/DL (ref 0.51–1.17)
CREAT UR-MCNC: 239 MG/DL
DEPRECATED HCO3 PLAS-SCNC: 24 MMOL/L (ref 22–29)
EOSINOPHIL # BLD AUTO: 0.2 10E3/UL (ref 0–0.7)
EOSINOPHIL NFR BLD AUTO: 2 %
ERYTHROCYTE [DISTWIDTH] IN BLOOD BY AUTOMATED COUNT: 13.2 % (ref 10–15)
GFR SERPL CREATININE-BSD FRML MDRD: >90 ML/MIN/1.73M2
GLUCOSE SERPL-MCNC: 95 MG/DL (ref 70–99)
HCT VFR BLD AUTO: 44.7 % (ref 35–53)
HGB BLD-MCNC: 14.8 G/DL (ref 11.7–17.7)
IMM GRANULOCYTES # BLD: 0 10E3/UL
IMM GRANULOCYTES NFR BLD: 0 %
LYMPHOCYTES # BLD AUTO: 3.5 10E3/UL (ref 0.8–5.3)
LYMPHOCYTES NFR BLD AUTO: 28 %
MCH RBC QN AUTO: 28.8 PG (ref 26.5–33)
MCHC RBC AUTO-ENTMCNC: 33.1 G/DL (ref 31.5–36.5)
MCV RBC AUTO: 87 FL (ref 78–100)
MONOCYTES # BLD AUTO: 0.9 10E3/UL (ref 0–1.3)
MONOCYTES NFR BLD AUTO: 7 %
NEUTROPHILS # BLD AUTO: 7.8 10E3/UL (ref 1.6–8.3)
NEUTROPHILS NFR BLD AUTO: 62 %
NRBC # BLD AUTO: 0 10E3/UL
NRBC BLD AUTO-RTO: 0 /100
PLATELET # BLD AUTO: 456 10E3/UL (ref 150–450)
POTASSIUM SERPL-SCNC: 3.8 MMOL/L (ref 3.4–5.3)
PROT SERPL-MCNC: 7.7 G/DL (ref 6.4–8.3)
RBC # BLD AUTO: 5.14 10E6/UL (ref 3.8–5.9)
SODIUM SERPL-SCNC: 139 MMOL/L (ref 136–145)
TSH SERPL DL<=0.005 MIU/L-ACNC: 1.6 UIU/ML (ref 0.3–4.2)
WBC # BLD AUTO: 12.5 10E3/UL (ref 4–11)

## 2023-08-21 PROCEDURE — 85004 AUTOMATED DIFF WBC COUNT: CPT | Mod: ZL | Performed by: NURSE PRACTITIONER

## 2023-08-21 PROCEDURE — 82977 ASSAY OF GGT: CPT | Mod: ZL | Performed by: NURSE PRACTITIONER

## 2023-08-21 PROCEDURE — 36415 COLL VENOUS BLD VENIPUNCTURE: CPT | Mod: ZL | Performed by: NURSE PRACTITIONER

## 2023-08-21 PROCEDURE — 80363 OPIOIDS & OPIATE ANALOGS 3/4: CPT | Mod: ZL | Performed by: NURSE PRACTITIONER

## 2023-08-21 PROCEDURE — 84443 ASSAY THYROID STIM HORMONE: CPT | Mod: ZL | Performed by: NURSE PRACTITIONER

## 2023-08-21 PROCEDURE — 80353 DRUG SCREENING COCAINE: CPT | Mod: ZL | Performed by: NURSE PRACTITIONER

## 2023-08-21 PROCEDURE — 80346 BENZODIAZEPINES1-12: CPT | Mod: ZL | Performed by: NURSE PRACTITIONER

## 2023-08-21 PROCEDURE — 99214 OFFICE O/P EST MOD 30 MIN: CPT | Performed by: NURSE PRACTITIONER

## 2023-08-21 PROCEDURE — G0463 HOSPITAL OUTPT CLINIC VISIT: HCPCS

## 2023-08-21 PROCEDURE — 80053 COMPREHEN METABOLIC PANEL: CPT | Mod: ZL | Performed by: NURSE PRACTITIONER

## 2023-08-21 RX ORDER — DEXTROAMPHETAMINE SACCHARATE, AMPHETAMINE ASPARTATE MONOHYDRATE, DEXTROAMPHETAMINE SULFATE AND AMPHETAMINE SULFATE 3.75; 3.75; 3.75; 3.75 MG/1; MG/1; MG/1; MG/1
15 CAPSULE, EXTENDED RELEASE ORAL DAILY
Qty: 30 CAPSULE | Refills: 0 | Status: SHIPPED | OUTPATIENT
Start: 2023-09-15 | End: 2023-11-21

## 2023-08-21 RX ORDER — DEXTROAMPHETAMINE SACCHARATE, AMPHETAMINE ASPARTATE MONOHYDRATE, DEXTROAMPHETAMINE SULFATE AND AMPHETAMINE SULFATE 3.75; 3.75; 3.75; 3.75 MG/1; MG/1; MG/1; MG/1
15 CAPSULE, EXTENDED RELEASE ORAL DAILY
Qty: 30 CAPSULE | Refills: 0 | Status: SHIPPED | OUTPATIENT
Start: 2023-10-15 | End: 2023-09-26

## 2023-08-21 RX ORDER — DEXTROAMPHETAMINE SACCHARATE, AMPHETAMINE ASPARTATE MONOHYDRATE, DEXTROAMPHETAMINE SULFATE AND AMPHETAMINE SULFATE 3.75; 3.75; 3.75; 3.75 MG/1; MG/1; MG/1; MG/1
15 CAPSULE, EXTENDED RELEASE ORAL DAILY
Qty: 30 CAPSULE | Refills: 0 | Status: SHIPPED | OUTPATIENT
Start: 2023-11-15 | End: 2023-09-26

## 2023-08-21 ASSESSMENT — ANXIETY QUESTIONNAIRES
GAD7 TOTAL SCORE: 8
IF YOU CHECKED OFF ANY PROBLEMS ON THIS QUESTIONNAIRE, HOW DIFFICULT HAVE THESE PROBLEMS MADE IT FOR YOU TO DO YOUR WORK, TAKE CARE OF THINGS AT HOME, OR GET ALONG WITH OTHER PEOPLE: SOMEWHAT DIFFICULT
2. NOT BEING ABLE TO STOP OR CONTROL WORRYING: SEVERAL DAYS
5. BEING SO RESTLESS THAT IT IS HARD TO SIT STILL: SEVERAL DAYS
6. BECOMING EASILY ANNOYED OR IRRITABLE: MORE THAN HALF THE DAYS
1. FEELING NERVOUS, ANXIOUS, OR ON EDGE: SEVERAL DAYS
7. FEELING AFRAID AS IF SOMETHING AWFUL MIGHT HAPPEN: SEVERAL DAYS
3. WORRYING TOO MUCH ABOUT DIFFERENT THINGS: SEVERAL DAYS
GAD7 TOTAL SCORE: 8
4. TROUBLE RELAXING: SEVERAL DAYS

## 2023-08-21 ASSESSMENT — PATIENT HEALTH QUESTIONNAIRE - PHQ9
10. IF YOU CHECKED OFF ANY PROBLEMS, HOW DIFFICULT HAVE THESE PROBLEMS MADE IT FOR YOU TO DO YOUR WORK, TAKE CARE OF THINGS AT HOME, OR GET ALONG WITH OTHER PEOPLE: SOMEWHAT DIFFICULT
SUM OF ALL RESPONSES TO PHQ QUESTIONS 1-9: 8
SUM OF ALL RESPONSES TO PHQ QUESTIONS 1-9: 8

## 2023-08-21 ASSESSMENT — PAIN SCALES - GENERAL: PAINLEVEL: NO PAIN (0)

## 2023-08-21 NOTE — LETTER
[facility]  08/21/23  Patient: Radha Mayfield  YOB: 2000  Medical Record Number: [id number]                                                                                  Non-Opioid Controlled Substance Agreement    This is an agreement between you and your provider regarding safe and appropriate use of controlled substances prescribed by your care team. Controlled substances are?medicines that can cause physical and mental dependence (abuse).     There are strict laws about having and using these medicines. We here at [facility] are  committed to working with you in your efforts to get better. To support you in this work, we'll help you schedule regular office appointments for medicine refills. If we must cancel or change your appointment for any reason, we'll make sure you have enough medicine to last until your next appointment.     As a Provider, I will:   Listen carefully to your concerns while treating you with respect.   Recommend a treatment plan that I believe is in your best interest and may involve therapies other than medicine.    Talk with you often about the possible benefits and the risk of harm of any medicine that we prescribe for you.  Assess the safety of this medicine and check how well it works.    Provide a plan on how to taper (discontinue or go off) using this medicine if the decision is made to stop its use.      ::  As a Patient, I understand controlled substances:     Are prescribed by my care provider to help me function or work and manage my condition(s).?  Are strong medicines and can cause serious side effects.     Need to be taken exactly as prescribed.?Combining controlled substances with certain medicines or chemicals (such as illegal drugs, alcohol, sedatives, sleeping pills, and benzodiazepines) can be dangerous or even fatal.? If I stop taking my medicines suddenly, I may have severe withdrawal symptoms.     The risks, benefits, and side  effects of these medicine(s) were explained to me. I agree that:    I will take part in other treatments as advised by my care team. This may be psychiatry or counseling, physical therapy, behavioral therapy, group treatment or a referral to specialist.    I will keep all my appointments and understand this is part of the monitoring of controlled substances.?My care team may require an office visit for EVERY controlled substance refill. If I miss appointments or don t follow instructions, my care team may stop my medicine    I will take my medicines as prescribed. I will not change the dose or schedule unless my care team tells me to. There will be no refills if I run out early.      I may be asked to come to the clinic and complete a urine drug test or complete a pill count. If I don t give a urine sample or participate in a pill count, the care team may stop my medicine.    I will only receive controlled substance prescriptions from this clinic. If I am treated by another provider, I will tell them that I am taking controlled substances and that I have a treatment agreement with this provider. I will inform my [facility] care team within one business day if I am given a prescription for any controlled substance by another healthcare provider. My [facility] care team can contact other providers and pharmacists about my use of any medicines.    It is up to me to make sure that I don't run out of my medicines on weekends or holidays.?If my care team is willing to refill my prescription without a visit, I must request refills only during office hours. Refills may take up to 3 business days to process. I will use one pharmacy to fill all my controlled substance prescriptions. I will notify the clinic about any changes to my insurance or medicine availability.    I am responsible for my prescriptions. If the medicine/prescription is lost, stolen or destroyed, it will not be replaced.?I also agree not to  share controlled substance medicines with anyone.     I am aware I should not use any illegal or recreational drugs. I agree not to drink alcohol unless my care team says I can.     If I enroll in the [place] Medical Cannabis program, I will tell my care team before my next refill.    I will tell my care team right away if I become pregnant, have a new medical problem treated outside of my regular clinic, or have a change in my medicines.     I understand that this medicine can affect my thinking, judgment and reaction time.? Alcohol and drugs affect the brain and body, which can affect the safety of my driving. Being under the influence of alcohol or drugs can affect my decision-making, behaviors, personal safety and the safety of others. Driving while impaired (DWI) can occur if a person is driving, operating or in physical control of a car, motorcycle, boat, snowmobile, ATV, motorbike, off-road vehicle or any other motor vehicle ([place] Statute 169A.20). I understand the risk if I choose to drive or operate any vehicle or machinery.    I understand that if I do not follow any of the conditions above, my prescriptions or treatment may be stopped or changed.   I agree that my provider, clinic care team and pharmacy may work with any city, state or federal law enforcement agency that investigates the misuse, sale or other diversion of my controlled medicine. I will allow my provider to discuss my care with, or share a copy of, this agreement with any other treating provider, pharmacy or emergency room where I receive care.     I have read this agreement and have asked questions about anything I did not understand.    ________________________________________________________  Patient Signature - Radha Mayfield     ___________________                   Date     ________________________________________________________  Provider Signature - Lissy Perez, NP       ___________________                   Date      ________________________________________________________  Witness Signature (required if provider not present while patient signing)          ___________________                   Date

## 2023-08-22 ENCOUNTER — LAB (OUTPATIENT)
Dept: LAB | Facility: OTHER | Age: 23
End: 2023-08-22
Payer: COMMERCIAL

## 2023-08-22 DIAGNOSIS — D72.829 LEUKOCYTOSIS, UNSPECIFIED TYPE: ICD-10-CM

## 2023-08-22 DIAGNOSIS — F43.10 PTSD (POST-TRAUMATIC STRESS DISORDER): ICD-10-CM

## 2023-08-22 LAB
BASOPHILS # BLD AUTO: 0.1 10E3/UL (ref 0–0.2)
BASOPHILS NFR BLD AUTO: 1 %
EOSINOPHIL # BLD AUTO: 0.3 10E3/UL (ref 0–0.7)
EOSINOPHIL NFR BLD AUTO: 2 %
ERYTHROCYTE [DISTWIDTH] IN BLOOD BY AUTOMATED COUNT: 13.2 % (ref 10–15)
GGT SERPL-CCNC: 16 U/L
HCT VFR BLD AUTO: 43.2 % (ref 35–53)
HGB BLD-MCNC: 14.3 G/DL (ref 11.7–17.7)
IMM GRANULOCYTES # BLD: 0 10E3/UL
IMM GRANULOCYTES NFR BLD: 0 %
LYMPHOCYTES # BLD AUTO: 3.7 10E3/UL (ref 0.8–5.3)
LYMPHOCYTES NFR BLD AUTO: 34 %
MCH RBC QN AUTO: 28.8 PG (ref 26.5–33)
MCHC RBC AUTO-ENTMCNC: 33.1 G/DL (ref 31.5–36.5)
MCV RBC AUTO: 87 FL (ref 78–100)
MONOCYTES # BLD AUTO: 0.8 10E3/UL (ref 0–1.3)
MONOCYTES NFR BLD AUTO: 7 %
NEUTROPHILS # BLD AUTO: 6.3 10E3/UL (ref 1.6–8.3)
NEUTROPHILS NFR BLD AUTO: 56 %
NRBC # BLD AUTO: 0 10E3/UL
NRBC BLD AUTO-RTO: 0 /100
PLATELET # BLD AUTO: 431 10E3/UL (ref 150–450)
RBC # BLD AUTO: 4.97 10E6/UL (ref 3.8–5.9)
RETICS # AUTO: 0.08 10E6/UL (ref 0.03–0.1)
RETICS/RBC NFR AUTO: 1.6 % (ref 0.5–2)
WBC # BLD AUTO: 11.1 10E3/UL (ref 4–11)

## 2023-08-22 PROCEDURE — 84075 ASSAY ALKALINE PHOSPHATASE: CPT | Mod: ZL

## 2023-08-22 PROCEDURE — 36415 COLL VENOUS BLD VENIPUNCTURE: CPT | Mod: ZL

## 2023-08-22 PROCEDURE — 85045 AUTOMATED RETICULOCYTE COUNT: CPT | Mod: ZL

## 2023-08-22 PROCEDURE — 85060 BLOOD SMEAR INTERPRETATION: CPT | Performed by: PATHOLOGY

## 2023-08-22 PROCEDURE — 85018 HEMOGLOBIN: CPT | Mod: ZL

## 2023-08-24 LAB
AMPHET UR CFM-MCNC: 7000 NG/ML
AMPHET/CREAT UR: 2929 NG/MG {CREAT}

## 2023-08-25 LAB
PATH REPORT.COMMENTS IMP SPEC: NORMAL
PATH REPORT.FINAL DX SPEC: NORMAL
PATH REPORT.MICROSCOPIC SPEC OTHER STN: NORMAL
PATH REPORT.MICROSCOPIC SPEC OTHER STN: NORMAL

## 2023-08-26 LAB
ALP BONE SERPL-CCNC: 53 U/L
ALP LIVER SERPL-CCNC: 80 U/L
ALP OTHER SERPL-CCNC: 0 U/L
ALP SERPL-CCNC: 133 U/L

## 2023-09-26 ENCOUNTER — OFFICE VISIT (OUTPATIENT)
Dept: OBGYN | Facility: OTHER | Age: 23
End: 2023-09-26
Attending: NURSE PRACTITIONER
Payer: COMMERCIAL

## 2023-09-26 VITALS
OXYGEN SATURATION: 98 % | DIASTOLIC BLOOD PRESSURE: 60 MMHG | HEART RATE: 123 BPM | SYSTOLIC BLOOD PRESSURE: 110 MMHG | HEIGHT: 63 IN | WEIGHT: 180 LBS | BODY MASS INDEX: 31.89 KG/M2

## 2023-09-26 DIAGNOSIS — N94.6 DYSMENORRHEA: ICD-10-CM

## 2023-09-26 DIAGNOSIS — N94.2 VAGINISMUS: Primary | ICD-10-CM

## 2023-09-26 LAB — HCG UR QL: NEGATIVE

## 2023-09-26 PROCEDURE — 81025 URINE PREGNANCY TEST: CPT | Mod: ZL | Performed by: NURSE PRACTITIONER

## 2023-09-26 PROCEDURE — 96372 THER/PROPH/DIAG INJ SC/IM: CPT | Performed by: NURSE PRACTITIONER

## 2023-09-26 PROCEDURE — G0463 HOSPITAL OUTPT CLINIC VISIT: HCPCS | Mod: 25

## 2023-09-26 PROCEDURE — 250N000011 HC RX IP 250 OP 636: Mod: JZ | Performed by: NURSE PRACTITIONER

## 2023-09-26 PROCEDURE — 99213 OFFICE O/P EST LOW 20 MIN: CPT | Performed by: NURSE PRACTITIONER

## 2023-09-26 RX ORDER — MEDROXYPROGESTERONE ACETATE 150 MG/ML
150 INJECTION, SUSPENSION INTRAMUSCULAR
Status: ACTIVE | OUTPATIENT
Start: 2023-09-26 | End: 2024-09-20

## 2023-09-26 RX ORDER — CETIRIZINE HYDROCHLORIDE 10 MG/1
10 TABLET ORAL DAILY
COMMUNITY
End: 2024-06-10

## 2023-09-26 RX ADMIN — MEDROXYPROGESTERONE ACETATE 150 MG: 150 INJECTION, SUSPENSION INTRAMUSCULAR at 14:40

## 2023-09-26 ASSESSMENT — PAIN SCALES - GENERAL: PAINLEVEL: NO PAIN (0)

## 2023-09-26 NOTE — PROGRESS NOTES
Clinic Administered Medication Documentation        Patient was given Depo Provera. Prior to medication administration, verified patient's identity using patient s name and date of birth. Please see MAR and medication order for additional information. Patient instructed to report any adverse reaction to staff immediately.    Vial/Syringe: Single dose vial. Was entire vial of medication used? Yes    NEXT INJECTION DUE: 12/12/23 - 1/9/24  Left Deltoid  IVANA GONZALEZ LPN

## 2023-09-27 NOTE — PROGRESS NOTES
"[facility]                HPI     Here today with her mother to discuss heavy periods and vaginal pain with attempted penetration.  Several exams were previously attempted with Nico Porras CNM, and unsuccessful.  aRdha reports that this has not improved at all.  She described any touching in the vaginal area as \"hot pokers\" and \"makes me cry\". Denies hx of sexual abuse.  Periods are heavy and occurring every 28-38 days.  She has a family hx of endometriosis and is worried that this is making her periods irregular and causing pain.  We have discussed  endometriosis vs dysmenorrhea, vs irregular menstrual cycles.  We have also discussed the risks and benefits of contraception in controlling her periods.  She would like to try Depo Provera. She is in a long term relationship with a transgender partner.       Strongly encouraged pelvic floor PT and the diagnosis of vaginismus.  She agrees that this would be helpful and we will hold off on a pap smear until after she has had a full evaluation.              Medications:     Current Outpatient Medications Ordered in Epic   Medication    amphetamine-dextroamphetamine (ADDERALL XR) 15 MG 24 hr capsule    cetirizine (ZYRTEC) 10 MG tablet    ibuprofen (ADVIL/MOTRIN) 800 MG tablet     Current Facility-Administered Medications Ordered in Epic   Medication Dose Route Frequency Last Rate Last Admin    medroxyPROGESTERone (DEPO-PROVERA) injection 150 mg  150 mg Intramuscular Q90 Days   150 mg at 09/26/23 1440                Allergies:   No clinical screening - see comments, Oxcarbazepine, and Chlorine         Review of Systems:     The 5 point Review of Systems is negative other than noted in the HPI                     Physical Exam:   Blood pressure 110/60, pulse (!) 123, height 1.6 m (5' 3\"), weight 81.6 kg (180 lb), SpO2 98 %.  Constitutional:   awake, alert, cooperative, no apparent distress, and appears stated age               Assessment and Plan: "     Dysmenorrhea - Depo Provera every 90 days as directed    Vaginismus - referral for pelvic floor PT.      20 minutes were spent on record review and teaching with this encounter.      Cassie Wilson NP, TRINH  9/27/2023  12:01 PM

## 2023-09-30 ENCOUNTER — IMMUNIZATION (OUTPATIENT)
Dept: FAMILY MEDICINE | Facility: OTHER | Age: 23
End: 2023-09-30
Attending: NURSE PRACTITIONER
Payer: COMMERCIAL

## 2023-09-30 PROCEDURE — 90686 IIV4 VACC NO PRSV 0.5 ML IM: CPT

## 2023-09-30 PROCEDURE — G0008 ADMIN INFLUENZA VIRUS VAC: HCPCS

## 2023-10-20 ENCOUNTER — THERAPY VISIT (OUTPATIENT)
Dept: PHYSICAL THERAPY | Facility: HOSPITAL | Age: 23
End: 2023-10-20
Attending: NURSE PRACTITIONER
Payer: COMMERCIAL

## 2023-10-20 DIAGNOSIS — N94.2 VAGINISMUS: ICD-10-CM

## 2023-10-20 DIAGNOSIS — M62.89 PELVIC FLOOR DYSFUNCTION IN FEMALE: Primary | ICD-10-CM

## 2023-10-20 PROCEDURE — 97161 PT EVAL LOW COMPLEX 20 MIN: CPT | Mod: GP

## 2023-10-20 PROCEDURE — 97530 THERAPEUTIC ACTIVITIES: CPT | Mod: GP

## 2023-10-20 NOTE — PROGRESS NOTES
PHYSICAL THERAPY EVALUATION  Type of Visit: Evaluation and treatment    See electronic medical record for Abuse and Falls Screening details.    Subjective       Presenting condition or subjective complaint: unable to have pelvic exams, penetrative sex  Date of onset: 23    Relevant medical history: Mental Illness; Depression . Absence seizures  Dates & types of surgery:  n/a    Prior diagnostic imaging/testing results:     n/a  Prior therapy history for the same diagnosis, illness or injury: No      Prior Level of Function  Transfers: Independent  Ambulation: Independent  ADL: Independent  IADL: currently looking for job.     Living Environment  Social support: With a significant other or spouse   Type of home:   apartment  Stairs to enter the home:         Ramp:     Stairs inside the home:         Help at home: None  Equipment owned:       Employment:   nonempoloyed  Hobbies/Interests:    Art, video games, reading, writing, walking  Patient goals for therapy: significant sharp burning pain    Pain assessment: Pain present  Location: vagina/Ratin/10  Location:  /Rating:       Objective      PELVIC EVALUATION  ADDITIONAL HISTORY:  Sex assigned at birth: Femalefemale   Gender identity: -- (asexual)    Pronouns: -- (she/he/they)      Bladder History:  Feels bladder filling: Yes  Triggers for feeling of inability to wait to go to the bathroom: No    How long can you wait to urinate: 1 hour  Gets up at night to urinate: No    Can stop the flow of urine when urinating: Yes  Volume of urine usually released: Medium   Other issues:    Number of bladder infections in last 12 months:    Fluid intake per day: 20   8  Medications taken for bladder: No     Activities causing urine leak:     n/a  Amount of urine typically leaked:    Pads used to help with leaking:          Bowel History:  Frequency of bowel movement: normal  Consistency of stool: Soft    Ignores the urge to defecate: No  Other bowel issues:    Length of  time spent trying to have a bowel movement:      Sexual Function History:  Sexual orientation: Asexual    Sexually active: Yes  Lubrication used: Yes Yes  Pelvic pain: Pelvic exams; Use of tampon; Initial penetration (rectal or vaginal); Deep penetration (rectal or vaginal)    Pain or difficulty with orgasms/erection/ejaculation: No    State of menopause:    Hormone medications: No      Are you currently pregnant: No, Do you get regular exercise: Yes, I do this type of exercise: walking, Have you tried pelvic floor strengthening exercises for 4 weeks: No, Do you have any history of trauma that is relevant to your care that you d like to share: No    Discussed reason for referral regarding pelvic health needs and external/internal pelvic floor muscle examination with patient/guardian.  Opportunity provided to ask questions and verbal consent for assessment and intervention was given.    PAIN: Pain Level at Rest: 0/10  Pain Level with Use: 8/10  Pain Quality: Burning and Sharp  Pain Frequency: intermittent  POSTURE: Sitting Posture: Rounded shoulders, Forward head, Thoracic kyphosis increased  LUMBAR SCREEN: AROM WFL  HIP SCREEN:  Strength:    Functional Strength Testing:     PELVIC/SI SCREEN:     PAIN PROVOCATION TEST:   PELVIS/SI SPECIAL TESTS:   BREATHING SYMMETRY:     PELVIC EXAM  External Visual Inspection:  At rest: Normal  With voluntary pelvic floor contraction: Present    Integumentary:   Introitus: Unremarkable    External Digital Palpation per Perineum:   Ischiocavernosis: Tightness  Bulbo cavernosis: Tightness  Transverse perineal: Unremarkable    Scar:   Location/Type: n/a  Mobility:     Internal Digital Palpation:  Per Vagina:  Tenderness  Tone: high  Pain 8/10 at introitus to light finger tip pressure. This went to 0/10 after deep breathing and distraction techniques. Pain 8/10 rating 1/8 finger tip placed in vag canal- unable to press or go further in d/t pain and guarding.   Per Rectum:        Pelvic  Organ Prolapse:       ABDOMINAL ASSESSMENT  Diastasis Rectus Abdominis (ELIZABETH):      Abdominal Activation/Strength:           Fascial Tension/Restriction/Tone:     BIOFEEDBACK:  Position:   Surface Electrodes:     Abdominals:     Perianals:       DERMATOMES:   DTR S:     Assessment & Plan   CLINICAL IMPRESSIONS  Medical Diagnosis: Vaginismus    Treatment Diagnosis: pain, PFM tightness   Impression/Assessment: Patient is a 23 year old adult with vaginal pain and tightness complaints.  The following significant findings have been identified: vaginisumus, Pain, Decreased ROM/flexibility, and Impaired muscle performance. These impairments interfere with their ability to perform recreational activities and vaginal exams  as compared to previous level of function.     Clinical Decision Making (Complexity):  Clinical Presentation: Evolving/Changing  Clinical Presentation Rationale: based on medical and personal factors listed in PT evaluation  Clinical Decision Making (Complexity): Low complexity    PLAN OF CARE  Treatment Interventions:  Interventions: Manual Therapy, Therapeutic Activity, Therapeutic Exercise    Long Term Goals     PT Goal 1  Goal Identifier: STG 1  Goal Description: Pt will demon indep HEP compliance  Target Date: 12/01/23  PT Goal 2  Goal Identifier: STG 2  Goal Description: Pt and partner will demonstrate indep dilator tool usage in clinic as observed by therapist to reduce vaginal pain to under 4/10  Target Date: 12/29/23  PT Goal 3  Goal Identifier: LTG  Goal Description: Pt will be able to tolerate pap smears using small speculum and have penetrative intercourse  Target Date: 01/12/24      Frequency of Treatment: 1x week  Duration of Treatment: 12 weeks        Education Assessment:   Learner/Method: Patient    Risks and benefits of evaluation/treatment have been explained.   Patient/Family/caregiver agrees with Plan of Care.     Evaluation Time:     PT Eval, Low Complexity Minutes (16919): 35        Signing Clinician: Natasha Kirk, PT      [facility]                                                                                   OUTPATIENT PHYSICAL THERAPY      PLAN OF TREATMENT FOR OUTPATIENT REHABILITATION   Patient's Last Name, First Name, M.IMaureen  Radha Mayfield YOB: 2000   Provider's Name   [facility]   Medical Record No.  [id number]     Onset Date: 09/26/23  Start of Care Date: 10/20/23     Medical Diagnosis:  Vaginismus      PT Treatment Diagnosis:  pain, PFM tightness Plan of Treatment  Frequency/Duration: 1x week/ 12 weeks    Certification date from 10/20/23 to 01/12/24         See note for plan of treatment details and functional goals     Natasha Kirk, PT                         I CERTIFY THE NEED FOR THESE SERVICES FURNISHED UNDER        THIS PLAN OF TREATMENT AND WHILE UNDER MY CARE     (Physician attestation of this document indicates review and certification of the therapy plan).                Referring Provider:  Cassie Wilson      Initial Assessment  See Epic Evaluation- Start of Care Date: 10/20/23

## 2023-10-27 ENCOUNTER — THERAPY VISIT (OUTPATIENT)
Dept: PHYSICAL THERAPY | Facility: HOSPITAL | Age: 23
End: 2023-10-27
Attending: NURSE PRACTITIONER
Payer: COMMERCIAL

## 2023-10-27 DIAGNOSIS — N94.2 VAGINISMUS: Primary | ICD-10-CM

## 2023-10-27 PROCEDURE — 97140 MANUAL THERAPY 1/> REGIONS: CPT | Mod: GP

## 2023-10-27 PROCEDURE — 97110 THERAPEUTIC EXERCISES: CPT | Mod: GP

## 2023-10-27 PROCEDURE — 97530 THERAPEUTIC ACTIVITIES: CPT | Mod: GP

## 2023-11-03 ENCOUNTER — THERAPY VISIT (OUTPATIENT)
Dept: PHYSICAL THERAPY | Facility: HOSPITAL | Age: 23
End: 2023-11-03
Attending: NURSE PRACTITIONER
Payer: COMMERCIAL

## 2023-11-03 DIAGNOSIS — N94.2 VAGINISMUS: Primary | ICD-10-CM

## 2023-11-03 DIAGNOSIS — M62.89 PELVIC FLOOR DYSFUNCTION IN FEMALE: ICD-10-CM

## 2023-11-03 PROCEDURE — 97110 THERAPEUTIC EXERCISES: CPT | Mod: GP

## 2023-11-03 PROCEDURE — 97140 MANUAL THERAPY 1/> REGIONS: CPT | Mod: GP

## 2023-11-17 ENCOUNTER — THERAPY VISIT (OUTPATIENT)
Dept: PHYSICAL THERAPY | Facility: HOSPITAL | Age: 23
End: 2023-11-17
Attending: NURSE PRACTITIONER
Payer: COMMERCIAL

## 2023-11-17 DIAGNOSIS — M62.89 PELVIC FLOOR DYSFUNCTION IN FEMALE: ICD-10-CM

## 2023-11-17 DIAGNOSIS — N94.2 VAGINISMUS: Primary | ICD-10-CM

## 2023-11-17 PROCEDURE — 97110 THERAPEUTIC EXERCISES: CPT | Mod: GP

## 2023-11-17 PROCEDURE — 97140 MANUAL THERAPY 1/> REGIONS: CPT | Mod: GP

## 2023-11-19 NOTE — PROGRESS NOTES
Assessment & Plan     Attention deficit hyperactivity disorder (ADHD), unspecified ADHD type  Pt shows no signs of diversion or abuse. Up to 3 month of his/her ADD/ADHD medications given today. Pt is aware that he/she is solely responsible for protections of the prescriptions. I will not tolerate any lost or stolen prescriptions lightly. Will see pt back in office in 3 months. Stimulant agreement up to date.       PTSD (post-traumatic stress disorder)  Anxiety  Controlled with counseling. She plans to continue.     Nasal bleeding  Patient with frequent nose bleeds over the past 4 weeks. Currently does not have a nose bleed. She has needed cauterization in the past and was told she has scarring in her nose and this may occur again. Will check labs to be sure her hgb/inr look ok, but also send to ENT for possible cauterization.     In the meantime, if the bleeding reoccurs and she can't get it to stop, she will go to the ER.       - CBC with platelets and differential; Future  - Reflex INR; Future  - Comprehensive metabolic panel (BMP + Alb, Alk Phos, ALT, AST, Total. Bili, TP); Future  - Adult ENT  Referral; Future        Lissy Perez NP  [facility] - MOISES Garcia is a 23 year old, presenting for the following health issues:  A.D.H.D      HPI     ADHD:   Medication Followup of  Adderall XR 15 mg once daily  Taking Medication as prescribed: yes, no concerns   Side Effects:  None; no chest pain, shortness of breath, dizziness, syncope, insomnia, or palpitations; weight stable   Medication Helping Symptoms:  yes, able to stay on task and focus well.   -taking a break from school  -was working at [employer], but was let go several months ago     -She does have some anxiety and PTSD. Seeing a counselor at [facility]. Feels this is helping. No thoughts of suicide or homicide. Seeing Jennifer Carranza in [place], [place] for anxiety and states that it is going well.      Drug screen  "appropriate in 8/2023. Stimulant agreement done in 8/2023.       She is also having frequent nose bleeds. In the past month, she has had 6 nose bleeds from the right nostril. This has occurred in the past and she needed to see ENT for cauterization. Was told that she has scarring in her right nostril. Typically takes 15 minutes to stop. Teeth do not bleed when she brushes them. Does not pick her nose. She tried using a humidifier and it did not help.     Review of Systems   Constitutional, HEENT, cardiovascular, pulmonary, gi and gu systems are negative, except as otherwise noted.      Objective    /72   Pulse 111   Temp 98.4  F (36.9  C) (Tympanic)   Resp 16   Ht 1.6 m (5' 3\")   Wt 82.4 kg (181 lb 11.2 oz)   SpO2 97%   BMI 32.19 kg/m    Body mass index is 32.19 kg/m .  Physical Exam   GENERAL: healthy, alert and no distress  EYES: Eyes grossly normal to inspection, PERRL and conjunctivae and sclerae normal  HENT: ear canals and TM's normal, nose and mouth without ulcers or lesions  NECK: no adenopathy, no asymmetry, masses, or scars and thyroid normal to palpation  RESP: lungs clear to auscultation - no rales, rhonchi or wheezes  CV: regular rhythm, mild tachycardia, no murmur, click or rub, no peripheral edema  MS: no gross musculoskeletal defects noted, no edema  NEURO: Normal strength and tone, mentation intact and speech normal  PSYCH: mentation appears normal, affect normal/bright    Labs in process.                "

## 2023-11-21 ENCOUNTER — OFFICE VISIT (OUTPATIENT)
Dept: FAMILY MEDICINE | Facility: OTHER | Age: 23
End: 2023-11-21
Attending: NURSE PRACTITIONER
Payer: COMMERCIAL

## 2023-11-21 VITALS
DIASTOLIC BLOOD PRESSURE: 72 MMHG | TEMPERATURE: 98.4 F | OXYGEN SATURATION: 97 % | RESPIRATION RATE: 16 BRPM | HEART RATE: 100 BPM | WEIGHT: 181.7 LBS | BODY MASS INDEX: 32.2 KG/M2 | SYSTOLIC BLOOD PRESSURE: 100 MMHG | HEIGHT: 63 IN

## 2023-11-21 DIAGNOSIS — F41.9 ANXIETY: ICD-10-CM

## 2023-11-21 DIAGNOSIS — F90.9 ATTENTION DEFICIT HYPERACTIVITY DISORDER (ADHD), UNSPECIFIED ADHD TYPE: Primary | ICD-10-CM

## 2023-11-21 DIAGNOSIS — R04.0 NASAL BLEEDING: ICD-10-CM

## 2023-11-21 DIAGNOSIS — F43.10 PTSD (POST-TRAUMATIC STRESS DISORDER): ICD-10-CM

## 2023-11-21 LAB
ALBUMIN SERPL BCG-MCNC: 4.5 G/DL (ref 3.5–5.2)
ALP SERPL-CCNC: 124 U/L (ref 40–150)
ALT SERPL W P-5'-P-CCNC: 13 U/L (ref 0–70)
ANION GAP SERPL CALCULATED.3IONS-SCNC: 10 MMOL/L (ref 7–15)
AST SERPL W P-5'-P-CCNC: 20 U/L (ref 0–45)
BASOPHILS # BLD AUTO: 0.1 10E3/UL (ref 0–0.2)
BASOPHILS NFR BLD AUTO: 1 %
BILIRUB SERPL-MCNC: 0.8 MG/DL
BUN SERPL-MCNC: 8 MG/DL (ref 6–20)
CALCIUM SERPL-MCNC: 9.5 MG/DL (ref 8.6–10)
CHLORIDE SERPL-SCNC: 105 MMOL/L (ref 98–107)
CREAT SERPL-MCNC: 0.85 MG/DL (ref 0.51–1.17)
DEPRECATED HCO3 PLAS-SCNC: 24 MMOL/L (ref 22–29)
EGFRCR SERPLBLD CKD-EPI 2021: >90 ML/MIN/1.73M2
EOSINOPHIL # BLD AUTO: 0.2 10E3/UL (ref 0–0.7)
EOSINOPHIL NFR BLD AUTO: 2 %
ERYTHROCYTE [DISTWIDTH] IN BLOOD BY AUTOMATED COUNT: 12.9 % (ref 10–15)
GLUCOSE SERPL-MCNC: 97 MG/DL (ref 70–99)
HCT VFR BLD AUTO: 43.2 % (ref 35–53)
HGB BLD-MCNC: 14.4 G/DL (ref 11.7–17.7)
IMM GRANULOCYTES # BLD: 0 10E3/UL
IMM GRANULOCYTES NFR BLD: 0 %
INR PPP: 1.07 (ref 0.85–1.15)
LYMPHOCYTES # BLD AUTO: 2.8 10E3/UL (ref 0.8–5.3)
LYMPHOCYTES NFR BLD AUTO: 34 %
MCH RBC QN AUTO: 28.3 PG (ref 26.5–33)
MCHC RBC AUTO-ENTMCNC: 33.3 G/DL (ref 31.5–36.5)
MCV RBC AUTO: 85 FL (ref 78–100)
MONOCYTES # BLD AUTO: 0.6 10E3/UL (ref 0–1.3)
MONOCYTES NFR BLD AUTO: 7 %
NEUTROPHILS # BLD AUTO: 4.6 10E3/UL (ref 1.6–8.3)
NEUTROPHILS NFR BLD AUTO: 56 %
NRBC # BLD AUTO: 0 10E3/UL
NRBC BLD AUTO-RTO: 0 /100
PLATELET # BLD AUTO: 436 10E3/UL (ref 150–450)
POTASSIUM SERPL-SCNC: 4.2 MMOL/L (ref 3.4–5.3)
PROT SERPL-MCNC: 7.8 G/DL (ref 6.4–8.3)
RBC # BLD AUTO: 5.08 10E6/UL (ref 3.8–5.9)
SODIUM SERPL-SCNC: 139 MMOL/L (ref 135–145)
WBC # BLD AUTO: 8.2 10E3/UL (ref 4–11)

## 2023-11-21 PROCEDURE — 99214 OFFICE O/P EST MOD 30 MIN: CPT | Performed by: NURSE PRACTITIONER

## 2023-11-21 PROCEDURE — 85610 PROTHROMBIN TIME: CPT | Mod: ZL | Performed by: NURSE PRACTITIONER

## 2023-11-21 PROCEDURE — 85025 COMPLETE CBC W/AUTO DIFF WBC: CPT | Mod: ZL | Performed by: NURSE PRACTITIONER

## 2023-11-21 PROCEDURE — 80053 COMPREHEN METABOLIC PANEL: CPT | Mod: ZL | Performed by: NURSE PRACTITIONER

## 2023-11-21 PROCEDURE — 36415 COLL VENOUS BLD VENIPUNCTURE: CPT | Mod: ZL | Performed by: NURSE PRACTITIONER

## 2023-11-21 PROCEDURE — G0463 HOSPITAL OUTPT CLINIC VISIT: HCPCS

## 2023-11-21 RX ORDER — DEXTROAMPHETAMINE SACCHARATE, AMPHETAMINE ASPARTATE MONOHYDRATE, DEXTROAMPHETAMINE SULFATE AND AMPHETAMINE SULFATE 3.75; 3.75; 3.75; 3.75 MG/1; MG/1; MG/1; MG/1
15 CAPSULE, EXTENDED RELEASE ORAL DAILY
Qty: 30 CAPSULE | Refills: 0 | Status: SHIPPED | OUTPATIENT
Start: 2024-01-12 | End: 2024-05-28

## 2023-11-21 RX ORDER — DEXTROAMPHETAMINE SACCHARATE, AMPHETAMINE ASPARTATE MONOHYDRATE, DEXTROAMPHETAMINE SULFATE AND AMPHETAMINE SULFATE 3.75; 3.75; 3.75; 3.75 MG/1; MG/1; MG/1; MG/1
15 CAPSULE, EXTENDED RELEASE ORAL DAILY
Qty: 30 CAPSULE | Refills: 0 | Status: SHIPPED | OUTPATIENT
Start: 2023-12-13 | End: 2024-02-26

## 2023-11-21 RX ORDER — DEXTROAMPHETAMINE SACCHARATE, AMPHETAMINE ASPARTATE MONOHYDRATE, DEXTROAMPHETAMINE SULFATE AND AMPHETAMINE SULFATE 3.75; 3.75; 3.75; 3.75 MG/1; MG/1; MG/1; MG/1
15 CAPSULE, EXTENDED RELEASE ORAL DAILY
Qty: 30 CAPSULE | Refills: 0 | Status: SHIPPED | OUTPATIENT
Start: 2024-02-14 | End: 2024-05-28

## 2023-11-21 ASSESSMENT — PAIN SCALES - GENERAL: PAINLEVEL: NO PAIN (0)

## 2023-11-21 NOTE — COMMUNITY RESOURCES LIST (ENGLISH)
11/21/2023   [facility]  N/A  For questions about this resource list or additional care needs, please contact your primary care clinic or care manager.  Phone: [phone]   Email: N/A   Address: [address] [place] [id number]   Hours: N/A        Food and Nutrition       Food pantry  1  [facility] [place] Distance: 0.52 miles      Pickup   [address] [place] [address]  Language: English  Hours: Mon 9:00 AM - 11:00 AM , Tue 1:00 PM - 3:00 PM , Wed - Thu 9:00 AM - 11:00 AM  Fees: Free, Self Pay   Phone: (256) 887-7518 Email: [email or web address] Website: [email or web address]/[facility]/[place]     2  [facility] [employer] [facility] Free [facility] Distance: 19.78 miles      Pickup   [address] [place] [address]  Language: English  Hours: Mon - Sun Open 24 Hours  Fees: Free   Phone: (382) 472-2668 Email: yuni@[employer].org Website: http://www.[employer].org     SNAP application assistance  3  [facility]  Economic Services & [facility] Distance: 0.61 miles      In-Person, Phone/Virtual   [address] [place] [address]  Language: English  Hours: Mon - Fri 8:00 AM - 4:30 PM  Fees: Free   Phone: (472) 782-4648 Website: https://www.[facility].gov/nnfvajgrbmq-l-k/public-health-human-services/economic-services-supports     4  [facility] - Economic Services & [facility] Distance: 19.86 miles      In-Person, Phone/Virtual   [address] [place] [address]  Language: English  Hours: Mon - Fri 8:00 AM - 4:30 PM  Fees: Free   Phone: (139) 485-3181 Email: financialassistance@[facility].gov Website: https://[place].gov/horhkpgwpuh-q-c/public-health-human-services/economic-services-supports     Soup kitchen or free meals  5  [facility] [place]  [facility] Distance: 0.52 miles      Pickup   [address] [place] [address]  Language: English  Hours: Mon - Fri 4:00 PM - 4:45 PM  Fees: Free   Phone: (786) 110-5619 Email: jacquie@[facility]\""[employer] Website: [email or web address]://[place].[facility].org/northern/[place]          Important Numbers & Websites       Emergency Services   911  [facility] Services   311  Poison Control   (494) 417-9228  Suicide Prevention Lifeline   (865) 933-9833 (TALK)  Child Abuse Hotline   (417) 193-1110 (4-A-Child)  Sexual Assault Hotline   (975) 314-4459 (HOPE)  National Runaway Safeline   (806) 862-3624 (RUNAWAY)  All-Options Talkline   (480) 658-6197  Substance Abuse Referral   (829) 481-4075 (HELP)

## 2023-11-30 ENCOUNTER — THERAPY VISIT (OUTPATIENT)
Dept: PHYSICAL THERAPY | Facility: HOSPITAL | Age: 23
End: 2023-11-30
Attending: NURSE PRACTITIONER
Payer: COMMERCIAL

## 2023-11-30 DIAGNOSIS — N94.2 VAGINISMUS: Primary | ICD-10-CM

## 2023-11-30 PROCEDURE — 97530 THERAPEUTIC ACTIVITIES: CPT | Mod: GP

## 2023-11-30 PROCEDURE — 97140 MANUAL THERAPY 1/> REGIONS: CPT | Mod: GP

## 2023-12-14 ENCOUNTER — THERAPY VISIT (OUTPATIENT)
Dept: PHYSICAL THERAPY | Facility: HOSPITAL | Age: 23
End: 2023-12-14
Attending: NURSE PRACTITIONER
Payer: COMMERCIAL

## 2023-12-14 DIAGNOSIS — N94.2 VAGINISMUS: Primary | ICD-10-CM

## 2023-12-14 PROCEDURE — 97530 THERAPEUTIC ACTIVITIES: CPT | Mod: GP

## 2023-12-14 PROCEDURE — 97110 THERAPEUTIC EXERCISES: CPT | Mod: GP

## 2023-12-14 PROCEDURE — 97140 MANUAL THERAPY 1/> REGIONS: CPT | Mod: GP

## 2023-12-20 ENCOUNTER — ALLIED HEALTH/NURSE VISIT (OUTPATIENT)
Dept: ALLERGY | Facility: OTHER | Age: 23
End: 2023-12-20
Attending: NURSE PRACTITIONER
Payer: COMMERCIAL

## 2023-12-20 ENCOUNTER — OFFICE VISIT (OUTPATIENT)
Dept: OTOLARYNGOLOGY | Facility: OTHER | Age: 23
End: 2023-12-20
Attending: NURSE PRACTITIONER
Payer: COMMERCIAL

## 2023-12-20 VITALS
OXYGEN SATURATION: 96 % | BODY MASS INDEX: 32.19 KG/M2 | TEMPERATURE: 96.4 F | SYSTOLIC BLOOD PRESSURE: 121 MMHG | DIASTOLIC BLOOD PRESSURE: 84 MMHG | HEIGHT: 63 IN | WEIGHT: 181.66 LBS | HEART RATE: 128 BPM

## 2023-12-20 DIAGNOSIS — R04.0 NASAL BLEEDING: ICD-10-CM

## 2023-12-20 DIAGNOSIS — Z30.42 ENCOUNTER FOR SURVEILLANCE OF INJECTABLE CONTRACEPTIVE: Primary | ICD-10-CM

## 2023-12-20 DIAGNOSIS — Z87.898 H/O EPISTAXIS: Primary | ICD-10-CM

## 2023-12-20 PROCEDURE — 250N000011 HC RX IP 250 OP 636: Mod: JZ | Performed by: NURSE PRACTITIONER

## 2023-12-20 PROCEDURE — 31231 NASAL ENDOSCOPY DX: CPT | Performed by: PHYSICIAN ASSISTANT

## 2023-12-20 PROCEDURE — G0463 HOSPITAL OUTPT CLINIC VISIT: HCPCS | Mod: 25

## 2023-12-20 PROCEDURE — 96372 THER/PROPH/DIAG INJ SC/IM: CPT | Mod: XU | Performed by: NURSE PRACTITIONER

## 2023-12-20 PROCEDURE — 99213 OFFICE O/P EST LOW 20 MIN: CPT | Mod: 25 | Performed by: PHYSICIAN ASSISTANT

## 2023-12-20 RX ORDER — SODIUM CHLORIDE/ALOE VERA
GEL (GRAM) NASAL
Qty: 14.1 G | Refills: 1 | Status: SHIPPED | OUTPATIENT
Start: 2023-12-20 | End: 2024-09-04

## 2023-12-20 RX ORDER — MEDROXYPROGESTERONE ACETATE 150 MG/ML
150 INJECTION, SUSPENSION INTRAMUSCULAR
Status: ACTIVE | OUTPATIENT
Start: 2023-12-20

## 2023-12-20 RX ORDER — ECHINACEA PURPUREA EXTRACT 125 MG
TABLET ORAL
Qty: 480 ML | Refills: 3 | Status: SHIPPED | OUTPATIENT
Start: 2023-12-20 | End: 2024-09-04

## 2023-12-20 RX ADMIN — MEDROXYPROGESTERONE ACETATE 150 MG: 150 INJECTION, SUSPENSION INTRAMUSCULAR at 09:15

## 2023-12-20 NOTE — PATIENT INSTRUCTIONS
Continue with Zyrtec.   Start Nasal saline and Nasal ayr gel.   Follow up in 6 weeks, if no improvement would recommend cautery.       Thank you for allowing Nitza Schwartz PA-C and our ENT team to participate in your care.  If your medications are too expensive, please give the nurse a call.  We can possibly change this medication.  If you have a scheduling or an appointment question please contact our Health Unit Coordinator at [phone], Ext. [phone].    ALL nursing questions or concerns can be directed to your ENT nurse at: [phone] Natalie      Epistaxis (nose bleed) Instructions    With heavy bleeding, start irrigating with very warm Som Med saline irrigation or any other warm saline.  Repeatedly irrigate with warm saline until heavy bleeding stops or improves.     Next gently blow nose and repeat irrigation.  Once the bleeding has slowed down, clamp nose for 5 minutes    Next apply Afrin (oxymetazoline) spray.  2 sprays to affected nostril.  Repeat after 5 minutes.    Next reapply clamp for 30 minutes.    Gargle and spit with warm saline to remove any clots or blood from your throat.    Lie down with head elevated to 45 degrees if possible for at least 15-30 minutes.    Continue to use Afrin nasal spray, 2 sprays twice a day to the nose for 4 days then stop.  Do not use Afrin long term.    Apply Ayr gel, bacitracin or bactroban antibiotic ointment to the nose twice a day and before bed.  Continue this for 4 days then start daily moisture instructions below.    If your bleeding cannot be controlled go to the closest Emergency room.    You may continue to have some occasional oozing from the nose but the goal is to control the heavy bleeding.    You may also try over the counter NasalCEASE which is a safe packing that you can insert yourself at home.    Daily Nasal Moisture Instructions    Keep your nose moist  Use ocean nasal spray, Ayr nasal saline or any other over the counter nasal saline at least 4-5 times a  day for 2 weeks.    Use Ayr gel twice a day and at bedtime for 2 weeks.    If no further bleeding cut back to twice a day saline use and twice a day ayr gel use    If you have any severe allergies take a daily antihistamine (claritin, allergra, zyrtec or similar)    No heavy lifting over 10 pounds, no bending or straining for as long as possible.  Preferably for 2 weeks following a heavy bleed.    Stop any medications that may cause bleeding.  Contact your primary care physician with questions about medications and let them know if you stop a medication.

## 2023-12-20 NOTE — PROGRESS NOTES
Otolaryngology Consultation    Patient: Radha Mayfield  : 2000    Chief Complaint   Patient presents with    Nose Problem     Nasal bleeding.  Referred by, Lissy Perez NP         HPI:  Radha Mayfield is a 23 year old adult seen today for nosebleeds.   Patient expressed concerns for recent epistaxis. Nosebleeds worsened over the last few months and have lasted for several minutes.     Onset- Years+ Worsened over the last few months. Reports 8 epistaxis since October.   Location- Right  Duration- 15 minutes  Able to control- pressure.   Trials- Home packing.   No bleeding or clotting disorder.  No prior nasal trauma or injury.  No prior nasal or sinus surgery.     +Prior Nasal cauterization at the age of 7.   +Hx of allergies and currently using Zyrtec. Flonase in past.       Current Outpatient Rx   Medication Sig Dispense Refill    amphetamine-dextroamphetamine (ADDERALL XR) 15 MG 24 hr capsule Take 1 capsule (15 mg) by mouth daily 30 capsule 0    [START ON 2024] amphetamine-dextroamphetamine (ADDERALL XR) 15 MG 24 hr capsule Take 1 capsule (15 mg) by mouth daily 30 capsule 0    [START ON 2024] amphetamine-dextroamphetamine (ADDERALL XR) 15 MG 24 hr capsule Take 1 capsule (15 mg) by mouth daily 30 capsule 0    cetirizine (ZYRTEC) 10 MG tablet Take 10 mg by mouth daily      ibuprofen (ADVIL/MOTRIN) 800 MG tablet Take 1 tablet (800 mg) by mouth every 8 hours as needed for moderate pain 60 tablet 0       Allergies: No clinical screening - see comments, Oxcarbazepine, and Chlorine     No past medical history on file.    Past Surgical History:   Procedure Laterality Date    APPENDECTOMY      TONSILLECTOMY & ADENOIDECTOMY         ENT family history reviewed    Social History     Tobacco Use    Smoking status: Never     Passive exposure: Never    Smokeless tobacco: Never   Vaping Use    Vaping Use: Never used   Substance Use Topics    Alcohol use: Not Currently    Drug use: Not Currently  "      Review of Systems  ROS: 10 point ROS neg other than the symptoms noted above in the HPI     Physical Exam  /84 (BP Location: Right arm, Patient Position: Sitting, Cuff Size: Adult Large)   Pulse (!) 128   Temp (!) 96.4  F (35.8  C) (Temporal)   Ht 1.6 m (5' 2.99\")   Wt 82.4 kg (181 lb 10.5 oz)   SpO2 96%   BMI 32.19 kg/m      General - The patient is well nourished and well developed, and appears to have good nutritional status.  Alert and oriented to person and place, answers questions and cooperates with examination appropriately.   Head and Face - Normocephalic and atraumatic, with no gross asymmetry noted.  The facial nerve is intact, with strong symmetric movements.  Voice and Breathing - The patient was breathing comfortably without the use of accessory muscles. There was no wheezing, stridor, or stertor.  The patients voice was clear and strong, and had appropriate pitch and quality.  Ears -The external auditory canals are patent, the tympanic membranes are intact without effusion, retraction or mass.  Bony landmarks are intact.  Eyes - Extraocular movements intact, and the pupils were reactive to light.  Sclera were not icteric or injected, conjunctiva were pink and moist.  Mouth - Examination of the oral cavity showed pink, healthy oral mucosa. No lesions or ulcerations noted.  The tongue was mobile and midline, and the dentition were in good condition.    Throat - The walls of the oropharynx were smooth, pink, moist, symmetric, and had no lesions or ulcerations.  The tonsillar pillars and soft palate were symmetric.  The uvula was midline on elevation.    Neck - Normal midline excursion of the laryngotracheal complex during swallowing.  Full range of motion on passive movement.  Palpation of the occipital, submental, submandibular, internal jugular chain, and supraclavicular nodes did not demonstrate any abnormal lymph nodes or masses.  Palpation of the thyroid was soft and smooth, with " no nodules or goiter appreciated.  The trachea was mobile and midline.  Nose - External contour is symmetric, no gross deflection or scars.  Nasal mucosa is pink and moist with no abnormal mucus.  Prominent anterior Kiesselbach plexus on right.      To evaluate the nose due to epistaxis I performed rigid nasal endoscopy. The nose was anesthetized with home afrin or topical lidocaine and neosynephrine in the office.    I began with the LEFT side using a 0 degree rigid nasal endoscope, and then similarly examined the RIGHT side    Findings:  Septum-midline.  There is prominent vessels right anterior nasal Mic box plexus.  Inferior turbinates: Mild bogginess.  Right has 2 small granulomas.  Middle turbinate and middle meatus:  No purulence, no polyposis, no synechiae    Mucosa is  healthy throughout without polyps nor polypoid degeneration  Superior meatus is clear  Frontal recess clear  Sphenoethmoidal clear  Nasopharynx is clear    The patient tolerated procedure well      Impression and Plan- Radhaedwina Mayfield is a 23 year old adult with:      ICD-10-CM    1. H/O epistaxis  Z87.898 sodium chloride (OCEAN) 0.65 % nasal spray     saline nasal (AYR SALINE) GEL topical gel      2. Nasal bleeding  R04.0 lidocaine 2%/oxymetazoline 0.025% nasal solution 2 spray     Adult ENT  Referral     sodium chloride (OCEAN) 0.65 % nasal spray     saline nasal (AYR SALINE) GEL topical gel          We discussed options of nasal cauterization versus nasal cares.  At this time she wishes to start with nasal cares.  Start nasal saline 2 sprays each nostril 3-4 times daily.  Start nasal Ayr gel twice daily.    Follow-up in 6 weeks if there is no improvement recommended cauterization.      Epistaxis (nose bleed) Instructions    With heavy bleeding, start irrigating with very warm Som Med saline irrigation or any other warm saline.  Repeatedly irrigate with warm saline until heavy bleeding stops or improves.     Next gently blow  nose and repeat irrigation.  Once the bleeding has slowed down, clamp nose for 5 minutes    Next apply Afrin (oxymetazoline) spray.  2 sprays to affected nostril.  Repeat after 5 minutes.    Next reapply clamp for 30 minutes.    Gargle and spit with warm saline to remove any clots or blood from your throat.    Lie down with head elevated to 45 degrees if possible for at least 15-30 minutes.    Continue to use Afrin nasal spray, 2 sprays twice a day to the nose for 4 days then stop.  Do not use Afrin long term.    Apply Ayr gel, bacitracin or bactroban antibiotic ointment to the nose twice a day and before bed.  Continue this for 4 days then start daily moisture instructions below.    If your bleeding cannot be controlled go to the closest Emergency room.    You may continue to have some occasional oozing from the nose but the goal is to control the heavy bleeding.    You may also try over the counter NasalCEASE which is a safe packing that you can insert yourself at home.    Daily Nasal Moisture Instructions    Keep your nose moist  Use ocean nasal spray, Ayr nasal saline or any other over the counter nasal saline at least 4-5 times a day for 2 weeks.    Use Ayr gel twice a day and at bedtime for 2 weeks.    If no further bleeding cut back to twice a day saline use and twice a day ayr gel use    If you have any severe allergies take a daily antihistamine (claritin, allergra, zyrtec or similar)    No heavy lifting over 10 pounds, no bending or straining for as long as possible.  Preferably for 2 weeks following a heavy bleed.    Stop any medications that may cause bleeding.  Contact your primary care physician with questions about medications and let them know if you stop a medication.      Nitza Schwartz PA-C  ENT  [facility], [place]

## 2023-12-20 NOTE — PROGRESS NOTES
Clinic Administered Medication Documentation      Depo Provera Documentation    Depo-Provera Standing Order inclusion/exclusion criteria reviewed.     Is this the initial or subsequent dose of Depo Provera? Subsequent dose - patient is within the acceptable window of time (11-15 weeks) for subsequent injection. Pregnancy test not indicated.    Patient meets: inclusion criteria     Is there an active order (written within the past 365 days, with administrations remaining, not ) in the chart? Yes.     Prior to injection, verified patient identity using patient's name and date of birth. Medication was administered. Please see MAR and medication order for additional information.     Vial/Syringe: Single dose vial. Was entire vial of medication used? Yes    Patient instructed to report any adverse reaction to staff immediately.  NEXT INJECTION DUE: 3/6/24 - 4/3/24    Verified that the patient has refills remaining in their prescription.

## 2023-12-20 NOTE — LETTER
2023         RE: Radha Mayfield  [address]  [place] [address]        Dear Colleague,    Thank you for referring your patient, Radha Mayfield, to the [facility] [place]. Please see a copy of my visit note below.    Otolaryngology Consultation    Patient: Radha Mayfield  : 2000    Chief Complaint   Patient presents with     Nose Problem     Nasal bleeding.  Referred by, Lissy Perez NP         HPI:  Radha Mayfield is a 23 year old adult seen today for nosebleeds.   Patient expressed concerns for recent epistaxis. Nosebleeds worsened over the last few months and have lasted for several minutes.     Onset- Years+ Worsened over the last few months. Reports 8 epistaxis since October.   Location- Right  Duration- 15 minutes  Able to control- pressure.   Trials- Home packing.   No bleeding or clotting disorder.  No prior nasal trauma or injury.  No prior nasal or sinus surgery.     +Prior Nasal cauterization at the age of 7.   +Hx of allergies and currently using Zyrtec. Flonase in past.       Current Outpatient Rx   Medication Sig Dispense Refill     amphetamine-dextroamphetamine (ADDERALL XR) 15 MG 24 hr capsule Take 1 capsule (15 mg) by mouth daily 30 capsule 0     [START ON 2024] amphetamine-dextroamphetamine (ADDERALL XR) 15 MG 24 hr capsule Take 1 capsule (15 mg) by mouth daily 30 capsule 0     [START ON 2024] amphetamine-dextroamphetamine (ADDERALL XR) 15 MG 24 hr capsule Take 1 capsule (15 mg) by mouth daily 30 capsule 0     cetirizine (ZYRTEC) 10 MG tablet Take 10 mg by mouth daily       ibuprofen (ADVIL/MOTRIN) 800 MG tablet Take 1 tablet (800 mg) by mouth every 8 hours as needed for moderate pain 60 tablet 0       Allergies: No clinical screening - see comments, Oxcarbazepine, and Chlorine     No past medical history on file.    Past Surgical History:   Procedure Laterality Date     APPENDECTOMY       TONSILLECTOMY & ADENOIDECTOMY         ENT  "family history reviewed    Social History     Tobacco Use     Smoking status: Never     Passive exposure: Never     Smokeless tobacco: Never   Vaping Use     Vaping Use: Never used   Substance Use Topics     Alcohol use: Not Currently     Drug use: Not Currently       Review of Systems  ROS: 10 point ROS neg other than the symptoms noted above in the HPI     Physical Exam  /84 (BP Location: Right arm, Patient Position: Sitting, Cuff Size: Adult Large)   Pulse (!) 128   Temp (!) 96.4  F (35.8  C) (Temporal)   Ht 1.6 m (5' 2.99\")   Wt 82.4 kg (181 lb 10.5 oz)   SpO2 96%   BMI 32.19 kg/m      General - The patient is well nourished and well developed, and appears to have good nutritional status.  Alert and oriented to person and place, answers questions and cooperates with examination appropriately.   Head and Face - Normocephalic and atraumatic, with no gross asymmetry noted.  The facial nerve is intact, with strong symmetric movements.  Voice and Breathing - The patient was breathing comfortably without the use of accessory muscles. There was no wheezing, stridor, or stertor.  The patients voice was clear and strong, and had appropriate pitch and quality.  Ears -The external auditory canals are patent, the tympanic membranes are intact without effusion, retraction or mass.  Bony landmarks are intact.  Eyes - Extraocular movements intact, and the pupils were reactive to light.  Sclera were not icteric or injected, conjunctiva were pink and moist.  Mouth - Examination of the oral cavity showed pink, healthy oral mucosa. No lesions or ulcerations noted.  The tongue was mobile and midline, and the dentition were in good condition.    Throat - The walls of the oropharynx were smooth, pink, moist, symmetric, and had no lesions or ulcerations.  The tonsillar pillars and soft palate were symmetric.  The uvula was midline on elevation.    Neck - Normal midline excursion of the laryngotracheal complex during " swallowing.  Full range of motion on passive movement.  Palpation of the occipital, submental, submandibular, internal jugular chain, and supraclavicular nodes did not demonstrate any abnormal lymph nodes or masses.  Palpation of the thyroid was soft and smooth, with no nodules or goiter appreciated.  The trachea was mobile and midline.  Nose - External contour is symmetric, no gross deflection or scars.  Nasal mucosa is pink and moist with no abnormal mucus.  Prominent anterior Kiesselbach plexus on right.      To evaluate the nose due to epistaxis I performed rigid nasal endoscopy. The nose was anesthetized with home afrin or topical lidocaine and neosynephrine in the office.    I began with the LEFT side using a 0 degree rigid nasal endoscope, and then similarly examined the RIGHT side    Findings:  Septum-midline.  There is prominent vessels right anterior nasal Mic box plexus.  Inferior turbinates: Mild bogginess.  Right has 2 small granulomas.  Middle turbinate and middle meatus:  No purulence, no polyposis, no synechiae    Mucosa is  healthy throughout without polyps nor polypoid degeneration  Superior meatus is clear  Frontal recess clear  Sphenoethmoidal clear  Nasopharynx is clear    The patient tolerated procedure well      Impression and Plan- Radha Mayfield is a 23 year old adult with:      ICD-10-CM    1. H/O epistaxis  Z87.898 sodium chloride (OCEAN) 0.65 % nasal spray     saline nasal (AYR SALINE) GEL topical gel      2. Nasal bleeding  R04.0 lidocaine 2%/oxymetazoline 0.025% nasal solution 2 spray     Adult ENT  Referral     sodium chloride (OCEAN) 0.65 % nasal spray     saline nasal (AYR SALINE) GEL topical gel          We discussed options of nasal cauterization versus nasal cares.  At this time she wishes to start with nasal cares.  Start nasal saline 2 sprays each nostril 3-4 times daily.  Start nasal Ayr gel twice daily.    Follow-up in 6 weeks if there is no improvement  recommended cauterization.      Epistaxis (nose bleed) Instructions    With heavy bleeding, start irrigating with very warm Som Med saline irrigation or any other warm saline.  Repeatedly irrigate with warm saline until heavy bleeding stops or improves.     Next gently blow nose and repeat irrigation.  Once the bleeding has slowed down, clamp nose for 5 minutes    Next apply Afrin (oxymetazoline) spray.  2 sprays to affected nostril.  Repeat after 5 minutes.    Next reapply clamp for 30 minutes.    Gargle and spit with warm saline to remove any clots or blood from your throat.    Lie down with head elevated to 45 degrees if possible for at least 15-30 minutes.    Continue to use Afrin nasal spray, 2 sprays twice a day to the nose for 4 days then stop.  Do not use Afrin long term.    Apply Ayr gel, bacitracin or bactroban antibiotic ointment to the nose twice a day and before bed.  Continue this for 4 days then start daily moisture instructions below.    If your bleeding cannot be controlled go to the closest Emergency room.    You may continue to have some occasional oozing from the nose but the goal is to control the heavy bleeding.    You may also try over the counter NasalCEASE which is a safe packing that you can insert yourself at home.    Daily Nasal Moisture Instructions    Keep your nose moist  Use ocean nasal spray, Ayr nasal saline or any other over the counter nasal saline at least 4-5 times a day for 2 weeks.    Use Ayr gel twice a day and at bedtime for 2 weeks.    If no further bleeding cut back to twice a day saline use and twice a day ayr gel use    If you have any severe allergies take a daily antihistamine (claritin, allergra, zyrtec or similar)    No heavy lifting over 10 pounds, no bending or straining for as long as possible.  Preferably for 2 weeks following a heavy bleed.    Stop any medications that may cause bleeding.  Contact your primary care physician with questions about medications and  let them know if you stop a medication.      Nitza Schwartz PA-C  ENT  [facility], [place]      Again, thank you for allowing me to participate in the care of your patient.        Sincerely,        Nitza Schwartz PA-C

## 2023-12-28 ENCOUNTER — THERAPY VISIT (OUTPATIENT)
Dept: PHYSICAL THERAPY | Facility: HOSPITAL | Age: 23
End: 2023-12-28
Attending: NURSE PRACTITIONER
Payer: COMMERCIAL

## 2023-12-28 DIAGNOSIS — N94.2 VAGINISMUS: Primary | ICD-10-CM

## 2023-12-28 PROCEDURE — 97110 THERAPEUTIC EXERCISES: CPT | Mod: GP

## 2023-12-28 PROCEDURE — 97140 MANUAL THERAPY 1/> REGIONS: CPT | Mod: GP

## 2024-01-18 ENCOUNTER — THERAPY VISIT (OUTPATIENT)
Dept: PHYSICAL THERAPY | Facility: HOSPITAL | Age: 24
End: 2024-01-18
Attending: NURSE PRACTITIONER
Payer: COMMERCIAL

## 2024-01-18 DIAGNOSIS — N94.2 VAGINISMUS: Primary | ICD-10-CM

## 2024-01-18 DIAGNOSIS — M62.89 PELVIC FLOOR DYSFUNCTION IN FEMALE: ICD-10-CM

## 2024-01-18 PROCEDURE — 97110 THERAPEUTIC EXERCISES: CPT | Mod: GP

## 2024-01-18 PROCEDURE — 97530 THERAPEUTIC ACTIVITIES: CPT | Mod: GP

## 2024-01-18 PROCEDURE — 97140 MANUAL THERAPY 1/> REGIONS: CPT | Mod: GP

## 2024-01-31 ENCOUNTER — OFFICE VISIT (OUTPATIENT)
Dept: OTOLARYNGOLOGY | Facility: OTHER | Age: 24
End: 2024-01-31
Attending: PHYSICIAN ASSISTANT
Payer: COMMERCIAL

## 2024-01-31 VITALS
WEIGHT: 180 LBS | OXYGEN SATURATION: 98 % | TEMPERATURE: 97.6 F | BODY MASS INDEX: 31.89 KG/M2 | HEART RATE: 113 BPM | HEIGHT: 63 IN | RESPIRATION RATE: 18 BRPM | SYSTOLIC BLOOD PRESSURE: 118 MMHG | DIASTOLIC BLOOD PRESSURE: 70 MMHG

## 2024-01-31 DIAGNOSIS — H61.23 BILATERAL IMPACTED CERUMEN: ICD-10-CM

## 2024-01-31 DIAGNOSIS — Z87.898 H/O EPISTAXIS: Primary | ICD-10-CM

## 2024-01-31 PROCEDURE — 92504 EAR MICROSCOPY EXAMINATION: CPT | Performed by: PHYSICIAN ASSISTANT

## 2024-01-31 PROCEDURE — 69210 REMOVE IMPACTED EAR WAX UNI: CPT | Performed by: PHYSICIAN ASSISTANT

## 2024-01-31 PROCEDURE — G0463 HOSPITAL OUTPT CLINIC VISIT: HCPCS | Mod: 25

## 2024-01-31 PROCEDURE — 99212 OFFICE O/P EST SF 10 MIN: CPT | Mod: 25 | Performed by: PHYSICIAN ASSISTANT

## 2024-01-31 PROCEDURE — 31231 NASAL ENDOSCOPY DX: CPT | Performed by: PHYSICIAN ASSISTANT

## 2024-01-31 ASSESSMENT — PATIENT HEALTH QUESTIONNAIRE - PHQ9: SUM OF ALL RESPONSES TO PHQ QUESTIONS 1-9: 13

## 2024-01-31 ASSESSMENT — PAIN SCALES - GENERAL: PAINLEVEL: NO PAIN (0)

## 2024-01-31 NOTE — PROGRESS NOTES
Chief Complaint   Patient presents with    Epistaxis     Follow-up       Patient returns to ENT for follow up exam. She was last seen on 12/20/23 for epistaxis and started on nasal cares. She returns today for recheck and has been doing fairly well. No recent epistaxis. Currently using the nasal saline/ Ayr gel without concerns.   No concerns with nasal congestion, breathing well. Allergies controlled w/ Zyrtec.     Past Hx:  Onset- Years+ Worsened over the last few months. Reports 8 epistaxis since October.   Location- Right  Duration- 15 minutes  Able to control- pressure.   Trials- Home packing.   No bleeding or clotting disorder.  No prior nasal trauma or injury.  No prior nasal or sinus surgery.      +Prior Nasal cauterization at the age of 7.   +Hx of allergies and currently using Zyrtec. Flonase in past.            Depression Screening Follow Up      1/31/2024     9:00 AM   PHQ   PHQ-9 Total Score 13   Q9: Thoughts of better off dead/self-harm past 2 weeks Not at all     Patient reports see a therapist about once every 1-2 weeks and has been working with her for several months. She has felt good results with her current therapist and has upcoming appointment to continue care. Denies any new concerns today.     Follow Up Actions Taken  Crisis resource information provided in After Visit Summary  Referred patient back to current mental health provider.  Follow up recommended: within 2 weeks. She has upcoming appointment scheduled.      No past medical history on file.     Allergies   Allergen Reactions    No Clinical Screening - See Comments     Oxcarbazepine      rash    Chlorine Rash     Current Outpatient Medications   Medication    amphetamine-dextroamphetamine (ADDERALL XR) 15 MG 24 hr capsule    amphetamine-dextroamphetamine (ADDERALL XR) 15 MG 24 hr capsule    [START ON 2/14/2024] amphetamine-dextroamphetamine (ADDERALL XR) 15 MG 24 hr capsule    cetirizine (ZYRTEC) 10 MG tablet    ibuprofen  "(ADVIL/MOTRIN) 800 MG tablet    saline nasal (AYR SALINE) GEL topical gel    sodium chloride (OCEAN) 0.65 % nasal spray     Current Facility-Administered Medications   Medication    medroxyPROGESTERone (DEPO-PROVERA) injection 150 mg    medroxyPROGESTERone (DEPO-PROVERA) injection 150 mg     ROS -SEE HPI  /70 (BP Location: Right arm, Patient Position: Sitting, Cuff Size: Adult Regular)   Pulse 113   Temp 97.6  F (36.4  C) (Tympanic)   Resp 18   Ht 1.6 m (5' 3\")   Wt 81.6 kg (180 lb)   SpO2 98%   BMI 31.89 kg/m      General - The patient is well nourished and well developed, and appears to have good nutritional status.  Alert and oriented to person and place, answers questions and cooperates with examination appropriately.   Head and Face - Normocephalic and atraumatic, with no gross asymmetry noted.  The facial nerve is intact, with strong symmetric movements.  Voice and Breathing - The patient was breathing comfortably without the use of accessory muscles. There was no wheezing, stridor, or stertor.  The patients voice was clear and strong, and had appropriate pitch and quality.  On examination of the ears, I found that the ear(s) were completely impacted with dense cerumen.  Therefore, I positioned them in the examination chair in a semi-supine position, beginning with the right side.  I used the binocular surgical microscope to perform cerumen removal.  I began by using a cerumen loop to gently lift the edges of the cerumen mass away from the walls of the external canal.  Once I did this, I was able to suction away fragments of wax and debris using suction.  Once the mass was loose enough, the entire plug was pulled from the canal.  The tympanic membrane was intact, no sign of perforation or middle ear effusion.    I turned my attention to the contralateral side once again using the binocular surgical microscope to perform cerumen removal.  I began by using a cerumen loop to gently lift the edges of " the cerumen mass away from the walls of the external canal.  Once I did this, I was able to suction away fragments of wax and debris using suction.  Once the mass was loose enough, the entire plug was pulled from the canal.  The tympanic membrane was intact, no sign of perforation or middle ear effusion.    Eyes - Extraocular movements intact, and the pupils were reactive to light.  Sclera were not icteric or injected, conjunctiva were pink and moist.  Mouth - Examination of the oral cavity showed pink, healthy oral mucosa. No lesions or ulcerations noted.  The tongue was mobile and midline, and the dentition were in good condition.    Throat - The walls of the oropharynx were smooth, pink, moist, symmetric, and had no lesions or ulcerations.  The tonsillar pillars and soft palate were symmetric.  The uvula was midline on elevation.    Neck - Normal midline excursion of the laryngotracheal complex during swallowing.  Full range of motion on passive movement.  Palpation of the occipital, submental, submandibular, internal jugular chain, and supraclavicular nodes did not demonstrate any abnormal lymph nodes or masses.  Palpation of the thyroid was soft and smooth, with no nodules or goiter appreciated.  The trachea was mobile and midline.  Nose - External contour is symmetric, no gross deflection or scars.  Nasal mucosa is pink and moist with no abnormal mucus.  Prominent anterior Kiesselbach plexus on right.        To evaluate the nose due to epistaxis I performed rigid nasal endoscopy. The nose was anesthetized with home afrin or topical lidocaine and neosynephrine in the office.     I began with the LEFT side using a 0 degree rigid nasal endoscope, and then similarly examined the RIGHT side     Findings:  Septum-midline.  Inferior turbinates: Mild bogginess.  Right has 1 small granulomas. Improved  Middle turbinate and middle meatus:  No purulence, no polyposis, no synechiae     Mucosa is  healthy throughout  without polyps nor polypoid degeneration  Superior meatus is clear  Frontal recess clear  Sphenoethmoidal clear  Nasopharynx is clear     The patient tolerated procedure well       ASSESSMENT/ PLAN:    ICD-10-CM    1. H/O epistaxis  Z87.898 lidocaine 2%-oxymetazoline 0.025% nasal solution 2 spray      2. Bilateral impacted cerumen  H61.23             Continue with nasal saline and nasal Ayr gel.  Follow up if any recurrent epistaxis  Declined cauterization.       Ears were cleaned today.  She tolerated well.  Reassured normal ear exam.  Return as needed for ear cleaning    She will follow with her clinical outpatient therapist.       Nitza Schwartz PA-C  ENT  [facility], [place]

## 2024-01-31 NOTE — LETTER
1/31/2024         RE: Radha Mayfield  [address]  [place] [address]        Dear Colleague,    Thank you for referring your patient, Radha Mayfield, to the [facility] [place]. Please see a copy of my visit note below.    Chief Complaint   Patient presents with     Epistaxis     Follow-up       Patient returns to ENT for follow up exam. She was last seen on 12/20/23 for epistaxis and started on nasal cares. She returns today for recheck and has been doing fairly well. No recent epistaxis. Currently using the nasal saline/ Ayr gel without concerns.   No concerns with nasal congestion, breathing well. Allergies controlled w/ Zyrtec.     Past Hx:  Onset- Years+ Worsened over the last few months. Reports 8 epistaxis since October.   Location- Right  Duration- 15 minutes  Able to control- pressure.   Trials- Home packing.   No bleeding or clotting disorder.  No prior nasal trauma or injury.  No prior nasal or sinus surgery.      +Prior Nasal cauterization at the age of 7.   +Hx of allergies and currently using Zyrtec. Flonase in past.            Depression Screening Follow Up      1/31/2024     9:00 AM   PHQ   PHQ-9 Total Score 13   Q9: Thoughts of better off dead/self-harm past 2 weeks Not at all     Patient reports see a therapist about once every 1-2 weeks and has been working with her for several months. She has felt good results with her current therapist and has upcoming appointment to continue care. Denies any new concerns today.     Follow Up Actions Taken  Crisis resource information provided in After Visit Summary  Referred patient back to current mental health provider.  Follow up recommended: within 2 weeks. She has upcoming appointment scheduled.      No past medical history on file.     Allergies   Allergen Reactions     No Clinical Screening - See Comments      Oxcarbazepine      rash     Chlorine Rash     Current Outpatient Medications   Medication      "amphetamine-dextroamphetamine (ADDERALL XR) 15 MG 24 hr capsule     amphetamine-dextroamphetamine (ADDERALL XR) 15 MG 24 hr capsule     [START ON 2/14/2024] amphetamine-dextroamphetamine (ADDERALL XR) 15 MG 24 hr capsule     cetirizine (ZYRTEC) 10 MG tablet     ibuprofen (ADVIL/MOTRIN) 800 MG tablet     saline nasal (AYR SALINE) GEL topical gel     sodium chloride (OCEAN) 0.65 % nasal spray     Current Facility-Administered Medications   Medication     medroxyPROGESTERone (DEPO-PROVERA) injection 150 mg     medroxyPROGESTERone (DEPO-PROVERA) injection 150 mg     ROS -SEE HPI  /70 (BP Location: Right arm, Patient Position: Sitting, Cuff Size: Adult Regular)   Pulse 113   Temp 97.6  F (36.4  C) (Tympanic)   Resp 18   Ht 1.6 m (5' 3\")   Wt 81.6 kg (180 lb)   SpO2 98%   BMI 31.89 kg/m      General - The patient is well nourished and well developed, and appears to have good nutritional status.  Alert and oriented to person and place, answers questions and cooperates with examination appropriately.   Head and Face - Normocephalic and atraumatic, with no gross asymmetry noted.  The facial nerve is intact, with strong symmetric movements.  Voice and Breathing - The patient was breathing comfortably without the use of accessory muscles. There was no wheezing, stridor, or stertor.  The patients voice was clear and strong, and had appropriate pitch and quality.  On examination of the ears, I found that the ear(s) were completely impacted with dense cerumen.  Therefore, I positioned them in the examination chair in a semi-supine position, beginning with the right side.  I used the binocular surgical microscope to perform cerumen removal.  I began by using a cerumen loop to gently lift the edges of the cerumen mass away from the walls of the external canal.  Once I did this, I was able to suction away fragments of wax and debris using suction.  Once the mass was loose enough, the entire plug was pulled from the " canal.  The tympanic membrane was intact, no sign of perforation or middle ear effusion.    I turned my attention to the contralateral side once again using the binocular surgical microscope to perform cerumen removal.  I began by using a cerumen loop to gently lift the edges of the cerumen mass away from the walls of the external canal.  Once I did this, I was able to suction away fragments of wax and debris using suction.  Once the mass was loose enough, the entire plug was pulled from the canal.  The tympanic membrane was intact, no sign of perforation or middle ear effusion.    Eyes - Extraocular movements intact, and the pupils were reactive to light.  Sclera were not icteric or injected, conjunctiva were pink and moist.  Mouth - Examination of the oral cavity showed pink, healthy oral mucosa. No lesions or ulcerations noted.  The tongue was mobile and midline, and the dentition were in good condition.    Throat - The walls of the oropharynx were smooth, pink, moist, symmetric, and had no lesions or ulcerations.  The tonsillar pillars and soft palate were symmetric.  The uvula was midline on elevation.    Neck - Normal midline excursion of the laryngotracheal complex during swallowing.  Full range of motion on passive movement.  Palpation of the occipital, submental, submandibular, internal jugular chain, and supraclavicular nodes did not demonstrate any abnormal lymph nodes or masses.  Palpation of the thyroid was soft and smooth, with no nodules or goiter appreciated.  The trachea was mobile and midline.  Nose - External contour is symmetric, no gross deflection or scars.  Nasal mucosa is pink and moist with no abnormal mucus.  Prominent anterior Kiesselbach plexus on right.        To evaluate the nose due to epistaxis I performed rigid nasal endoscopy. The nose was anesthetized with home afrin or topical lidocaine and neosynephrine in the office.     I began with the LEFT side using a 0 degree rigid nasal  endoscope, and then similarly examined the RIGHT side     Findings:  Septum-midline.  Inferior turbinates: Mild bogginess.  Right has 1 small granulomas. Improved  Middle turbinate and middle meatus:  No purulence, no polyposis, no synechiae     Mucosa is  healthy throughout without polyps nor polypoid degeneration  Superior meatus is clear  Frontal recess clear  Sphenoethmoidal clear  Nasopharynx is clear     The patient tolerated procedure well       ASSESSMENT/ PLAN:    ICD-10-CM    1. H/O epistaxis  Z87.898 lidocaine 2%-oxymetazoline 0.025% nasal solution 2 spray      2. Bilateral impacted cerumen  H61.23             Continue with nasal saline and nasal Ayr gel.  Follow up if any recurrent epistaxis  Declined cauterization.       Ears were cleaned today.  She tolerated well.  Reassured normal ear exam.  Return as needed for ear cleaning    She will follow with her clinical outpatient therapist.       Nitza Schwartz PA-C  ENT  [facility], [place]        Again, thank you for allowing me to participate in the care of your patient.        Sincerely,        Nitza Schwartz PA-C

## 2024-01-31 NOTE — PATIENT INSTRUCTIONS
Ears were cleaned.   Normal ear exam.     Continue with Nasal saline and Nasal Ayr gel.   Use saline twice  a day and gel every 1-2 times a day    Follow up if recurrent nosebleeds      Thank you for allowing ALBA Toribio and our ENT team to participate in your care.  If your medications are too expensive, please give the nurse a call.  We can possibly change this medication.  If you have a scheduling or an appointment question please contact our Health Unit Coordinator at their direct line [phone].   ALL nursing questions or concerns can be directed to your ENT nurse, Natalie at: [phone].

## 2024-02-01 ENCOUNTER — THERAPY VISIT (OUTPATIENT)
Dept: PHYSICAL THERAPY | Facility: HOSPITAL | Age: 24
End: 2024-02-01
Attending: NURSE PRACTITIONER
Payer: COMMERCIAL

## 2024-02-01 DIAGNOSIS — M62.89 PELVIC FLOOR DYSFUNCTION IN FEMALE: Primary | ICD-10-CM

## 2024-02-01 PROCEDURE — 97140 MANUAL THERAPY 1/> REGIONS: CPT | Mod: GP

## 2024-02-01 PROCEDURE — 97110 THERAPEUTIC EXERCISES: CPT | Mod: GP

## 2024-02-22 NOTE — PROGRESS NOTES
"  Assessment & Plan     Attention deficit hyperactivity disorder (ADHD), unspecified ADHD type  Well controlled. Continue medication regimen as ordered. Pt shows no signs of diversion or abuse. Up to 3 months of her ADD/ADHD medications given today. Pt is aware that she is solely responsible for protections of the prescriptions. I will not tolerate any lost or stolen prescriptions lightly. Will see pt back in office in 3 months. Stimulant agreement up to date.  - amphetamine-dextroamphetamine (ADDERALL XR) 15 MG 24 hr capsule  Dispense: 30 capsule; Refill: 0  - amphetamine-dextroamphetamine (ADDERALL XR) 15 MG 24 hr capsule  Dispense: 30 capsule; Refill: 0  - amphetamine-dextroamphetamine (ADDERALL XR) 15 MG 24 hr capsule  Dispense: 30 capsule; Refill: 0    Anxiety  Continues therapy at [facility], which is helping. Psychiatry will evaluate her on March 7th for anti-depressants.     Screening for gonorrhea  Lab pending. Will notify patient of results and intervene accordingly.     - GC/Chlamydia by PCR - HI,    Prescription drug management      BMI  Estimated body mass index is 34.42 kg/m  as calculated from the following:    Height as of this encounter: 1.6 m (5' 3\").    Weight as of this encounter: 88.1 kg (194 lb 4.8 oz).   Weight management plan: Discussed healthy diet and exercise guidelines    Return in about 3 months (around 5/26/2024).    Devon Garcia is a 24 year old, presenting for the following health issues:  adhd    HPI     ADHD  Medication Followup - Adderall XR 15 mg once daily   Taking Medication as prescribed: yes  Side Effects:  None, no chest pain, shortness of breath, dizziness, syncope, insomnia, or palpitations; weight stable   Medication Helping Symptoms:  yes    -She does have some anxiety and PTSD. Seeing a counselor at [facility]. Feels this is helping. No thoughts of suicide or homicide. Seeing Jennifer aCrranza in [place], [place] for anxiety and states that it is going well. Will be evaluated " "for anti-depressant on March 7th.      Drug screen appropriate in 8/2023. Stimulant agreement done in 8/2023.      Due for chlamydia testing. Agreeable.     Due for pap: Working with PT every 2-3 weeks for vagisimus - will complete pap in future with obgyn.    Review of Systems  Constitutional, neuro, ENT, endocrine, pulmonary, cardiac, gastrointestinal, genitourinary, musculoskeletal, integument and psychiatric systems are negative, except as otherwise noted.      Objective    /58 (BP Location: Right arm, Patient Position: Sitting, Cuff Size: Adult Regular)   Pulse 116   Temp 99.3  F (37.4  C) (Temporal)   Ht 1.6 m (5' 3\")   Wt 88.1 kg (194 lb 4.8 oz)   LMP 02/18/2024 (Approximate)   SpO2 99%   BMI 34.42 kg/m    Body mass index is 34.42 kg/m .    Physical Exam   GENERAL: alert and no distress  EYES: Eyes grossly normal to inspection, conjunctivae and sclerae normal  RESP: lungs clear to auscultation - no rales, rhonchi or wheezes  CV: regular rate and rhythm, normal S1 S2, no S3 or S4, no murmur, click or rub, no peripheral edema  MS: no gross musculoskeletal defects noted, no edema  SKIN: no suspicious lesions or rashes  NEURO: Normal strength and tone, mentation intact and speech normal  PSYCH: mentation appears normal, affect normal    G/C pending      ALBA Gordon-S2     I was present with the nurse practitioner student who participated in the service and in the documentation of the note. I have verified the history and personally performed the physical exam and medical decision making. I agree with the assessment and plan of care as documented in the note.     Lissy Perez CNP    "

## 2024-02-26 ENCOUNTER — OFFICE VISIT (OUTPATIENT)
Dept: FAMILY MEDICINE | Facility: OTHER | Age: 24
End: 2024-02-26
Attending: NURSE PRACTITIONER
Payer: COMMERCIAL

## 2024-02-26 VITALS
TEMPERATURE: 99.3 F | SYSTOLIC BLOOD PRESSURE: 112 MMHG | BODY MASS INDEX: 34.43 KG/M2 | HEIGHT: 63 IN | OXYGEN SATURATION: 99 % | WEIGHT: 194.3 LBS | HEART RATE: 84 BPM | DIASTOLIC BLOOD PRESSURE: 58 MMHG

## 2024-02-26 DIAGNOSIS — F41.9 ANXIETY: ICD-10-CM

## 2024-02-26 DIAGNOSIS — Z11.3 SCREENING FOR GONORRHEA: ICD-10-CM

## 2024-02-26 DIAGNOSIS — F90.9 ATTENTION DEFICIT HYPERACTIVITY DISORDER (ADHD), UNSPECIFIED ADHD TYPE: Primary | ICD-10-CM

## 2024-02-26 LAB
C TRACH DNA SPEC QL PROBE+SIG AMP: NEGATIVE
N GONORRHOEA DNA SPEC QL NAA+PROBE: NEGATIVE

## 2024-02-26 PROCEDURE — 99214 OFFICE O/P EST MOD 30 MIN: CPT | Performed by: NURSE PRACTITIONER

## 2024-02-26 PROCEDURE — G0463 HOSPITAL OUTPT CLINIC VISIT: HCPCS

## 2024-02-26 PROCEDURE — 87491 CHLMYD TRACH DNA AMP PROBE: CPT | Mod: ZL | Performed by: NURSE PRACTITIONER

## 2024-02-26 RX ORDER — DEXTROAMPHETAMINE SACCHARATE, AMPHETAMINE ASPARTATE MONOHYDRATE, DEXTROAMPHETAMINE SULFATE AND AMPHETAMINE SULFATE 3.75; 3.75; 3.75; 3.75 MG/1; MG/1; MG/1; MG/1
15 CAPSULE, EXTENDED RELEASE ORAL DAILY
Qty: 30 CAPSULE | Refills: 0 | Status: SHIPPED | OUTPATIENT
Start: 2024-04-11 | End: 2024-08-28

## 2024-02-26 RX ORDER — DEXTROAMPHETAMINE SACCHARATE, AMPHETAMINE ASPARTATE MONOHYDRATE, DEXTROAMPHETAMINE SULFATE AND AMPHETAMINE SULFATE 3.75; 3.75; 3.75; 3.75 MG/1; MG/1; MG/1; MG/1
15 CAPSULE, EXTENDED RELEASE ORAL DAILY
Qty: 30 CAPSULE | Refills: 0 | Status: SHIPPED | OUTPATIENT
Start: 2024-05-10 | End: 2024-08-28

## 2024-02-26 RX ORDER — DEXTROAMPHETAMINE SACCHARATE, AMPHETAMINE ASPARTATE MONOHYDRATE, DEXTROAMPHETAMINE SULFATE AND AMPHETAMINE SULFATE 3.75; 3.75; 3.75; 3.75 MG/1; MG/1; MG/1; MG/1
15 CAPSULE, EXTENDED RELEASE ORAL DAILY
Qty: 30 CAPSULE | Refills: 0 | Status: SHIPPED | OUTPATIENT
Start: 2024-03-12 | End: 2024-05-28

## 2024-02-26 ASSESSMENT — PAIN SCALES - GENERAL: PAINLEVEL: NO PAIN (0)

## 2024-02-26 NOTE — COMMUNITY RESOURCES LIST (ENGLISH)
02/26/2024   [facility]  N/A  For questions about this resource list or additional care needs, please contact your primary care clinic or care manager.  Phone: [phone]   Email: N/A   Address: [address] [place] [id number]   Hours: N/A        Financial Stability       Rent and mortgage payment assistance  1  [facility] Distance: 6.53 miles      In-Person   [address] [place] [address]  Language: English  Hours: Mon - Fri 9:00 AM - 12:00 PM , Mon - Fri 1:00 PM - 5:00 PM  Fees: Free   Phone: (283) 722-1390 Email: jacquie@[facility] Moore[facility]orns[facility]ney[facility]IVO."[employer] Website: https://[employer].[facility]ney[facility].org/northern/[place]          Food and Nutrition       Food pantry  2  [facility] Distance: 6.53 miles      Pickup   [address] [place] [address]  Language: English  Hours: Mon 9:00 AM - 11:00 AM , Tue 1:00 PM - 3:00 PM , Wed - Thu 9:00 AM - 11:00 AM  Fees: Free, Self Pay   Phone: (365) 316-8857 Email: jacquie@[facility]orns[facility]ney[facility]O."[employer] Website: https://[employer].[email or web address]/[facility]/[place]     3  [facility] [employer] [facility] Free Pant Distance: 22.5 miles      Pickup   [address] [place] [address]  Language: English  Hours: Mon - Sun Open 24 Hours  Fees: Free   Phone: (133) 495-1210 Email: roque@[employer]..org Website: http://www.[employer].org     SNAP application assistance  4  [facility] - Economic Services & Supports - [place] Distance: 6.61 miles      In-Person, Phone/Virtual   [address] [place] [address]  Language: English  Hours: Mon - Fri 8:00 AM - 4:30 PM  Fees: Free   Phone: (900) 686-5622 Website: https://www.[facility].gov/eshwnhypopt-g-w/public-health-human-services/economic-services-supports     5  [facility] - Economic Services & Supports  - [place] Distance: 22.66 miles      In-Person, Phone/Virtual   [address] [place] [address]  Language: English  Hours: Mon - Fri 8:00 AM - 4:30 PM  Fees: Free   Phone: (546) 589-5531 Email: financialassistance@[facility] Website: https://www.[facility]/jutonxycual-p-p/public-health-human-services/economic-services-supports     Soup kitchen or free meals  6  [facility] Distance: 6.53 miles      Pick   [address] [place] [address]  Language: English  Hours: Mon - Fri 4:00 PM - 4:45 PM  Fees: Free   Phone: (263) 350-1669 Email: jacquie@[facility].org Website: https://[facility].org/northern/[place]          Important Numbers & Websites       Emergency Services   911  [facility] Services   311  Poison Control   (707) 998-6932  Suicide Prevention Lifeline   (993) 959-3077 (TALK)  Child Abuse Hotline   (734) 161-3389 (4-A-Child)  Sexual Assault Hotline   (525) 347-6049 (HOPE)  National Runaway Safeline   (810) 300-6191 (RUNAWAY)  All-Options Talkline   (358) 181-1990  Substance Abuse Referral   (104) 560-9253 (HELP)

## 2024-03-05 ENCOUNTER — THERAPY VISIT (OUTPATIENT)
Dept: PHYSICAL THERAPY | Facility: HOSPITAL | Age: 24
End: 2024-03-05
Attending: NURSE PRACTITIONER
Payer: COMMERCIAL

## 2024-03-05 PROCEDURE — 97140 MANUAL THERAPY 1/> REGIONS: CPT | Mod: GP

## 2024-03-06 ENCOUNTER — ALLIED HEALTH/NURSE VISIT (OUTPATIENT)
Dept: OBGYN | Facility: OTHER | Age: 24
End: 2024-03-06
Payer: COMMERCIAL

## 2024-03-06 DIAGNOSIS — Z30.42 ENCOUNTER FOR SURVEILLANCE OF INJECTABLE CONTRACEPTIVE: Primary | ICD-10-CM

## 2024-03-06 PROCEDURE — 250N000011 HC RX IP 250 OP 636: Mod: JZ | Performed by: NURSE PRACTITIONER

## 2024-03-06 PROCEDURE — 96372 THER/PROPH/DIAG INJ SC/IM: CPT | Performed by: NURSE PRACTITIONER

## 2024-03-06 RX ADMIN — MEDROXYPROGESTERONE ACETATE 150 MG: 150 INJECTION, SUSPENSION INTRAMUSCULAR at 10:09

## 2024-03-06 NOTE — PROGRESS NOTES
Clinic Administered Medication Documentation      Depo Provera Documentation    Depo-Provera Standing Order inclusion/exclusion criteria reviewed.     Is this the initial or subsequent dose of Depo Provera? Subsequent dose - patient is within the acceptable window of time (11-15 weeks) for subsequent injection. Pregnancy test not indicated. Injection window per last injection 3/6/24-4/3/24.    Patient meets: inclusion criteria     Is there an active order (written within the past 365 days, with administrations remaining, not ) in the chart? Yes.     Prior to injection, verified patient identity using patient's name and date of birth. Medication was administered. Please see MAR and medication order for additional information.     Vial/Syringe: Single dose vial. Was entire vial of medication used? Yes    Patient instructed to report any adverse reaction to staff immediately.  NEXT INJECTION DUE: 24 - 24    Verified that the patient has refills remaining in their prescription.

## 2024-03-19 ENCOUNTER — THERAPY VISIT (OUTPATIENT)
Dept: PHYSICAL THERAPY | Facility: HOSPITAL | Age: 24
End: 2024-03-19
Attending: NURSE PRACTITIONER
Payer: COMMERCIAL

## 2024-03-19 DIAGNOSIS — M62.89 PELVIC FLOOR DYSFUNCTION IN FEMALE: ICD-10-CM

## 2024-03-19 DIAGNOSIS — N94.2 VAGINISMUS: Primary | ICD-10-CM

## 2024-03-19 PROCEDURE — 97140 MANUAL THERAPY 1/> REGIONS: CPT | Mod: GP

## 2024-04-02 ENCOUNTER — THERAPY VISIT (OUTPATIENT)
Dept: PHYSICAL THERAPY | Facility: HOSPITAL | Age: 24
End: 2024-04-02
Attending: NURSE PRACTITIONER
Payer: COMMERCIAL

## 2024-04-02 DIAGNOSIS — N94.2 VAGINISMUS: Primary | ICD-10-CM

## 2024-04-02 PROCEDURE — 97140 MANUAL THERAPY 1/> REGIONS: CPT | Mod: GP

## 2024-04-25 ENCOUNTER — THERAPY VISIT (OUTPATIENT)
Dept: PHYSICAL THERAPY | Facility: HOSPITAL | Age: 24
End: 2024-04-25
Attending: NURSE PRACTITIONER
Payer: COMMERCIAL

## 2024-04-25 DIAGNOSIS — N94.2 VAGINISMUS: Primary | ICD-10-CM

## 2024-04-25 PROCEDURE — 97140 MANUAL THERAPY 1/> REGIONS: CPT | Mod: GP

## 2024-05-22 ENCOUNTER — ALLIED HEALTH/NURSE VISIT (OUTPATIENT)
Dept: OBGYN | Facility: OTHER | Age: 24
End: 2024-05-22
Attending: NURSE PRACTITIONER
Payer: COMMERCIAL

## 2024-05-22 DIAGNOSIS — Z30.9 CONTRACEPTION MANAGEMENT: Primary | ICD-10-CM

## 2024-05-22 PROCEDURE — 96372 THER/PROPH/DIAG INJ SC/IM: CPT | Performed by: NURSE PRACTITIONER

## 2024-05-22 PROCEDURE — 250N000011 HC RX IP 250 OP 636: Mod: JZ | Performed by: NURSE PRACTITIONER

## 2024-05-22 RX ADMIN — MEDROXYPROGESTERONE ACETATE 150 MG: 150 INJECTION, SUSPENSION INTRAMUSCULAR at 11:20

## 2024-05-22 NOTE — PROGRESS NOTES
Clinic Administered Medication Documentation      Depo Provera Documentation    Depo-Provera Standing Order inclusion/exclusion criteria reviewed.     Is this the initial or subsequent dose of Depo Provera? Subsequent dose - patient is within the acceptable window of time (11-15 weeks) for subsequent injection. Pregnancy test not indicated.    Patient meets: inclusion criteria     Is there an active order (written within the past 365 days, with administrations remaining, not ) in the chart? Yes.     Prior to injection, verified patient identity using patient's name and date of birth. Medication was administered. Please see MAR and medication order for additional information.     Vial/Syringe: Single dose vial. Was entire vial of medication used? Yes    Patient instructed to remain in clinic for 15 minutes and report any adverse reaction to staff immediately.  NEXT INJECTION DUE: 24 - 24    Verified that the patient has refills remaining in their prescription.   Tracie Baum LPN

## 2024-05-24 NOTE — PROGRESS NOTES
Assessment & Plan     (F90.9) Attention deficit hyperactivity disorder (ADHD), unspecified ADHD type  (primary encounter diagnosis)  Plan: amphetamine-dextroamphetamine (ADDERALL XR) 15         MG 24 hr capsule, amphetamine-dextroamphetamine        (ADDERALL XR) 15 MG 24 hr capsule,         amphetamine-dextroamphetamine (ADDERALL XR) 15         MG 24 hr capsule        Pt shows no signs of diversion or abuse. Up to 3 month of his/her ADD/ADHD medications given today. Pt is aware that he/she is solely responsible for protections of the prescriptions. I will not tolerate any lost or stolen prescriptions lightly. Will see pt back in office in 3 months. Stimulant agreement and drug screen up to date.       (F41.9) Anxiety  Plan: Much improved with Lexapro 5 mg. Will continue. Will also continue f/up with psychiatry.     (E66.9) Obesity, Class II, BMI 35-39.9, no comorbidity  Comment: BMI 35  Plan: Diet and exercise encouraged.     The longitudinal plan of care for the diagnosis(es)/condition(s) as documented were addressed during this visit. Due to the added complexity in care, I will continue to support Radha in the subsequent management and with ongoing continuity of care.      Return in about 3 months (around 8/28/2024).    Devon Garcia is a 24 year old, presenting for the following health issues:  A.D.H.D    History of Present Illness       Mental Health Follow-up:  Patient presents to follow-up on Depression & Anxiety.Patient's depression since last visit has been:  Better  The patient is not having other symptoms associated with depression.  Patient's anxiety since last visit has been:  Better  The patient is not having other symptoms associated with anxiety.  Any significant life events: financial concerns  Patient is not feeling anxious or having panic attacks.  Patient has no concerns about alcohol or drug use.    Reason for visit:  Check up    Radha eats 2-3 servings of fruits and vegetables  daily.Radha consumes 2 sweetened beverage(s) daily.Radha exercises with enough effort to increase Radha's heart rate 20 to 29 minutes per day.  Radha exercises with enough effort to increase Radha's heart rate 7 days per week.   Radha is taking medications regularly.       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      -She does have some anxiety and PTSD. Seeing a counselor at [facility]. Feels this is helping. No thoughts of suicide or homicide. Seeing Jennifer Carranza in [place], [place] for anxiety and states that it is going well. Was also recently started on Lexapro 5 mg. Feels this is working well. No side effects.      Drug screen appropriate in 8/2023. Stimulant agreement done in 8/2023.            Review of Systems  Constitutional, HEENT, cardiovascular, pulmonary, gi and gu systems are negative, except as otherwise noted.      Objective    /80 (BP Location: Right arm, Patient Position: Sitting, Cuff Size: Adult Large)   Pulse 98   Temp 98.9  F (37.2  C) (Tympanic)   Resp 16   Wt 91 kg (200 lb 9.6 oz)   SpO2 95%   BMI 35.53 kg/m    Body mass index is 35.53 kg/m .  Physical Exam   GENERAL: alert, no distress, and obese  EYES: Eyes grossly normal to inspection, PERRL and conjunctivae and sclerae normal  HENT: ear canals and TM's normal, nose and mouth without ulcers or lesions  NECK: no adenopathy, no asymmetry, masses, or scars  RESP: lungs clear to auscultation - no rales, rhonchi or wheezes  CV: regular rate and rhythm, no murmur, click or rub, no peripheral edema  NEURO: Normal strength and tone, mentation intact and speech normal  PSYCH: mentation appears normal, affect  normal/bright            Signed Electronically by: Lissy Perez, NP

## 2024-05-28 ENCOUNTER — OFFICE VISIT (OUTPATIENT)
Dept: FAMILY MEDICINE | Facility: OTHER | Age: 24
End: 2024-05-28
Attending: NURSE PRACTITIONER
Payer: COMMERCIAL

## 2024-05-28 ENCOUNTER — THERAPY VISIT (OUTPATIENT)
Dept: PHYSICAL THERAPY | Facility: HOSPITAL | Age: 24
End: 2024-05-28
Attending: NURSE PRACTITIONER
Payer: COMMERCIAL

## 2024-05-28 VITALS
TEMPERATURE: 98.9 F | WEIGHT: 200.6 LBS | HEART RATE: 98 BPM | OXYGEN SATURATION: 95 % | BODY MASS INDEX: 35.53 KG/M2 | RESPIRATION RATE: 16 BRPM | SYSTOLIC BLOOD PRESSURE: 124 MMHG | DIASTOLIC BLOOD PRESSURE: 80 MMHG

## 2024-05-28 DIAGNOSIS — N94.2 VAGINISMUS: Primary | ICD-10-CM

## 2024-05-28 DIAGNOSIS — F90.9 ATTENTION DEFICIT HYPERACTIVITY DISORDER (ADHD), UNSPECIFIED ADHD TYPE: Primary | ICD-10-CM

## 2024-05-28 DIAGNOSIS — M62.89 PELVIC FLOOR DYSFUNCTION IN FEMALE: ICD-10-CM

## 2024-05-28 DIAGNOSIS — E66.812 OBESITY, CLASS II, BMI 35-39.9, NO COMORBIDITY: ICD-10-CM

## 2024-05-28 DIAGNOSIS — F41.9 ANXIETY: ICD-10-CM

## 2024-05-28 PROCEDURE — G0463 HOSPITAL OUTPT CLINIC VISIT: HCPCS | Mod: 25

## 2024-05-28 PROCEDURE — 97530 THERAPEUTIC ACTIVITIES: CPT | Mod: GP

## 2024-05-28 PROCEDURE — 99213 OFFICE O/P EST LOW 20 MIN: CPT | Performed by: NURSE PRACTITIONER

## 2024-05-28 PROCEDURE — 97140 MANUAL THERAPY 1/> REGIONS: CPT | Mod: GP

## 2024-05-28 PROCEDURE — G2211 COMPLEX E/M VISIT ADD ON: HCPCS | Performed by: NURSE PRACTITIONER

## 2024-05-28 PROCEDURE — G0463 HOSPITAL OUTPT CLINIC VISIT: HCPCS

## 2024-05-28 RX ORDER — ESCITALOPRAM OXALATE 5 MG/1
5 TABLET ORAL DAILY
COMMUNITY
Start: 2024-04-08 | End: 2024-05-28

## 2024-05-28 RX ORDER — DEXTROAMPHETAMINE SACCHARATE, AMPHETAMINE ASPARTATE MONOHYDRATE, DEXTROAMPHETAMINE SULFATE AND AMPHETAMINE SULFATE 3.75; 3.75; 3.75; 3.75 MG/1; MG/1; MG/1; MG/1
15 CAPSULE, EXTENDED RELEASE ORAL DAILY
Qty: 30 CAPSULE | Refills: 0 | Status: SHIPPED | OUTPATIENT
Start: 2024-08-02 | End: 2024-09-04

## 2024-05-28 RX ORDER — DEXTROAMPHETAMINE SACCHARATE, AMPHETAMINE ASPARTATE MONOHYDRATE, DEXTROAMPHETAMINE SULFATE AND AMPHETAMINE SULFATE 3.75; 3.75; 3.75; 3.75 MG/1; MG/1; MG/1; MG/1
15 CAPSULE, EXTENDED RELEASE ORAL DAILY
Qty: 30 CAPSULE | Refills: 0 | Status: SHIPPED | OUTPATIENT
Start: 2024-06-07 | End: 2024-09-04

## 2024-05-28 RX ORDER — ESCITALOPRAM OXALATE 5 MG/1
5 TABLET ORAL DAILY
COMMUNITY
Start: 2024-05-28

## 2024-05-28 RX ORDER — DEXTROAMPHETAMINE SACCHARATE, AMPHETAMINE ASPARTATE MONOHYDRATE, DEXTROAMPHETAMINE SULFATE AND AMPHETAMINE SULFATE 3.75; 3.75; 3.75; 3.75 MG/1; MG/1; MG/1; MG/1
15 CAPSULE, EXTENDED RELEASE ORAL DAILY
Qty: 30 CAPSULE | Refills: 0 | Status: SHIPPED | OUTPATIENT
Start: 2024-07-05 | End: 2024-09-04

## 2024-05-28 ASSESSMENT — ANXIETY QUESTIONNAIRES
GAD7 TOTAL SCORE: 7
8. IF YOU CHECKED OFF ANY PROBLEMS, HOW DIFFICULT HAVE THESE MADE IT FOR YOU TO DO YOUR WORK, TAKE CARE OF THINGS AT HOME, OR GET ALONG WITH OTHER PEOPLE?: SOMEWHAT DIFFICULT
7. FEELING AFRAID AS IF SOMETHING AWFUL MIGHT HAPPEN: SEVERAL DAYS
GAD7 TOTAL SCORE: 7
3. WORRYING TOO MUCH ABOUT DIFFERENT THINGS: SEVERAL DAYS
4. TROUBLE RELAXING: SEVERAL DAYS
7. FEELING AFRAID AS IF SOMETHING AWFUL MIGHT HAPPEN: SEVERAL DAYS
1. FEELING NERVOUS, ANXIOUS, OR ON EDGE: SEVERAL DAYS
5. BEING SO RESTLESS THAT IT IS HARD TO SIT STILL: SEVERAL DAYS
GAD7 TOTAL SCORE: 7
6. BECOMING EASILY ANNOYED OR IRRITABLE: SEVERAL DAYS
2. NOT BEING ABLE TO STOP OR CONTROL WORRYING: SEVERAL DAYS
IF YOU CHECKED OFF ANY PROBLEMS ON THIS QUESTIONNAIRE, HOW DIFFICULT HAVE THESE PROBLEMS MADE IT FOR YOU TO DO YOUR WORK, TAKE CARE OF THINGS AT HOME, OR GET ALONG WITH OTHER PEOPLE: SOMEWHAT DIFFICULT

## 2024-05-28 ASSESSMENT — PAIN SCALES - GENERAL: PAINLEVEL: NO PAIN (0)

## 2024-06-10 DIAGNOSIS — J30.2 SEASONAL ALLERGIC RHINITIS, UNSPECIFIED TRIGGER: Primary | ICD-10-CM

## 2024-06-10 RX ORDER — CETIRIZINE HYDROCHLORIDE 10 MG/1
10 TABLET ORAL DAILY
Qty: 30 TABLET | Refills: 0 | Status: SHIPPED | OUTPATIENT
Start: 2024-06-10 | End: 2024-07-11

## 2024-06-10 NOTE — TELEPHONE ENCOUNTER
Zyrtec 10 mg       Last Written Prescription Date:  Historical medication   Last Office Visit: 5/28/24  Future Office visit:    Next 5 appointments (look out 90 days)      Aug 07, 2024 11:00 AM  (Arrive by 10:45 AM)  Nurse Only with HC OB/GYN NURSE  [facility] ([facility] ) [address] [place] [address]  [phone]     Sep 04, 2024 11:30 AM  (Arrive by 11:15 AM)  Provider Visit with Lissy Perez NP  [facility] ([facility] [address] [facility]  [facility] [address]  [phone]             Routing refill request to provider for review/approval because:  Medication is reported/historical

## 2024-07-05 ENCOUNTER — THERAPY VISIT (OUTPATIENT)
Dept: PHYSICAL THERAPY | Facility: HOSPITAL | Age: 24
End: 2024-07-05
Attending: NURSE PRACTITIONER
Payer: COMMERCIAL

## 2024-07-05 DIAGNOSIS — N94.2 VAGINISMUS: Primary | ICD-10-CM

## 2024-07-05 PROCEDURE — 97530 THERAPEUTIC ACTIVITIES: CPT | Mod: GP

## 2024-07-05 PROCEDURE — 97140 MANUAL THERAPY 1/> REGIONS: CPT | Mod: GP

## 2024-07-06 ENCOUNTER — HEALTH MAINTENANCE LETTER (OUTPATIENT)
Age: 24
End: 2024-07-06

## 2024-07-11 DIAGNOSIS — J30.2 SEASONAL ALLERGIC RHINITIS, UNSPECIFIED TRIGGER: ICD-10-CM

## 2024-07-11 RX ORDER — CETIRIZINE HYDROCHLORIDE 10 MG/1
10 TABLET ORAL DAILY
Qty: 30 TABLET | Refills: 2 | Status: SHIPPED | OUTPATIENT
Start: 2024-07-11 | End: 2024-10-04

## 2024-08-07 ENCOUNTER — ALLIED HEALTH/NURSE VISIT (OUTPATIENT)
Dept: OBGYN | Facility: OTHER | Age: 24
End: 2024-08-07
Payer: COMMERCIAL

## 2024-08-07 DIAGNOSIS — Z30.9 CONTRACEPTION MANAGEMENT: Primary | ICD-10-CM

## 2024-08-07 PROCEDURE — 96372 THER/PROPH/DIAG INJ SC/IM: CPT | Performed by: NURSE PRACTITIONER

## 2024-08-07 PROCEDURE — 250N000011 HC RX IP 250 OP 636: Mod: JZ | Performed by: NURSE PRACTITIONER

## 2024-08-07 RX ADMIN — MEDROXYPROGESTERONE ACETATE 150 MG: 150 INJECTION, SUSPENSION INTRAMUSCULAR at 10:52

## 2024-08-07 NOTE — PROGRESS NOTES
Clinic Administered Medication Documentation      Depo Provera Documentation    Depo-Provera Standing Order inclusion/exclusion criteria reviewed.    Is this the initial or subsequent dose of Depo Provera? Subsequent dose - patient is within the acceptable window of time (11-15 weeks) for subsequent injection. Pregnancy test not indicated.    Patient meets: inclusion criteria     Is there an active order (written within the past 365 days, with administrations remaining, not ) in the chart? Yes.     Prior to injection, verified patient identity using patient's name and date of birth. Medication was administered. Please see MAR and medication order for additional information.     Vial/Syringe: Single dose vial. Was entire vial of medication used? Yes    Patient instructed to remain in clinic for 15 minutes and report any adverse reaction to staff immediately.  NEXT INJECTION DUE: 10/23/24 - 24    Verified that the patient has refills remaining in their prescription.

## 2024-08-28 NOTE — PROGRESS NOTES
"  Assessment & Plan     (F90.9) Attention deficit hyperactivity disorder (ADHD), unspecified ADHD type  (primary encounter diagnosis)  Plan: Drug Confirmation Panel Urine with Creat,         amphetamine-dextroamphetamine (ADDERALL XR) 15         MG 24 hr capsule, amphetamine-dextroamphetamine        (ADDERALL XR) 15 MG 24 hr capsule,         amphetamine-dextroamphetamine (ADDERALL XR) 15         MG 24 hr capsule          Pt shows no signs of diversion or abuse. Up to 3 month of his/her ADD/ADHD medications given today. Pt is aware that he/she is solely responsible for protections of the prescriptions. I will not tolerate any lost or stolen prescriptions lightly. Will see pt back in office in 3 months. Drug screen ordered today.       (F41.9) Anxiety  Plan: Controlled with counseling and Lexapro 5 mg. Will continue.     (L30.9) Dermatitis  Comment: Rash appears like dermatitis, Pruritic  Plan: triamcinolone (KENALOG) 0.1 % external ointment        Will try Kenalog cream. Will avoid using more than 2 weeks. If rash does not resolve, she will let me know.           BMI  Estimated body mass index is 36.85 kg/m  as calculated from the following:    Height as of this encounter: 1.6 m (5' 3\").    Weight as of this encounter: 94.3 kg (208 lb).   Weight management plan: Discussed healthy diet and exercise guidelines        Devon Garcia is a 24 year old, presenting for the following health issues:  A.D.H.D    History of Present Illness       Reason for visit:  Adderall check up    Radha eats 2-3 servings of fruits and vegetables daily.Radha consumes 2 sweetened beverage(s) daily.Radha exercises with enough effort to increase Radha's heart rate 10 to 19 minutes per day.  Radha exercises with enough effort to increase Radha's heart rate 7 days per week.   Radha is taking medications regularly.       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      -She does have some anxiety and PTSD. Seeing a counselor at [facility]. Feels this is helping. Also on Lexapro 5 mg. No side effects. No thoughts of suicide or homicide. Seeing Jennifer Carranza in [place], [place] for anxiety and states that it is going well.      Due for a drug screen. Last took her Adderall this morning.    She also has a pruritic rash on her pinky and ringer finger. Comes and goes. She has tried applying Neosporin and Aloe Vera without any relief.       Review of Systems  Constitutional, HEENT, cardiovascular, pulmonary, gi and gu systems are negative, except as otherwise noted.      Objective    /80   Pulse 102   Temp 98.1  F (36.7  C) (Tympanic)   Resp 20   Ht 1.6 m (5' 3\")   Wt 94.3 kg (208 lb)   SpO2 98%   BMI 36.85 kg/m    Body mass index is 36.85 kg/m .  Physical Exam   GENERAL: alert and no distress, obese  RESP: lungs clear to auscultation - no rales, rhonchi or wheezes  CV: regular rate and rhythm, no murmur, click or rub, no peripheral edema  MS: no gross musculoskeletal defects noted, no edema  SKIN: patient with erythematous papular rash on right pinky and ring finger  NEURO: Normal strength and tone, mentation intact and speech normal  PSYCH: mentation appears normal, affect normal/bright    Drug screen in process        Signed Electronically by: Lissy Perez NP    "

## 2024-09-04 ENCOUNTER — OFFICE VISIT (OUTPATIENT)
Dept: FAMILY MEDICINE | Facility: OTHER | Age: 24
End: 2024-09-04
Attending: NURSE PRACTITIONER
Payer: COMMERCIAL

## 2024-09-04 VITALS
HEART RATE: 102 BPM | BODY MASS INDEX: 36.86 KG/M2 | HEIGHT: 63 IN | RESPIRATION RATE: 20 BRPM | TEMPERATURE: 98.1 F | SYSTOLIC BLOOD PRESSURE: 120 MMHG | WEIGHT: 208 LBS | DIASTOLIC BLOOD PRESSURE: 80 MMHG | OXYGEN SATURATION: 98 %

## 2024-09-04 DIAGNOSIS — Z23 ENCOUNTER FOR IMMUNIZATION: ICD-10-CM

## 2024-09-04 DIAGNOSIS — L30.9 DERMATITIS: ICD-10-CM

## 2024-09-04 DIAGNOSIS — F41.9 ANXIETY: ICD-10-CM

## 2024-09-04 DIAGNOSIS — F90.9 ATTENTION DEFICIT HYPERACTIVITY DISORDER (ADHD), UNSPECIFIED ADHD TYPE: Primary | ICD-10-CM

## 2024-09-04 LAB — CREAT UR-MCNC: 150 MG/DL

## 2024-09-04 PROCEDURE — 80367 DRUG SCREENING PROPOXYPHENE: CPT | Mod: ZL | Performed by: NURSE PRACTITIONER

## 2024-09-04 PROCEDURE — 90471 IMMUNIZATION ADMIN: CPT

## 2024-09-04 PROCEDURE — 90662 IIV NO PRSV INCREASED AG IM: CPT

## 2024-09-04 PROCEDURE — G2211 COMPLEX E/M VISIT ADD ON: HCPCS | Performed by: NURSE PRACTITIONER

## 2024-09-04 PROCEDURE — 99214 OFFICE O/P EST MOD 30 MIN: CPT | Performed by: NURSE PRACTITIONER

## 2024-09-04 PROCEDURE — 90686 IIV4 VACC NO PRSV 0.5 ML IM: CPT

## 2024-09-04 PROCEDURE — 90686 IIV4 VACC NO PRSV 0.5 ML IM: CPT | Performed by: NURSE PRACTITIONER

## 2024-09-04 PROCEDURE — 80365 DRUG SCREENING OXYCODONE: CPT | Mod: ZL | Performed by: NURSE PRACTITIONER

## 2024-09-04 PROCEDURE — G0463 HOSPITAL OUTPT CLINIC VISIT: HCPCS

## 2024-09-04 RX ORDER — DEXTROAMPHETAMINE SACCHARATE, AMPHETAMINE ASPARTATE MONOHYDRATE, DEXTROAMPHETAMINE SULFATE AND AMPHETAMINE SULFATE 3.75; 3.75; 3.75; 3.75 MG/1; MG/1; MG/1; MG/1
15 CAPSULE, EXTENDED RELEASE ORAL DAILY
Qty: 30 CAPSULE | Refills: 0 | Status: SHIPPED | OUTPATIENT
Start: 2024-10-04

## 2024-09-04 RX ORDER — DEXTROAMPHETAMINE SACCHARATE, AMPHETAMINE ASPARTATE MONOHYDRATE, DEXTROAMPHETAMINE SULFATE AND AMPHETAMINE SULFATE 3.75; 3.75; 3.75; 3.75 MG/1; MG/1; MG/1; MG/1
15 CAPSULE, EXTENDED RELEASE ORAL DAILY
Qty: 30 CAPSULE | Refills: 0 | Status: SHIPPED | OUTPATIENT
Start: 2024-09-05

## 2024-09-04 RX ORDER — DEXTROAMPHETAMINE SACCHARATE, AMPHETAMINE ASPARTATE MONOHYDRATE, DEXTROAMPHETAMINE SULFATE AND AMPHETAMINE SULFATE 3.75; 3.75; 3.75; 3.75 MG/1; MG/1; MG/1; MG/1
15 CAPSULE, EXTENDED RELEASE ORAL DAILY
Qty: 30 CAPSULE | Refills: 0 | Status: SHIPPED | OUTPATIENT
Start: 2024-11-01

## 2024-09-04 RX ORDER — TRIAMCINOLONE ACETONIDE 1 MG/G
OINTMENT TOPICAL 2 TIMES DAILY
Qty: 30 G | Refills: 0 | Status: SHIPPED | OUTPATIENT
Start: 2024-09-04

## 2024-09-04 ASSESSMENT — PAIN SCALES - GENERAL: PAINLEVEL: NO PAIN (0)

## 2024-09-05 PROCEDURE — 90662 IIV NO PRSV INCREASED AG IM: CPT | Performed by: NURSE PRACTITIONER

## 2024-09-09 LAB
AMPHET UR CFM-MCNC: ABNORMAL NG/ML
AMPHET/CREAT UR: ABNORMAL

## 2024-10-04 DIAGNOSIS — J30.2 SEASONAL ALLERGIC RHINITIS, UNSPECIFIED TRIGGER: ICD-10-CM

## 2024-10-04 RX ORDER — CETIRIZINE HYDROCHLORIDE 10 MG/1
10 TABLET ORAL DAILY
Qty: 30 TABLET | Refills: 2 | Status: SHIPPED | OUTPATIENT
Start: 2024-10-04

## 2024-10-24 ENCOUNTER — OFFICE VISIT (OUTPATIENT)
Dept: OBGYN | Facility: OTHER | Age: 24
End: 2024-10-24
Attending: NURSE PRACTITIONER
Payer: COMMERCIAL

## 2024-10-24 VITALS
SYSTOLIC BLOOD PRESSURE: 118 MMHG | HEART RATE: 116 BPM | BODY MASS INDEX: 36.73 KG/M2 | WEIGHT: 207.3 LBS | OXYGEN SATURATION: 96 % | DIASTOLIC BLOOD PRESSURE: 84 MMHG | RESPIRATION RATE: 18 BRPM | HEIGHT: 63 IN

## 2024-10-24 DIAGNOSIS — N92.1 BREAKTHROUGH BLEEDING ON DEPO PROVERA: Primary | ICD-10-CM

## 2024-10-24 LAB
BACTERIAL VAGINOSIS VAG-IMP: NEGATIVE
CANDIDA DNA VAG QL NAA+PROBE: NOT DETECTED
CANDIDA GLABRATA / CANDIDA KRUSEI DNA: NOT DETECTED
T VAGINALIS DNA VAG QL NAA+PROBE: NOT DETECTED

## 2024-10-24 PROCEDURE — 250N000011 HC RX IP 250 OP 636: Mod: JZ | Performed by: NURSE PRACTITIONER

## 2024-10-24 PROCEDURE — G0463 HOSPITAL OUTPT CLINIC VISIT: HCPCS | Mod: 25 | Performed by: NURSE PRACTITIONER

## 2024-10-24 PROCEDURE — 99213 OFFICE O/P EST LOW 20 MIN: CPT | Performed by: NURSE PRACTITIONER

## 2024-10-24 PROCEDURE — 0352U MULTIPLEX VAGINAL PANEL BY PCR: CPT | Mod: ZL | Performed by: NURSE PRACTITIONER

## 2024-10-24 PROCEDURE — 96372 THER/PROPH/DIAG INJ SC/IM: CPT | Performed by: NURSE PRACTITIONER

## 2024-10-24 RX ADMIN — MEDROXYPROGESTERONE ACETATE 150 MG: 150 INJECTION, SUSPENSION INTRAMUSCULAR at 13:07

## 2024-10-24 ASSESSMENT — PAIN SCALES - GENERAL: PAINLEVEL_OUTOF10: NO PAIN (0)

## 2024-10-24 NOTE — PROGRESS NOTES
"[facility]                HPI     Here for concerns of one month of light spotting on depo provera.  Periods significantly improved with depo and current bleeding is light and no cramping.  She is sexually active with a trans male.  She declines STI screening but would like to do a screening for BV and yeast. Due for injection today.              Medications:     Current Outpatient Medications   Medication Sig Dispense Refill    amphetamine-dextroamphetamine (ADDERALL XR) 15 MG 24 hr capsule Take 1 capsule (15 mg) by mouth daily. Do not start before September 5, 2024. 30 capsule 0    amphetamine-dextroamphetamine (ADDERALL XR) 15 MG 24 hr capsule Take 1 capsule (15 mg) by mouth daily. Do not start before October 4, 2024. 30 capsule 0    [START ON 11/1/2024] amphetamine-dextroamphetamine (ADDERALL XR) 15 MG 24 hr capsule Take 1 capsule (15 mg) by mouth daily. Do not start before November 1, 2024. 30 capsule 0    cetirizine (ZYRTEC) 10 MG tablet TAKE 1 TABLET BY MOUTH DAILY 30 tablet 2    escitalopram (LEXAPRO) 5 MG tablet Take 1 tablet (5 mg) by mouth daily      triamcinolone (KENALOG) 0.1 % external ointment Apply topically 2 times daily. 30 g 0     Current Facility-Administered Medications   Medication Dose Route Frequency Provider Last Rate Last Admin    medroxyPROGESTERone (DEPO-PROVERA) injection 150 mg  150 mg Intramuscular Q90 Days Cassie Wilson NP   150 mg at 10/24/24 1307                Allergies:   No clinical screening - see comments, Oxcarbazepine, and Chlorine         Review of Systems:     The 5 point Review of Systems is negative other than noted in the HPI                     Physical Exam:   Blood pressure 118/84, pulse 116, resp. rate 18, height 1.6 m (5' 3\"), weight 94 kg (207 lb 4.8 oz), SpO2 96%.  Constitutional:   awake, alert, cooperative, no apparent distress, and appears stated age     Genitounirinary:   External Genitalia:  General appearance; normal, Hair distribution; " normal, Lesions absent  Vagina:  Lesions absent              Data:     Results for orders placed or performed in visit on 10/24/24   Multiplex Vaginal Panel by PCR     Status: Normal    Specimen: Vagina; Swab   Result Value Ref Range    Bacterial Vaginosis Organism DNA Negative Negative    Candida Group DNA Not Detected Not Detected    Candida glabrata / Montserrat krusei DNA Not Detected Not Detected    Trichomonas vaginalis DNA Not Detected Not Detected    Narrative    The Xpert  Xpress MVP test, performed on the [employer], is an automated, qualitative in vitro diagnostic test for the detection of DNA targets from anaerobic bacteria associated with bacterial vaginosis, Candida species associated with vulvovaginal candidiasis, and Trichomonas vaginalis. The assay uses clinician-collected and self-collected vaginal swabs from patients who are symptomatic for vaginitis/ vaginosis. The Xpert  Xpress MVP test utilizes real-time polymerase chain reaction (PCR) for the amplification of specific DNA targets and utilizes fluorogenic target-specific hybridization probes to detect and differentiate DNA. It is intended to aid in the diagnosis of vaginal infections in women with a clinical presentation consistent with bacterial vaginosis, vulvovaginal candidiasis, or trichomoniasis.   The assay targets three anaerobic microorgansims that are associated with bacterial vaginosis (BV). Other organisms that are not detected by the Xpert  Xpress MVP test have also been reported to be associated with BV. The BV organism and Candida species targets of the Xpert  Xpress MVP test can be commensal in women; positive results must be considered in conjunction with other clinical and patient information to determine the disease status.              Assessment and Plan:     Breakthrough bleeding with depo Provera - negative swab.  If bleeding continues may consider subsequent injections 2-3 weeks early.    Depo Provera  given.      Cassie Wilson NP, [facility]  10/24/2024  1:50 PM

## 2024-12-02 NOTE — PROGRESS NOTES
Assessment & Plan     (F90.9) Attention deficit hyperactivity disorder (ADHD), unspecified ADHD type  (primary encounter diagnosis)  Plan: amphetamine-dextroamphetamine (ADDERALL XR) 15         MG 24 hr capsule, amphetamine-dextroamphetamine        (ADDERALL XR) 15 MG 24 hr capsule,         amphetamine-dextroamphetamine (ADDERALL XR) 15         MG 24 hr capsule        Pt shows no signs of diversion or abuse. Up to 3 month of his/her ADD/ADHD medications given today. Pt is aware that he/she is solely responsible for protections of the prescriptions. I will not tolerate any lost or stolen prescriptions lightly. Will see pt back in office in 3 months. Drug screen up to date.        (F41.9) Anxiety  Comment: controlled  Plan: C/W Lexapro and seeing therapist.     (E66.522) Obesity, Class II, BMI 35-39.9, no comorbidity  (Z86.59) Hx of anorexia nervosa  Comment: struggling to loose weight, wanting thyroid checked  Plan: Adult Nutrition  Referral        Labs ordered. Will also place nutrition consult. Diet and exercise encouraged.     (J30.2) Seasonal allergic rhinitis, unspecified trigger  Comment: controlled  Plan: cetirizine (ZYRTEC) 10 MG tablet        C/W Zyrtec.       The longitudinal plan of care for the diagnosis(es)/condition(s) as documented were addressed during this visit. Due to the added complexity in care, I will continue to support Radha in the subsequent management and with ongoing continuity of care.    Devon Garcia is a 24 year old, presenting for the following health issues:  A.D.H.D    History of Present Illness       Mental Health Follow-up:  Patient presents to follow-up on Depression & Anxiety.Patient's depression since last visit has been:  Better  The patient is not having other symptoms associated with depression.  Patient's anxiety since last visit has been:  Better  The patient is not having other symptoms associated with anxiety.  Any significant life events: financial  "concerns, housing concerns and health concerns  Patient is not feeling anxious or having panic attacks.  Patient has no concerns about alcohol or drug use.    Reason for visit:  General visit    Radha eats 0-1 servings of fruits and vegetables daily.Radha consumes 2 sweetened beverage(s) daily.Radha exercises with enough effort to increase Radha's heart rate 30 to 60 minutes per day.  Radha exercises with enough effort to increase Radha's heart rate 7 days per week.   Radha is taking medications regularly.       ADHD  Medication Followup - Adderall XR 15 mg once daily   Taking Medication as prescribed: yes  Side Effects:  None, no chest pain, shortness of breath, dizziness, syncope, insomnia, or palpitations; no weight loss   Medication Helping Symptoms:  yes, able to stay on task and focus better with the medication   Works at [employer] for [employer] for rosa isela.      -She does have some anxiety and PTSD. Seeing a counselor at [facility]. Feels this is helping. Also on Lexapro 5 mg. No side effects. No thoughts of suicide or homicide. Seeing Jennifer Carranza in [place], [place] for anxiety and states that it is going well.     She would like her TSH checked today. Rosa Isela had thyroid issues and she is struggling to low weight.     Diet Recall;   Breakfast: pancakes, cereal  Lunch: skips  Dinner: take-out, pizza, chicken and rice    Seasonal allergies controlled, would like her Zyrtec refilled.     Review of Systems  Constitutional, HEENT, cardiovascular, pulmonary, gi and gu systems are negative, except as otherwise noted.      Objective    /80   Pulse 101   Temp 98.9  F (37.2  C) (Tympanic)   Resp 17   Ht 1.6 m (5' 3\")   Wt 96.8 kg (213 lb 8 oz)   SpO2 98%   BMI 37.82 kg/m    Body mass index is 37.82 kg/m .  Physical Exam   GENERAL: alert and no distress, obese  NECK: no adenopathy, no asymmetry, masses, or scars  RESP: lungs clear to auscultation - no rales, rhonchi or wheezes  CV: regular rate and " rhythm, no murmur, click or rub, no peripheral edema  ABDOMEN: soft, nontender, obese, no masses and bowel sounds normal  NEURO: Normal strength and tone, mentation intact and speech normal  PSYCH: mentation appears normal, affect normal/bright    Labs in process        Signed Electronically by: Lissy Perez NP

## 2024-12-04 ENCOUNTER — OFFICE VISIT (OUTPATIENT)
Dept: FAMILY MEDICINE | Facility: OTHER | Age: 24
End: 2024-12-04
Attending: NURSE PRACTITIONER
Payer: COMMERCIAL

## 2024-12-04 VITALS
SYSTOLIC BLOOD PRESSURE: 110 MMHG | OXYGEN SATURATION: 98 % | DIASTOLIC BLOOD PRESSURE: 80 MMHG | HEART RATE: 101 BPM | WEIGHT: 213.5 LBS | TEMPERATURE: 98.9 F | BODY MASS INDEX: 37.83 KG/M2 | RESPIRATION RATE: 17 BRPM | HEIGHT: 63 IN

## 2024-12-04 DIAGNOSIS — E66.812 OBESITY, CLASS II, BMI 35-39.9, NO COMORBIDITY: ICD-10-CM

## 2024-12-04 DIAGNOSIS — J30.2 SEASONAL ALLERGIC RHINITIS, UNSPECIFIED TRIGGER: ICD-10-CM

## 2024-12-04 DIAGNOSIS — Z86.59 HX OF ANOREXIA NERVOSA: ICD-10-CM

## 2024-12-04 DIAGNOSIS — F90.9 ATTENTION DEFICIT HYPERACTIVITY DISORDER (ADHD), UNSPECIFIED ADHD TYPE: Primary | ICD-10-CM

## 2024-12-04 DIAGNOSIS — F41.9 ANXIETY: ICD-10-CM

## 2024-12-04 LAB
ALBUMIN SERPL BCG-MCNC: 4.4 G/DL (ref 3.5–5.2)
ALP SERPL-CCNC: 131 U/L (ref 40–150)
ALT SERPL W P-5'-P-CCNC: 16 U/L (ref 0–70)
ANION GAP SERPL CALCULATED.3IONS-SCNC: 12 MMOL/L (ref 7–15)
AST SERPL W P-5'-P-CCNC: 23 U/L (ref 0–45)
BASOPHILS # BLD AUTO: 0.1 10E3/UL (ref 0–0.2)
BASOPHILS NFR BLD AUTO: 1 %
BILIRUB SERPL-MCNC: 0.6 MG/DL
BUN SERPL-MCNC: 7.3 MG/DL (ref 6–20)
CALCIUM SERPL-MCNC: 9.3 MG/DL (ref 8.8–10.4)
CHLORIDE SERPL-SCNC: 105 MMOL/L (ref 98–107)
CREAT SERPL-MCNC: 0.7 MG/DL (ref 0.51–1.17)
EGFRCR SERPLBLD CKD-EPI 2021: >90 ML/MIN/1.73M2
EOSINOPHIL # BLD AUTO: 0.3 10E3/UL (ref 0–0.7)
EOSINOPHIL NFR BLD AUTO: 2 %
ERYTHROCYTE [DISTWIDTH] IN BLOOD BY AUTOMATED COUNT: 13.7 % (ref 10–15)
GLUCOSE SERPL-MCNC: 94 MG/DL (ref 70–99)
HCO3 SERPL-SCNC: 23 MMOL/L (ref 22–29)
HCT VFR BLD AUTO: 42.4 % (ref 35–53)
HGB BLD-MCNC: 14.6 G/DL (ref 11.7–17.7)
IMM GRANULOCYTES # BLD: 0 10E3/UL
IMM GRANULOCYTES NFR BLD: 0 %
LYMPHOCYTES # BLD AUTO: 4.1 10E3/UL (ref 0.8–5.3)
LYMPHOCYTES NFR BLD AUTO: 38 %
MCH RBC QN AUTO: 29 PG (ref 26.5–33)
MCHC RBC AUTO-ENTMCNC: 34.4 G/DL (ref 31.5–36.5)
MCV RBC AUTO: 84 FL (ref 78–100)
MONOCYTES # BLD AUTO: 0.7 10E3/UL (ref 0–1.3)
MONOCYTES NFR BLD AUTO: 7 %
NEUTROPHILS # BLD AUTO: 5.7 10E3/UL (ref 1.6–8.3)
NEUTROPHILS NFR BLD AUTO: 53 %
NRBC # BLD AUTO: 0 10E3/UL
NRBC BLD AUTO-RTO: 0 /100
PLATELET # BLD AUTO: 462 10E3/UL (ref 150–450)
POTASSIUM SERPL-SCNC: 3.7 MMOL/L (ref 3.4–5.3)
PROT SERPL-MCNC: 7.7 G/DL (ref 6.4–8.3)
RBC # BLD AUTO: 5.04 10E6/UL (ref 3.8–5.9)
SODIUM SERPL-SCNC: 140 MMOL/L (ref 135–145)
TSH SERPL DL<=0.005 MIU/L-ACNC: 1.29 UIU/ML (ref 0.3–4.2)
WBC # BLD AUTO: 10.8 10E3/UL (ref 4–11)

## 2024-12-04 PROCEDURE — 82306 VITAMIN D 25 HYDROXY: CPT | Mod: ZL | Performed by: NURSE PRACTITIONER

## 2024-12-04 PROCEDURE — 84443 ASSAY THYROID STIM HORMONE: CPT | Mod: ZL | Performed by: NURSE PRACTITIONER

## 2024-12-04 PROCEDURE — 85025 COMPLETE CBC W/AUTO DIFF WBC: CPT | Mod: ZL | Performed by: NURSE PRACTITIONER

## 2024-12-04 PROCEDURE — 82247 BILIRUBIN TOTAL: CPT | Mod: ZL | Performed by: NURSE PRACTITIONER

## 2024-12-04 PROCEDURE — 82310 ASSAY OF CALCIUM: CPT | Mod: ZL | Performed by: NURSE PRACTITIONER

## 2024-12-04 PROCEDURE — 36415 COLL VENOUS BLD VENIPUNCTURE: CPT | Mod: ZL | Performed by: NURSE PRACTITIONER

## 2024-12-04 PROCEDURE — G0463 HOSPITAL OUTPT CLINIC VISIT: HCPCS | Mod: 25

## 2024-12-04 RX ORDER — DEXTROAMPHETAMINE SACCHARATE, AMPHETAMINE ASPARTATE MONOHYDRATE, DEXTROAMPHETAMINE SULFATE AND AMPHETAMINE SULFATE 3.75; 3.75; 3.75; 3.75 MG/1; MG/1; MG/1; MG/1
15 CAPSULE, EXTENDED RELEASE ORAL DAILY
Qty: 30 CAPSULE | Refills: 0 | Status: SHIPPED | OUTPATIENT
Start: 2024-12-04

## 2024-12-04 RX ORDER — DEXTROAMPHETAMINE SACCHARATE, AMPHETAMINE ASPARTATE MONOHYDRATE, DEXTROAMPHETAMINE SULFATE AND AMPHETAMINE SULFATE 3.75; 3.75; 3.75; 3.75 MG/1; MG/1; MG/1; MG/1
15 CAPSULE, EXTENDED RELEASE ORAL DAILY
Qty: 30 CAPSULE | Refills: 0 | Status: SHIPPED | OUTPATIENT
Start: 2025-01-03

## 2024-12-04 RX ORDER — DEXTROAMPHETAMINE SACCHARATE, AMPHETAMINE ASPARTATE MONOHYDRATE, DEXTROAMPHETAMINE SULFATE AND AMPHETAMINE SULFATE 3.75; 3.75; 3.75; 3.75 MG/1; MG/1; MG/1; MG/1
15 CAPSULE, EXTENDED RELEASE ORAL DAILY
Qty: 30 CAPSULE | Refills: 0 | Status: SHIPPED | OUTPATIENT
Start: 2025-02-03

## 2024-12-04 RX ORDER — CETIRIZINE HYDROCHLORIDE 10 MG/1
10 TABLET ORAL DAILY
Qty: 30 TABLET | Refills: 5 | Status: SHIPPED | OUTPATIENT
Start: 2024-12-04

## 2024-12-04 ASSESSMENT — ANXIETY QUESTIONNAIRES
7. FEELING AFRAID AS IF SOMETHING AWFUL MIGHT HAPPEN: SEVERAL DAYS
4. TROUBLE RELAXING: SEVERAL DAYS
IF YOU CHECKED OFF ANY PROBLEMS ON THIS QUESTIONNAIRE, HOW DIFFICULT HAVE THESE PROBLEMS MADE IT FOR YOU TO DO YOUR WORK, TAKE CARE OF THINGS AT HOME, OR GET ALONG WITH OTHER PEOPLE: SOMEWHAT DIFFICULT
2. NOT BEING ABLE TO STOP OR CONTROL WORRYING: SEVERAL DAYS
GAD7 TOTAL SCORE: 9
3. WORRYING TOO MUCH ABOUT DIFFERENT THINGS: SEVERAL DAYS
GAD7 TOTAL SCORE: 9
1. FEELING NERVOUS, ANXIOUS, OR ON EDGE: SEVERAL DAYS
7. FEELING AFRAID AS IF SOMETHING AWFUL MIGHT HAPPEN: SEVERAL DAYS
5. BEING SO RESTLESS THAT IT IS HARD TO SIT STILL: NEARLY EVERY DAY
8. IF YOU CHECKED OFF ANY PROBLEMS, HOW DIFFICULT HAVE THESE MADE IT FOR YOU TO DO YOUR WORK, TAKE CARE OF THINGS AT HOME, OR GET ALONG WITH OTHER PEOPLE?: SOMEWHAT DIFFICULT
GAD7 TOTAL SCORE: 9
6. BECOMING EASILY ANNOYED OR IRRITABLE: SEVERAL DAYS

## 2024-12-04 ASSESSMENT — PAIN SCALES - GENERAL: PAINLEVEL_OUTOF10: NO PAIN (0)

## 2024-12-05 LAB — VIT D+METAB SERPL-MCNC: 17 NG/ML (ref 20–50)

## 2024-12-10 ENCOUNTER — HOSPITAL ENCOUNTER (OUTPATIENT)
Dept: EDUCATION SERVICES | Facility: HOSPITAL | Age: 24
Discharge: HOME | End: 2024-12-10
Attending: NURSE PRACTITIONER
Payer: COMMERCIAL

## 2024-12-10 PROCEDURE — 97802 MEDICAL NUTRITION INDIV IN: CPT | Performed by: DIETITIAN, REGISTERED

## 2024-12-10 NOTE — PROGRESS NOTES
"[facility]  Medical Nutrition Therapy    Visit Type: Initial Visit/New Problem    Patient Referred by: Lissy Perez NP     Referred Diagnosis:   E66.812 (ICD-10-CM) - Obesity, Class II, BMI 35-39.9, no comorbidity   Z86.59 (ICD-10-CM) - Hx of anorexia nervosa         Nutrition Assessment  Anthropometrics:  Height: 5' 3\"  BMI: 37.82   Weight: 213 lbs 8 oz  Weight Change: gaining     Wt Readings from Last 10 Encounters:   12/04/24 96.8 kg (213 lb 8 oz)   10/24/24 94 kg (207 lb 4.8 oz)   09/04/24 94.3 kg (208 lb)   05/28/24 91 kg (200 lb 9.6 oz)   02/26/24 88.1 kg (194 lb 4.8 oz)   01/31/24 81.6 kg (180 lb)   12/20/23 82.4 kg (181 lb 10.5 oz)   11/21/23 82.4 kg (181 lb 11.2 oz)   09/26/23 81.6 kg (180 lb)   08/21/23 80.7 kg (178 lb)        Nutrition History:  She has been using the [employer] demetri. Tracking food and weight. Pt doesn't feel comfortable doing this, she feels this will lead to eating disordered habits she use to have. Limited income. Doesn't like the texture of frozen fruit. Rarely eating out- 3-4 time a month about.    2 meals a day (skips lunch, working), snacking     Hx of eating disorder- her mom's boyfriend would make rude comments when she ate. Caused restriction, hid it from mom. Did not go to treatment. Has a supportive significant other.    Food Record:  Breakfast: around 7:30-8am, eggs, pancakes, cereal, coffee (cream and sugar)  Snacks: granola bars, chips, trying to get more fruit  Dinner: chicken breast, beef tacos, spaghetti, sometimes a potato  Snack: sometimes, sweets, ice cream (happening less often)  Beverages: coffee (2 cups per day, decaf at night), water (mostly water) maybe 20 oz?? Once bottle per day. Sometimes juice and milk and tea (hot tea)    Physical Activity:  Lives in an apartment, walks around apartment. Cleans for her grandma, up and down stairs. Tires to walk to the store. Walk for 30-60 minutes.    Medical History/Family " History:  limited    Medications:  Current Outpatient Medications   Medication Sig Dispense Refill    amphetamine-dextroamphetamine (ADDERALL XR) 15 MG 24 hr capsule Take 1 capsule (15 mg) by mouth daily. 30 capsule 0    [START ON 1/3/2025] amphetamine-dextroamphetamine (ADDERALL XR) 15 MG 24 hr capsule Take 1 capsule (15 mg) by mouth daily. 30 capsule 0    [START ON 2/3/2025] amphetamine-dextroamphetamine (ADDERALL XR) 15 MG 24 hr capsule Take 1 capsule (15 mg) by mouth daily. 30 capsule 0    amphetamine-dextroamphetamine (ADDERALL XR) 15 MG 24 hr capsule Take 1 capsule (15 mg) by mouth daily. Do not start before October 4, 2024. 30 capsule 0    amphetamine-dextroamphetamine (ADDERALL XR) 15 MG 24 hr capsule Take 1 capsule (15 mg) by mouth daily. Do not start before November 1, 2024. 30 capsule 0    cetirizine (ZYRTEC) 10 MG tablet Take 1 tablet (10 mg) by mouth daily. 30 tablet 5    escitalopram (LEXAPRO) 5 MG tablet Take 1 tablet (5 mg) by mouth daily      triamcinolone (KENALOG) 0.1 % external ointment Apply topically 2 times daily. 30 g 0     Current Facility-Administered Medications   Medication Dose Route Frequency Provider Last Rate Last Admin    medroxyPROGESTERone (DEPO-PROVERA) injection 150 mg  150 mg Intramuscular Q90 Days Cassie Wilson NP   150 mg at 10/24/24 1307        Allergies:     Allergies   Allergen Reactions    No Clinical Screening - See Comments     Oxcarbazepine      rash    Chlorine Rash      Nutrition Education:  Weight Management: Food choices, portions, meal/snack timing, meal planning, food label, activity, setting goals    Nutrition Goals:  Eat a piece of fruit with breakfast and veggie with dinner 5 nights a week    Nutrition Follow-up/ Monitoring:  Food recall, weight, labs      Time spent with pt - 60 min  Follow up with RD in 1 month, 1/6/25.   Patient has RD's contact information to call/email if needed.      Karime Toro RD

## 2024-12-26 ENCOUNTER — HOSPITAL ENCOUNTER (EMERGENCY)
Facility: HOSPITAL | Age: 24
Discharge: HOME | End: 2024-12-27
Attending: EMERGENCY MEDICINE | Admitting: EMERGENCY MEDICINE
Payer: COMMERCIAL

## 2024-12-26 DIAGNOSIS — R19.7 NAUSEA VOMITING AND DIARRHEA: ICD-10-CM

## 2024-12-26 DIAGNOSIS — R11.2 NAUSEA VOMITING AND DIARRHEA: ICD-10-CM

## 2024-12-26 PROCEDURE — 99284 EMERGENCY DEPT VISIT MOD MDM: CPT

## 2024-12-26 PROCEDURE — 99284 EMERGENCY DEPT VISIT MOD MDM: CPT | Performed by: EMERGENCY MEDICINE

## 2024-12-26 ASSESSMENT — COLUMBIA-SUICIDE SEVERITY RATING SCALE - C-SSRS
1. IN THE PAST MONTH, HAVE YOU WISHED YOU WERE DEAD OR WISHED YOU COULD GO TO SLEEP AND NOT WAKE UP?: NO
6. HAVE YOU EVER DONE ANYTHING, STARTED TO DO ANYTHING, OR PREPARED TO DO ANYTHING TO END YOUR LIFE?: NO
2. HAVE YOU ACTUALLY HAD ANY THOUGHTS OF KILLING YOURSELF IN THE PAST MONTH?: NO

## 2024-12-27 VITALS
DIASTOLIC BLOOD PRESSURE: 97 MMHG | SYSTOLIC BLOOD PRESSURE: 162 MMHG | RESPIRATION RATE: 18 BRPM | OXYGEN SATURATION: 95 % | HEART RATE: 103 BPM | TEMPERATURE: 97.6 F

## 2024-12-27 LAB
ALBUMIN UR-MCNC: 70 MG/DL
APPEARANCE UR: ABNORMAL
BILIRUB UR QL STRIP: NEGATIVE
COLOR UR AUTO: YELLOW
GLUCOSE UR STRIP-MCNC: NEGATIVE MG/DL
HCG UR QL: NEGATIVE
HGB UR QL STRIP: NEGATIVE
KETONES UR STRIP-MCNC: 150 MG/DL
LEUKOCYTE ESTERASE UR QL STRIP: NEGATIVE
MUCOUS THREADS #/AREA URNS LPF: PRESENT /LPF
NITRATE UR QL: NEGATIVE
PH UR STRIP: 5.5 [PH] (ref 4.7–8)
RBC URINE: 0 /HPF
SP GR UR STRIP: 1.04 (ref 1–1.03)
SQUAMOUS EPITHELIAL: 3 /HPF
UROBILINOGEN UR STRIP-MCNC: 2 MG/DL
WBC URINE: 1 /HPF

## 2024-12-27 PROCEDURE — 96372 THER/PROPH/DIAG INJ SC/IM: CPT | Performed by: EMERGENCY MEDICINE

## 2024-12-27 PROCEDURE — 250N000011 HC RX IP 250 OP 636: Performed by: EMERGENCY MEDICINE

## 2024-12-27 PROCEDURE — 81025 URINE PREGNANCY TEST: CPT | Performed by: EMERGENCY MEDICINE

## 2024-12-27 PROCEDURE — 81001 URINALYSIS AUTO W/SCOPE: CPT | Performed by: EMERGENCY MEDICINE

## 2024-12-27 RX ORDER — ONDANSETRON 2 MG/ML
4 INJECTION INTRAMUSCULAR; INTRAVENOUS ONCE
Status: COMPLETED | OUTPATIENT
Start: 2024-12-27 | End: 2024-12-27

## 2024-12-27 RX ORDER — ONDANSETRON 4 MG/1
4 TABLET, ORALLY DISINTEGRATING ORAL EVERY 8 HOURS PRN
Qty: 10 TABLET | Refills: 0 | Status: SHIPPED | OUTPATIENT
Start: 2024-12-27

## 2024-12-27 RX ORDER — ONDANSETRON 2 MG/ML
4 INJECTION INTRAMUSCULAR; INTRAVENOUS ONCE
Status: DISCONTINUED | OUTPATIENT
Start: 2024-12-27 | End: 2024-12-27

## 2024-12-27 RX ORDER — KETOROLAC TROMETHAMINE 30 MG/ML
30 INJECTION, SOLUTION INTRAMUSCULAR; INTRAVENOUS ONCE
Status: COMPLETED | OUTPATIENT
Start: 2024-12-27 | End: 2024-12-27

## 2024-12-27 RX ORDER — KETOROLAC TROMETHAMINE 30 MG/ML
30 INJECTION, SOLUTION INTRAMUSCULAR; INTRAVENOUS ONCE
Status: DISCONTINUED | OUTPATIENT
Start: 2024-12-27 | End: 2024-12-27

## 2024-12-27 RX ADMIN — KETOROLAC TROMETHAMINE 30 MG: 30 INJECTION, SOLUTION INTRAMUSCULAR at 00:29

## 2024-12-27 RX ADMIN — ONDANSETRON 4 MG: 2 INJECTION INTRAMUSCULAR; INTRAVENOUS at 00:29

## 2024-12-27 ASSESSMENT — ACTIVITIES OF DAILY LIVING (ADL)
ADLS_ACUITY_SCORE: 45
ADLS_ACUITY_SCORE: 45

## 2024-12-27 NOTE — ED PROVIDER NOTES
History     Chief Complaint   Patient presents with    Abdominal Pain     HPI  Radha Mayfield is a 24 year old adult who presents for nausea, vomiting, diarrhea that started a couple of hours ago.  She reports that her grandmother had the same symptoms 2 days ago.  Grandma's symptoms lasted about 24 hours and she is better now.  Patient denies fever, chest pain, shortness of breath, back pain.  She is having abdominal pain that she states is in the middle of her abdomen and is cramping in nature.  She has had previous appendectomy and denies any other abdominal surgeries.  Gets the Depo-Provera shot every 3 months and is not having regular menstrual periods.  She denies any vaginal irritation, discharge, dysuria, hematuria.    Allergies:  Allergies   Allergen Reactions    No Clinical Screening - See Comments     Oxcarbazepine      rash    Chlorine Rash       Problem List:    Patient Active Problem List    Diagnosis Date Noted    Vaginismus 10/20/2023     Priority: Medium    Pelvic floor dysfunction in female 10/20/2023     Priority: Medium    PTSD (post-traumatic stress disorder) 11/09/2022     Priority: Medium    Anxiety 11/09/2022     Priority: Medium    H/O absence seizures 10/16/2019     Priority: Medium    Attention deficit hyperactivity disorder (ADHD) 03/23/2007     Priority: Medium     IMO Update 10 2016          Past Medical History:    No past medical history on file.    Past Surgical History:    Past Surgical History:   Procedure Laterality Date    APPENDECTOMY      TONSILLECTOMY & ADENOIDECTOMY         Family History:    Family History   Problem Relation Age of Onset    Scoliosis Mother     Cardiovascular Mother     Endometriosis Mother     No Known Problems Father        Social History:  Marital Status:  Single [1]  Social History     Tobacco Use    Smoking status: Never     Passive exposure: Never    Smokeless tobacco: Never   Vaping Use    Vaping status: Never Used   Substance Use Topics     Alcohol use: Not Currently    Drug use: Not Currently        Medications:    ondansetron (ZOFRAN ODT) 4 MG ODT tab  amphetamine-dextroamphetamine (ADDERALL XR) 15 MG 24 hr capsule  [START ON 1/3/2025] amphetamine-dextroamphetamine (ADDERALL XR) 15 MG 24 hr capsule  [START ON 2/3/2025] amphetamine-dextroamphetamine (ADDERALL XR) 15 MG 24 hr capsule  amphetamine-dextroamphetamine (ADDERALL XR) 15 MG 24 hr capsule  amphetamine-dextroamphetamine (ADDERALL XR) 15 MG 24 hr capsule  cetirizine (ZYRTEC) 10 MG tablet  escitalopram (LEXAPRO) 5 MG tablet  triamcinolone (KENALOG) 0.1 % external ointment          Review of Systems    Medically appropriate review of systems was done.  See HPI for details.    Physical Exam   BP: 139/80  Pulse: 105  Temp: 97.6  F (36.4  C)  Resp: 18  SpO2: 99 %      Physical Exam    GEN:  Alert, well developed, no acute distress  HEENT:  PERRL, EOMI, Mucous membranes are moist.   Cardio:  RRR, no murmur, radial pulses equal bilaterally  PULM:  Lungs clear, good air movement, no wheezes, rales   Abd:  Soft, normal bowel sounds, there is diffuse tenderness without guarding or percussion tenderness.  Back exam:  No CVA tenderness  Musculoskeletal:  normal range of motion, no lower extremity swelling or calf tenderness  Neuro:  Alert and oriented X3, Follows commands, moving all extremities spontaneously   Skin:  Warm, dry     ED Course        Procedures             Critical Care time:    Urine was obtained, results were reviewed by me and are negative except as shown.  Patient was feeling much better after getting IM Zofran and IM Toradol.  She had no further vomiting in the ED and was able to keep down sips of ginger ale in the ED.       Results for orders placed or performed during the hospital encounter of 12/26/24 (from the past 24 hours)   UA Macroscopic with reflex to Microscopic and Culture    Specimen: Urine, Midstream   Result Value Ref Range    Color Urine Yellow Colorless, Straw, Light  Yellow, Yellow    Appearance Urine Slightly Cloudy (A) Clear    Glucose Urine Negative Negative mg/dL    Bilirubin Urine Negative Negative    Ketones Urine 150 (A) Negative mg/dL    Specific Gravity Urine 1.042 (H) 1.003 - 1.035    Blood Urine Negative Negative    pH Urine 5.5 4.7 - 8.0    Protein Albumin Urine 70 (A) Negative mg/dL    Urobilinogen Urine 2.0 Normal, 2.0 mg/dL    Nitrite Urine Negative Negative    Leukocyte Esterase Urine Negative Negative    Mucus Urine Present (A) None Seen /LPF    RBC Urine 0 <=2 /HPF    WBC Urine 1 <=5 /HPF    Squamous Epithelials Urine 3 (H) <=1 /HPF    Narrative    Urine Culture not indicated   HCG qualitative urine   Result Value Ref Range    hCG Urine Qualitative Negative Negative       Medications   ondansetron (ZOFRAN) injection 4 mg (4 mg Intramuscular $Given 12/27/24 0029)   ketorolac (TORADOL) injection 30 mg (30 mg Intramuscular $Given 12/27/24 0029)       Assessments & Plan (with Medical Decision Making)   Patient presents with abdominal pain, nausea, vomiting, diarrhea after exposure to her grandmother who had similar symptoms 2 days ago.  Suspect infectious cause of gastroenteritis.  She has no focal abdominal tenderness to suggest acute intra-abdominal surgical emergency.  Since it is early in the illness, I do not think labs are needed at this time.  UPT is negative, urinalysis is not highly suggestive of infection.  Will treat symptomatically.  Patient was given prescription for Zofran sent to the [facility].  She was advised to return if she is unable to eat or drink, if she has fever, uncontrolled pain, any other concerns.      I have reviewed the nursing notes.    I have reviewed the findings, diagnosis, plan and need for follow up with the patient.           Medical Decision Making  The patient's presentation was of moderate complexity (an acute illness with systemic symptoms).    The patient's evaluation involved:  ordering and/or review of 3+ test(s)  in this encounter (see separate area of note for details)    The patient's management necessitated moderate risk (prescription drug management including medications given in the ED).        Discharge Medication List as of 12/27/2024  2:44 AM        START taking these medications    Details   ondansetron (ZOFRAN ODT) 4 MG ODT tab Take 1 tablet (4 mg) by mouth every 8 hours as needed for nausea., Disp-10 tablet, R-0, [facility]             Final diagnoses:   Nausea vomiting and diarrhea       12/26/2024   [facility]       Karime Torres MD  12/27/24 [id number]

## 2024-12-27 NOTE — DISCHARGE INSTRUCTIONS
You can take ondansetron (Zofran) as needed for nausea.  This will take a couple minutes to dissolve under your tongue.  Drink small amounts of fluids frequently to try to stay hydrated.  You can also take Tylenol as needed for cramping.  Please return to the emergency department if you continue to have vomiting for a full 24 hours and are unable to keep liquids down, if you have intolerable symptoms, any other concerns.  Sure you are washing your hands frequently with soap and water to help avoid spreading the illness.

## 2024-12-27 NOTE — ED TRIAGE NOTES
"Pt is here via [facility] 7/10 abd pain, pt notes has been having diarrhea today and vomited today, denies any blood in vomit. Pt notes no appendix. Pain onset 8 pm today after eating. 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 Pt notes taking a citrus OTC medication for symptoms but can't remember name.        "

## 2025-01-06 ENCOUNTER — HOSPITAL ENCOUNTER (OUTPATIENT)
Dept: EDUCATION SERVICES | Facility: HOSPITAL | Age: 25
Discharge: HOME | End: 2025-01-06
Attending: NURSE PRACTITIONER | Admitting: NURSE PRACTITIONER
Payer: COMMERCIAL

## 2025-01-06 PROCEDURE — 97803 MED NUTRITION INDIV SUBSEQ: CPT | Performed by: DIETITIAN, REGISTERED

## 2025-01-06 NOTE — PROGRESS NOTES
"[facility]  Medical Nutrition Therapy    Visit Type: Follow-up    Patient Referred by: Lissy Perez NP      Referred Diagnosis:   E66.812 (ICD-10-CM) - Obesity, Class II, BMI 35-39.9, no comorbidity   Z86.59 (ICD-10-CM) - Hx of anorexia nervosa       Nutrition Assessment  Anthropometrics:  Height: 5' 3\"  BMI: Body mass index is 35.96 kg/m .    Weight: 203 lbs 0 oz  Weight Change: trending down, 10lb loss in 2 months     Wt Readings from Last 10 Encounters:   01/07/25 92.1 kg (203 lb)   12/04/24 96.8 kg (213 lb 8 oz)   10/24/24 94 kg (207 lb 4.8 oz)   09/04/24 94.3 kg (208 lb)   05/28/24 91 kg (200 lb 9.6 oz)   02/26/24 88.1 kg (194 lb 4.8 oz)   01/31/24 81.6 kg (180 lb)   12/20/23 82.4 kg (181 lb 10.5 oz)   11/21/23 82.4 kg (181 lb 11.2 oz)   09/26/23 81.6 kg (180 lb)        Nutrition History:  Had norovirus at the end of December, food plan got set aside. Trying to get fruit in the morning and veggies in dinner. Tracking food in a journal some days. Activity is a bit limited, but stays active walking around her apartment and helping around her grandma's house.    Food Record:  Breakfast: eggs or breakfast burrito or smoothie bowl  Lunch: sandwich or shells and cheese  Dinner: taco or salad  Snack: strawberries, 1/2 banana  Beverages: decaf coffee, water    Physical Activity:  Walking around apartment, cleaning grandma's, some walks outside    Medical History/Family History:  limited    Medications:  Current Outpatient Medications   Medication Sig Dispense Refill    amphetamine-dextroamphetamine (ADDERALL XR) 15 MG 24 hr capsule Take 1 capsule (15 mg) by mouth daily. 30 capsule 0    amphetamine-dextroamphetamine (ADDERALL XR) 15 MG 24 hr capsule Take 1 capsule (15 mg) by mouth daily. 30 capsule 0    [START ON 2/3/2025] amphetamine-dextroamphetamine (ADDERALL XR) 15 MG 24 hr capsule Take 1 capsule (15 mg) by mouth daily. 30 capsule 0    amphetamine-dextroamphetamine (ADDERALL XR) 15 MG 24 hr " capsule Take 1 capsule (15 mg) by mouth daily. Do not start before October 4, 2024. 30 capsule 0    amphetamine-dextroamphetamine (ADDERALL XR) 15 MG 24 hr capsule Take 1 capsule (15 mg) by mouth daily. Do not start before November 1, 2024. 30 capsule 0    cetirizine (ZYRTEC) 10 MG tablet Take 1 tablet (10 mg) by mouth daily. 30 tablet 5    escitalopram (LEXAPRO) 5 MG tablet Take 1 tablet (5 mg) by mouth daily      ondansetron (ZOFRAN ODT) 4 MG ODT tab Take 1 tablet (4 mg) by mouth every 8 hours as needed for nausea. 10 tablet 0    triamcinolone (KENALOG) 0.1 % external ointment Apply topically 2 times daily. 30 g 0     Current Facility-Administered Medications   Medication Dose Route Frequency Provider Last Rate Last Admin    medroxyPROGESTERone (DEPO-PROVERA) injection 150 mg  150 mg Intramuscular Q90 Days Cassie Wilson NP   150 mg at 10/24/24 1307        Allergies:     Allergies   Allergen Reactions    No Clinical Screening - See Comments     Oxcarbazepine      rash    Chlorine Rash        Nutrition Education:  Getting back on track with goals. Pt presented some smoothie recipe options she would like to try, encouraged her to use 1 cup of fruit, add a few dollops of greek yogurt, and ice/water/milk to desired consistency    Nutrition Goals:  Fruit with breakfast and veggie with dinner 5 days a week    Nutrition Follow-up/ Monitoring:  Food recall, weight, labs    Time spent with patient:   Follow up with RD in 1-2 month. 2/10/25  Patient has RD's contact information to call/email if needed.      Karime Toro RD

## 2025-01-07 VITALS — WEIGHT: 203 LBS | HEIGHT: 63 IN | BODY MASS INDEX: 35.97 KG/M2

## 2025-01-09 ENCOUNTER — ALLIED HEALTH/NURSE VISIT (OUTPATIENT)
Dept: OBGYN | Facility: OTHER | Age: 25
End: 2025-01-09
Attending: NURSE PRACTITIONER
Payer: COMMERCIAL

## 2025-01-09 DIAGNOSIS — Z30.42 ENCOUNTER FOR SURVEILLANCE OF INJECTABLE CONTRACEPTIVE: Primary | ICD-10-CM

## 2025-01-09 PROCEDURE — 250N000011 HC RX IP 250 OP 636: Mod: JZ | Performed by: NURSE PRACTITIONER

## 2025-01-09 PROCEDURE — 96372 THER/PROPH/DIAG INJ SC/IM: CPT | Performed by: NURSE PRACTITIONER

## 2025-01-09 RX ORDER — MEDROXYPROGESTERONE ACETATE 150 MG/ML
150 INJECTION, SUSPENSION INTRAMUSCULAR
Status: ACTIVE | OUTPATIENT
Start: 2025-01-09 | End: 2026-01-04

## 2025-01-09 RX ADMIN — MEDROXYPROGESTERONE ACETATE 150 MG: 150 INJECTION, SUSPENSION INTRAMUSCULAR at 11:29

## 2025-01-09 NOTE — PROGRESS NOTES

## 2025-02-10 ENCOUNTER — HOSPITAL ENCOUNTER (OUTPATIENT)
Dept: EDUCATION SERVICES | Facility: HOSPITAL | Age: 25
Discharge: HOME | End: 2025-02-10
Attending: NURSE PRACTITIONER | Admitting: NURSE PRACTITIONER
Payer: COMMERCIAL

## 2025-02-10 VITALS — WEIGHT: 207.1 LBS | BODY MASS INDEX: 36.7 KG/M2 | HEIGHT: 63 IN

## 2025-02-10 PROCEDURE — 97803 MED NUTRITION INDIV SUBSEQ: CPT | Performed by: DIETITIAN, REGISTERED

## 2025-02-10 NOTE — PROGRESS NOTES
[facility]  Medical Nutrition Therapy    Visit Type: Follow-up     Patient Referred by: Lissy Perez NP      Referred Diagnosis:   E66.812 (ICD-10-CM) - Obesity, Class II, BMI 35-39.9, no comorbidity   Z86.59 (ICD-10-CM) - Hx of anorexia nervosa          Nutrition Assessment  Anthropometrics:  Height: Data Unavailable  BMI: Body mass index is 36.69 kg/m .    Weight: 207 lbs 1.6 oz  Weight Change: trending up- 4lbs in the last month     Wt Readings from Last 10 Encounters:   02/10/25 93.9 kg (207 lb 1.6 oz)   01/07/25 92.1 kg (203 lb)   12/04/24 96.8 kg (213 lb 8 oz)   10/24/24 94 kg (207 lb 4.8 oz)   09/04/24 94.3 kg (208 lb)   05/28/24 91 kg (200 lb 9.6 oz)   02/26/24 88.1 kg (194 lb 4.8 oz)   01/31/24 81.6 kg (180 lb)   12/20/23 82.4 kg (181 lb 10.5 oz)   11/21/23 82.4 kg (181 lb 11.2 oz)        Nutrition History:  Birthday month, was indulging more often with treats/eating out. Getting back on track. Trying to keep active around her apartment and going outside when it is nice. Weight is up a few pounds in the last month.    Food Record:  Breakfast: smoothie bowl or eggs  Lunch: Sandwiches and chips  Dinner: steak, some take-out, trying to eat salad  Snack: strawberries and blackberries, anything she can get her hands on  Beverages: 8oz coffee (with stevia)    Physical Activity:  Pacing around apartment for 20-30 minutes. Playing with the cat. Hobbies include baking and crocheting.     Medical History/Family History:  Limited     Medications:  Current Outpatient Medications   Medication Sig Dispense Refill    amphetamine-dextroamphetamine (ADDERALL XR) 15 MG 24 hr capsule Take 1 capsule (15 mg) by mouth daily. 30 capsule 0    amphetamine-dextroamphetamine (ADDERALL XR) 15 MG 24 hr capsule Take 1 capsule (15 mg) by mouth daily. 30 capsule 0    amphetamine-dextroamphetamine (ADDERALL XR) 15 MG 24 hr capsule Take 1 capsule (15 mg) by mouth daily. 30 capsule 0    amphetamine-dextroamphetamine  (ADDERALL XR) 15 MG 24 hr capsule Take 1 capsule (15 mg) by mouth daily. Do not start before October 4, 2024. 30 capsule 0    amphetamine-dextroamphetamine (ADDERALL XR) 15 MG 24 hr capsule Take 1 capsule (15 mg) by mouth daily. Do not start before November 1, 2024. 30 capsule 0    cetirizine (ZYRTEC) 10 MG tablet Take 1 tablet (10 mg) by mouth daily. 30 tablet 5    escitalopram (LEXAPRO) 5 MG tablet Take 1 tablet (5 mg) by mouth daily      ondansetron (ZOFRAN ODT) 4 MG ODT tab Take 1 tablet (4 mg) by mouth every 8 hours as needed for nausea. 10 tablet 0    triamcinolone (KENALOG) 0.1 % external ointment Apply topically 2 times daily. 30 g 0     Current Facility-Administered Medications   Medication Dose Route Frequency Provider Last Rate Last Admin    medroxyPROGESTERone (DEPO-PROVERA) injection 150 mg  150 mg Intramuscular Q90 Days Cassie Wilson NP   150 mg at 01/09/25 1129    medroxyPROGESTERone (DEPO-PROVERA) injection 150 mg  150 mg Intramuscular Q90 Days Cassie Wilson NP   150 mg at 10/24/24 1307        Allergies:     Allergies   Allergen Reactions    No Clinical Screening - See Comments     Oxcarbazepine      rash    Chlorine Rash        Nutrition Education:  Getting back on track with eating and activity habits.     Nutrition Goals:  30 minutes of activity daily- walking, dancing, youtube videos    Nutrition Follow-up/ Monitoring:  Food recall, weight, labs    Time spent with pt: 20 min  Follow up with RD in 2-3 months.   Patient has RD's contact information to call/email if needed.      Karime Toro RD

## 2025-03-03 NOTE — PROGRESS NOTES
"  Assessment & Plan     (F90.9) Attention deficit hyperactivity disorder (ADHD), unspecified ADHD type  (primary encounter diagnosis)  Plan: Pt shows no signs of diversion or abuse. Up to 3 month of his/her ADD/ADHD medications given today. Pt is aware that he/she is solely responsible for protections of the prescriptions. I will not tolerate any lost or stolen prescriptions lightly. Will see pt back in office in 3 months.     (F41.9) Anxiety  (F33.1) Moderate recurrent major depression (H)  Comment: controlled  Plan: C/W Lexapro.       BMI  Estimated body mass index is 37.15 kg/m  as calculated from the following:    Height as of 2/10/25: 1.6 m (5' 3\").    Weight as of this encounter: 95.1 kg (209 lb 11.2 oz).   Weight management plan: Discussed healthy diet and exercise guidelines      The longitudinal plan of care for the diagnosis(es)/condition(s) as documented were addressed during this visit. Due to the added complexity in care, I will continue to support Radha in the subsequent management and with ongoing continuity of care.    Subjective   Radha is a 25 year old, presenting for the following health issues:  A.D.H.D    HPI      Medication Followup of Adderall 15 mg XR  Taking Medication as prescribed: yes  Side Effects:  None; Denies chest pain, shortness of breath, dizziness, syncope, or palpitations.   Medication Helping Symptoms:  yes; helping with focus and staying on task    Anxiety and depression controlled with Lexapro 5 mg.         Review of Systems  Constitutional, HEENT, cardiovascular, pulmonary, gi and gu systems are negative, except as otherwise noted.      Objective    /84 (BP Location: Right arm, Patient Position: Sitting, Cuff Size: Adult Large)   Pulse 112   Temp 99.9  F (37.7  C) (Tympanic)   Resp 16   Wt 95.1 kg (209 lb 11.2 oz)   SpO2 97%   BMI 37.15 kg/m    Body mass index is 37.15 kg/m .  Physical Exam   Exam:  Constitutional: healthy, alert, and no " distress  Cardiovascular: No lifts, heaves, or thrills. RRR. No murmurs, clicks gallops or rub  Respiratory: Good diaphragmatic excursion. Lungs clear  Neurologic: Gait normal. Reflexes normal and symmetric. Sensation grossly WNL.  Psychiatric: mentation appears normal and affect normal/bright                Signed Electronically by: Lissy Perez NP

## 2025-03-04 ENCOUNTER — OFFICE VISIT (OUTPATIENT)
Dept: FAMILY MEDICINE | Facility: OTHER | Age: 25
End: 2025-03-04
Attending: NURSE PRACTITIONER
Payer: COMMERCIAL

## 2025-03-04 VITALS
BODY MASS INDEX: 37.15 KG/M2 | SYSTOLIC BLOOD PRESSURE: 125 MMHG | TEMPERATURE: 99.9 F | DIASTOLIC BLOOD PRESSURE: 84 MMHG | RESPIRATION RATE: 16 BRPM | OXYGEN SATURATION: 97 % | HEART RATE: 112 BPM | WEIGHT: 209.7 LBS

## 2025-03-04 DIAGNOSIS — F41.9 ANXIETY: ICD-10-CM

## 2025-03-04 DIAGNOSIS — F90.9 ATTENTION DEFICIT HYPERACTIVITY DISORDER (ADHD), UNSPECIFIED ADHD TYPE: Primary | ICD-10-CM

## 2025-03-04 DIAGNOSIS — F33.1 MODERATE RECURRENT MAJOR DEPRESSION (H): ICD-10-CM

## 2025-03-04 PROCEDURE — G0463 HOSPITAL OUTPT CLINIC VISIT: HCPCS

## 2025-03-04 RX ORDER — DEXTROAMPHETAMINE SACCHARATE, AMPHETAMINE ASPARTATE MONOHYDRATE, DEXTROAMPHETAMINE SULFATE AND AMPHETAMINE SULFATE 3.75; 3.75; 3.75; 3.75 MG/1; MG/1; MG/1; MG/1
15 CAPSULE, EXTENDED RELEASE ORAL DAILY
Qty: 30 CAPSULE | Refills: 0 | Status: SHIPPED | OUTPATIENT
Start: 2025-03-04

## 2025-03-04 RX ORDER — DEXTROAMPHETAMINE SACCHARATE, AMPHETAMINE ASPARTATE MONOHYDRATE, DEXTROAMPHETAMINE SULFATE AND AMPHETAMINE SULFATE 3.75; 3.75; 3.75; 3.75 MG/1; MG/1; MG/1; MG/1
15 CAPSULE, EXTENDED RELEASE ORAL DAILY
Qty: 30 CAPSULE | Refills: 0 | Status: SHIPPED | OUTPATIENT
Start: 2025-04-03

## 2025-03-04 RX ORDER — DEXTROAMPHETAMINE SACCHARATE, AMPHETAMINE ASPARTATE MONOHYDRATE, DEXTROAMPHETAMINE SULFATE AND AMPHETAMINE SULFATE 3.75; 3.75; 3.75; 3.75 MG/1; MG/1; MG/1; MG/1
15 CAPSULE, EXTENDED RELEASE ORAL DAILY
Qty: 30 CAPSULE | Refills: 0 | Status: SHIPPED | OUTPATIENT
Start: 2025-05-02

## 2025-03-04 ASSESSMENT — ANXIETY QUESTIONNAIRES
5. BEING SO RESTLESS THAT IT IS HARD TO SIT STILL: SEVERAL DAYS
8. IF YOU CHECKED OFF ANY PROBLEMS, HOW DIFFICULT HAVE THESE MADE IT FOR YOU TO DO YOUR WORK, TAKE CARE OF THINGS AT HOME, OR GET ALONG WITH OTHER PEOPLE?: SOMEWHAT DIFFICULT
IF YOU CHECKED OFF ANY PROBLEMS ON THIS QUESTIONNAIRE, HOW DIFFICULT HAVE THESE PROBLEMS MADE IT FOR YOU TO DO YOUR WORK, TAKE CARE OF THINGS AT HOME, OR GET ALONG WITH OTHER PEOPLE: SOMEWHAT DIFFICULT
6. BECOMING EASILY ANNOYED OR IRRITABLE: MORE THAN HALF THE DAYS
7. FEELING AFRAID AS IF SOMETHING AWFUL MIGHT HAPPEN: SEVERAL DAYS
GAD7 TOTAL SCORE: 8
3. WORRYING TOO MUCH ABOUT DIFFERENT THINGS: SEVERAL DAYS
4. TROUBLE RELAXING: SEVERAL DAYS
7. FEELING AFRAID AS IF SOMETHING AWFUL MIGHT HAPPEN: SEVERAL DAYS
GAD7 TOTAL SCORE: 8
GAD7 TOTAL SCORE: 8
2. NOT BEING ABLE TO STOP OR CONTROL WORRYING: SEVERAL DAYS
1. FEELING NERVOUS, ANXIOUS, OR ON EDGE: SEVERAL DAYS

## 2025-03-04 ASSESSMENT — PAIN SCALES - GENERAL: PAINLEVEL_OUTOF10: NO PAIN (0)

## 2025-03-06 ENCOUNTER — TELEPHONE (OUTPATIENT)
Dept: OBGYN | Facility: OTHER | Age: 25
End: 2025-03-06

## 2025-03-31 ENCOUNTER — ALLIED HEALTH/NURSE VISIT (OUTPATIENT)
Dept: OBGYN | Facility: OTHER | Age: 25
End: 2025-03-31
Attending: NURSE PRACTITIONER
Payer: COMMERCIAL

## 2025-03-31 DIAGNOSIS — Z30.42 ENCOUNTER FOR SURVEILLANCE OF INJECTABLE CONTRACEPTIVE: Primary | ICD-10-CM

## 2025-03-31 PROCEDURE — 250N000011 HC RX IP 250 OP 636: Mod: JZ | Performed by: NURSE PRACTITIONER

## 2025-03-31 PROCEDURE — 96372 THER/PROPH/DIAG INJ SC/IM: CPT | Performed by: NURSE PRACTITIONER

## 2025-03-31 RX ADMIN — MEDROXYPROGESTERONE ACETATE 150 MG: 150 INJECTION, SUSPENSION INTRAMUSCULAR at 15:07

## 2025-03-31 NOTE — PROGRESS NOTES
Clinic Administered Medication Documentation      Depo Provera Documentation    Depo-Provera Standing Order inclusion/exclusion criteria reviewed.     Is this the initial or subsequent dose of Depo Provera? Subsequent dose - patient is within the acceptable window of time (11-15 weeks) for subsequent injection. Pregnancy test not indicated.    Patient meets: inclusion criteria     Is there an active order (written within the past 365 days, with administrations remaining, not ) in the chart? Yes.     Prior to injection, verified patient identity using patient's name and date of birth. Medication was administered. Please see MAR and medication order for additional information.     Vial/Syringe: Single dose vial. Was entire vial of medication used? Yes    Patient instructed to report any adverse reaction to staff immediately and remain in clinic for 15 minutes and report any adverse reaction to staff immediately but patient declined.  NEXT INJECTION DUE: 25 - 25    Verified that the patient has refills remaining in their prescription.

## 2025-04-14 ENCOUNTER — HOSPITAL ENCOUNTER (OUTPATIENT)
Dept: EDUCATION SERVICES | Facility: HOSPITAL | Age: 25
Discharge: HOME | End: 2025-04-14
Attending: NURSE PRACTITIONER | Admitting: NURSE PRACTITIONER
Payer: COMMERCIAL

## 2025-04-14 PROCEDURE — 97803 MED NUTRITION INDIV SUBSEQ: CPT | Performed by: DIETITIAN, REGISTERED

## 2025-04-14 NOTE — PROGRESS NOTES
[facility]  Medical Nutrition Therapy    Visit Type: Follow-up     Patient Referred by: Lissy Perez NP      Referred Diagnosis:   E66.812 (ICD-10-CM) - Obesity, Class II, BMI 35-39.9, no comorbidity   Z86.59 (ICD-10-CM) - Hx of anorexia nervosa        Nutrition Assessment  Anthropometrics: scale not avaialble    Wt Readings from Last 10 Encounters:   03/04/25 95.1 kg (209 lb 11.2 oz)   02/10/25 93.9 kg (207 lb 1.6 oz)   01/07/25 92.1 kg (203 lb)   12/04/24 96.8 kg (213 lb 8 oz)   10/24/24 94 kg (207 lb 4.8 oz)   09/04/24 94.3 kg (208 lb)   05/28/24 91 kg (200 lb 9.6 oz)   02/26/24 88.1 kg (194 lb 4.8 oz)   01/31/24 81.6 kg (180 lb)   12/20/23 82.4 kg (181 lb 10.5 oz)        Nutrition History:  Food choices are better. Getting back on track. If she doesn't eat enough, she gets dizzy. She questions low BG, family hx of diabetes. Tries to eat meals/snacks throughout the day and tries not to skip meals. Boyfriend lost his job in January, less money for food. Has food stamps now.    Food Record:  Breakfast: smoothie bowl- fruit, oatmilk and bananas  Lunch: sandwich or shells and cheese  Dinner: artichoke spinach pasta (ok), chicken, spaghetti, tacos, paprika chicken, apricot honey chicken. Salad with dinner  Snack: berries, some sweets  Beverages: decaf coffee, water, rarely drinks soda    Physical Activity:  Keeping busy, walking to do errands (groceries 1-3x  week), library. [employer].     Medical History/Family History:  limited    Medications:  Current Outpatient Medications   Medication Sig Dispense Refill    amphetamine-dextroamphetamine (ADDERALL XR) 15 MG 24 hr capsule Take 1 capsule (15 mg) by mouth daily. 30 capsule 0    amphetamine-dextroamphetamine (ADDERALL XR) 15 MG 24 hr capsule Take 1 capsule (15 mg) by mouth daily. 30 capsule 0    [START ON 5/2/2025] amphetamine-dextroamphetamine (ADDERALL XR) 15 MG 24 hr capsule Take 1 capsule (15 mg) by mouth daily. 30 capsule 0    cetirizine  (ZYRTEC) 10 MG tablet Take 1 tablet (10 mg) by mouth daily. 30 tablet 5    escitalopram (LEXAPRO) 5 MG tablet Take 1 tablet (5 mg) by mouth daily      ondansetron (ZOFRAN ODT) 4 MG ODT tab Take 1 tablet (4 mg) by mouth every 8 hours as needed for nausea. 10 tablet 0    triamcinolone (KENALOG) 0.1 % external ointment Apply topically 2 times daily. 30 g 0     Current Facility-Administered Medications   Medication Dose Route Frequency Provider Last Rate Last Admin    medroxyPROGESTERone (DEPO-PROVERA) injection 150 mg  150 mg Intramuscular Q90 Days Cassie Wilson NP   150 mg at 01/09/25 1129    medroxyPROGESTERone (DEPO-PROVERA) injection 150 mg  150 mg Intramuscular Q90 Days Cassie Wilson NP   150 mg at 03/31/25 1507        Allergies:     Allergies   Allergen Reactions    No Clinical Screening - See Comments     Oxcarbazepine      rash    Chlorine Rash        Nutrition Education:  Exercise, stay motivated to keep moving    Nutrition Goals:  Walking- 3 days a week for 30 minutes    Nutrition Follow-up/ Monitoring:  Food recall, weight, labs    Time spent with pt:15 min  Follow up with RD in 2 months.   Patient has RD's contact information to call/email if needed.      Karime Toro RD

## 2025-06-03 DIAGNOSIS — J30.2 SEASONAL ALLERGIC RHINITIS, UNSPECIFIED TRIGGER: ICD-10-CM

## 2025-06-03 DIAGNOSIS — F90.9 ATTENTION DEFICIT HYPERACTIVITY DISORDER (ADHD), UNSPECIFIED ADHD TYPE: ICD-10-CM

## 2025-06-03 RX ORDER — DEXTROAMPHETAMINE SACCHARATE, AMPHETAMINE ASPARTATE MONOHYDRATE, DEXTROAMPHETAMINE SULFATE AND AMPHETAMINE SULFATE 3.75; 3.75; 3.75; 3.75 MG/1; MG/1; MG/1; MG/1
15 CAPSULE, EXTENDED RELEASE ORAL DAILY
Qty: 30 CAPSULE | Refills: 0 | Status: SHIPPED | OUTPATIENT
Start: 2025-06-03 | End: 2025-06-04

## 2025-06-03 RX ORDER — CETIRIZINE HYDROCHLORIDE 10 MG/1
10 TABLET ORAL DAILY
Qty: 30 TABLET | Refills: 0 | Status: SHIPPED | OUTPATIENT
Start: 2025-06-03 | End: 2025-06-04

## 2025-06-03 NOTE — TELEPHONE ENCOUNTER
amphetamine-dextroamphetamine (ADDERALL XR) 15 MG 24 hr capsule     Last Written Prescription Date:  5-2-25  Last Fill Quantity: 30,   # refills: 0  Last Office Visit: 12-4-24  Future Office visit:        Routing refill request to provider for review/approval because:  Drug not on the [facility], [facility] or [facility] refill protocol or controlled substance            cetirizine (ZYRTEC) 10 MG tablet     Last Written Prescription Date:  12-4-24  Last Fill Quantity: 30,   # refills: 5  Last Office Visit: 12-4-24  Future Office visit:    Next 5 appointments (look out 90 days)      Jun 04, 2025 1:30 PM  (Arrive by 1:15 PM)  Provider Visit with Lissy Perez NP  [facility] ([facility] ) [address]  [place] [address]  [phone]     Jun 16, 2025 4:00 PM  (Arrive by 3:45 PM)  Nurse Only with [facility]  [facility] [address]  [place] [address]  [phone]             Routing refill request to provider for review/approval because:  Drug not on the [facility] or  Health refill protocol or controlled substance

## 2025-06-03 NOTE — PROGRESS NOTES
Assessment & Plan     (F90.9) Attention deficit hyperactivity disorder (ADHD), unspecified ADHD type  (primary encounter diagnosis)  Plan: Pt shows no signs of diversion or abuse. Up to 3 month of his/her ADD/ADHD medications given today. Pt is aware that he/she is solely responsible for protections of the prescriptions. I will not tolerate any lost or stolen prescriptions lightly. Will see pt back in office in 3 months.     (F41.9) Anxiety  Comment: stable  Plan: Continue current medications.     (F33.1) Moderate recurrent major depression (H)  Comment: stable  Plan: Continue current medications.     (K21.00) Gastroesophageal reflux disease with esophagitis without hemorrhage  Comment: worsening, triggered by fatty or spicy foods  Plan: famotidine (PEPCID) 20 MG tablet        Will start Pepcid and reassess in 3 months, sooner with new or worsening symptoms. Encourage to avoid trigger foods.     (J30.2) Seasonal allergic rhinitis, unspecified trigger  (R06.2) Wheezes  Comment: oxygen sats 99 percent - normal; worse with smoke in air  Plan: XR Chest 2 Views        Will update CXR, but treat with albuterol and prednisone burst. Will continue Zyrtec. If symptoms do not improve, she will return.     (E55.9) Vitamin D deficiency  Plan: Vitamin D Deficiency        Will notify patient of the results when available and intervene accordingly.       The longitudinal plan of care for the diagnosis(es)/condition(s) as documented were addressed during this visit. Due to the added complexity in care, I will continue to support Radha in the subsequent management and with ongoing continuity of care.    Devon Garcia is a 25 year old, presenting for the following health issues:  A.D.H.D, Anxiety, and Depression    History of Present Illness       Mental Health Follow-up:  Patient presents to follow-up on Depression & Anxiety.Patient's depression since last visit has been:  Better  The patient is not having other symptoms  "associated with depression.  Patient's anxiety since last visit has been:  Better  The patient is not having other symptoms associated with anxiety.  Any significant life events: financial concerns, housing concerns and health concerns  Patient is not feeling anxious or having panic attacks.  Patient has no concerns about alcohol or drug use.    Radha eats 4 or more servings of fruits and vegetables daily.Radha consumes 2 sweetened beverage(s) daily.Radha exercises with enough effort to increase Radha's heart rate 30 to 60 minutes per day.  Radha exercises with enough effort to increase Radha's heart rate 7 days per week.   Radha is taking medications regularly.        Medication Followup of Adderall 15 mg XR  Taking Medication as prescribed: yes  Side Effects:  None, Denies chest pain, shortness of breath, dizziness, syncope, or palpitations.   Medication Helping Symptoms:  yes; helping with focus and staying on task    Depression and Anxiety   How are you doing with your depression since your last visit? No change - stable  How are you doing with your anxiety since your last visit?  No change - stable  Are you having other symptoms that might be associated with depression or anxiety? No  Have you had a significant life event? Job Concerns   Do you have any concerns with your use of alcohol or other drugs? No  Taking Lexapro 5 mg.   No thoughts of self harm.   Works with counselor.     She notes she has been having issues with gastric reflux. Triggered by spicy or fatty foods. Some nausea, rare vomiting - describes it as a \"spit-up.\" Coughing often at night. Using Rolaids 1-2 times daily.     Allergies are worsening. Unsure if it is due to the smoke in the air. Some wheezes with shortness of breath. She does not smoke. No known asthma or COPD. She is taking Zyrtec daily. Often coughing at night.     Taking Vit D supplements. Would like her levels checked.     Social History     Tobacco Use    Smoking " status: Never     Passive exposure: Never    Smokeless tobacco: Never   Vaping Use    Vaping status: Never Used   Substance Use Topics    Alcohol use: Not Currently    Drug use: Not Currently         8/10/2022     3:00 PM 8/21/2023    11:16 AM 1/31/2024     9:00 AM   PHQ   PHQ-9 Total Score 12 8 13   Q9: Thoughts of better off dead/self-harm past 2 weeks Not at all Not at all  Not at all       Proxy-reported         12/4/2024     1:44 PM 3/4/2025     3:15 PM 6/4/2025     1:03 PM   ABBY-7 SCORE   Total Score 9 (mild anxiety) 8 (mild anxiety) 10 (moderate anxiety)   Total Score 9  8  10        Patient-reported         1/31/2024     9:00 AM   Last PHQ-9   1.  Little interest or pleasure in doing things 2   2.  Feeling down, depressed, or hopeless 2   3.  Trouble falling or staying asleep, or sleeping too much 1   4.  Feeling tired or having little energy 2   5.  Poor appetite or overeating 1   6.  Feeling bad about yourself 2   7.  Trouble concentrating 3   8.  Moving slowly or restless 0   Q9: Thoughts of better off dead/self-harm past 2 weeks 0   PHQ-9 Total Score 13   Difficulty at work, home, or with people Very difficult         6/4/2025     1:03 PM   ABBY-7    1. Feeling nervous, anxious, or on edge 1   2. Not being able to stop or control worrying 1   3. Worrying too much about different things 1   4. Trouble relaxing 2   5. Being so restless that it is hard to sit still 2   6. Becoming easily annoyed or irritable 2   7. Feeling afraid, as if something awful might happen 1   ABBY-7 Total Score 10    If you checked any problems, how difficult have they made it for you to do your work, take care of things at home, or get along with other people? Somewhat difficult       Patient-reported         How many servings of fruits and vegetables do you eat daily?  4 or more  On average, how many sweetened beverages do you drink each day (Examples: soda, juice, sweet tea, etc.  Do NOT count diet or artificially sweetened  beverages)?   2  How many days per week do you exercise enough to make your heart beat faster? 7  How many minutes a day do you exercise enough to make your heart beat faster? 30 - 60  How many days per week do you miss taking your medication? 0      Review of Systems  Constitutional, HEENT, cardiovascular, pulmonary, gi and gu systems are negative, except as otherwise noted.      Objective    /82 (BP Location: Left arm, Patient Position: Sitting, Cuff Size: Adult Large)   Pulse 98   Temp 98.6  F (37  C) (Tympanic)   Resp 18   Wt 97.5 kg (214 lb 14.4 oz)   SpO2 99%   BMI 38.07 kg/m    Body mass index is 38.07 kg/m .  Physical Exam   GENERAL: alert and no distress  EYES: Eyes grossly normal to inspection, PERRL and conjunctivae and sclerae normal  HENT: ear canals and TM's normal, nose and mouth without ulcers or lesions  NECK: no adenopathy, no asymmetry, masses, or scars  RESP: lungs clear to auscultation - no rales, rhonchi or wheezes  CV: regular rate and rhythm, normal S1 S2, no S3 or S4, no murmur, click or rub, no peripheral edema  ABDOMEN: soft, nontender, no hepatosplenomegaly, no masses and bowel sounds normal  MS: no gross musculoskeletal defects noted, no edema  PSYCH: mentation appears normal, affect normal/bright    CXR in process    Signed Electronically by: Lissy Perez NP

## 2025-06-04 ENCOUNTER — RESULTS FOLLOW-UP (OUTPATIENT)
Dept: FAMILY MEDICINE | Facility: OTHER | Age: 25
End: 2025-06-04

## 2025-06-04 ENCOUNTER — OFFICE VISIT (OUTPATIENT)
Dept: FAMILY MEDICINE | Facility: OTHER | Age: 25
End: 2025-06-04
Attending: NURSE PRACTITIONER
Payer: COMMERCIAL

## 2025-06-04 ENCOUNTER — ANCILLARY PROCEDURE (OUTPATIENT)
Dept: GENERAL RADIOLOGY | Facility: OTHER | Age: 25
End: 2025-06-04
Attending: NURSE PRACTITIONER
Payer: COMMERCIAL

## 2025-06-04 VITALS
DIASTOLIC BLOOD PRESSURE: 82 MMHG | WEIGHT: 214.9 LBS | OXYGEN SATURATION: 99 % | HEART RATE: 98 BPM | BODY MASS INDEX: 38.07 KG/M2 | RESPIRATION RATE: 18 BRPM | TEMPERATURE: 98.6 F | SYSTOLIC BLOOD PRESSURE: 128 MMHG

## 2025-06-04 DIAGNOSIS — K21.00 GASTROESOPHAGEAL REFLUX DISEASE WITH ESOPHAGITIS WITHOUT HEMORRHAGE: ICD-10-CM

## 2025-06-04 DIAGNOSIS — F41.9 ANXIETY: ICD-10-CM

## 2025-06-04 DIAGNOSIS — R06.2 WHEEZES: ICD-10-CM

## 2025-06-04 DIAGNOSIS — F33.1 MODERATE RECURRENT MAJOR DEPRESSION (H): ICD-10-CM

## 2025-06-04 DIAGNOSIS — E55.9 VITAMIN D DEFICIENCY: ICD-10-CM

## 2025-06-04 DIAGNOSIS — J30.2 SEASONAL ALLERGIC RHINITIS, UNSPECIFIED TRIGGER: ICD-10-CM

## 2025-06-04 DIAGNOSIS — F90.9 ATTENTION DEFICIT HYPERACTIVITY DISORDER (ADHD), UNSPECIFIED ADHD TYPE: Primary | ICD-10-CM

## 2025-06-04 PROCEDURE — G0463 HOSPITAL OUTPT CLINIC VISIT: HCPCS

## 2025-06-04 PROCEDURE — 71046 X-RAY EXAM CHEST 2 VIEWS: CPT | Mod: 26 | Performed by: RADIOLOGY

## 2025-06-04 PROCEDURE — 36415 COLL VENOUS BLD VENIPUNCTURE: CPT | Mod: ZL | Performed by: NURSE PRACTITIONER

## 2025-06-04 PROCEDURE — 71046 X-RAY EXAM CHEST 2 VIEWS: CPT | Mod: TC

## 2025-06-04 RX ORDER — DEXTROAMPHETAMINE SACCHARATE, AMPHETAMINE ASPARTATE MONOHYDRATE, DEXTROAMPHETAMINE SULFATE AND AMPHETAMINE SULFATE 3.75; 3.75; 3.75; 3.75 MG/1; MG/1; MG/1; MG/1
15 CAPSULE, EXTENDED RELEASE ORAL DAILY
Qty: 30 CAPSULE | Refills: 0 | Status: SHIPPED | OUTPATIENT
Start: 2025-06-04

## 2025-06-04 RX ORDER — ALBUTEROL SULFATE 90 UG/1
2 INHALANT RESPIRATORY (INHALATION) EVERY 6 HOURS PRN
Qty: 18 G | Refills: 0 | Status: SHIPPED | OUTPATIENT
Start: 2025-06-04

## 2025-06-04 RX ORDER — DEXTROAMPHETAMINE SACCHARATE, AMPHETAMINE ASPARTATE MONOHYDRATE, DEXTROAMPHETAMINE SULFATE AND AMPHETAMINE SULFATE 3.75; 3.75; 3.75; 3.75 MG/1; MG/1; MG/1; MG/1
15 CAPSULE, EXTENDED RELEASE ORAL DAILY
Qty: 30 CAPSULE | Refills: 0 | Status: SHIPPED | OUTPATIENT
Start: 2025-07-03

## 2025-06-04 RX ORDER — FAMOTIDINE 20 MG/1
20 TABLET, FILM COATED ORAL 2 TIMES DAILY
Qty: 60 TABLET | Refills: 2 | Status: SHIPPED | OUTPATIENT
Start: 2025-06-04

## 2025-06-04 RX ORDER — DEXTROAMPHETAMINE SACCHARATE, AMPHETAMINE ASPARTATE MONOHYDRATE, DEXTROAMPHETAMINE SULFATE AND AMPHETAMINE SULFATE 3.75; 3.75; 3.75; 3.75 MG/1; MG/1; MG/1; MG/1
15 CAPSULE, EXTENDED RELEASE ORAL DAILY
Qty: 30 CAPSULE | Refills: 0 | Status: SHIPPED | OUTPATIENT
Start: 2025-08-01

## 2025-06-04 RX ORDER — CETIRIZINE HYDROCHLORIDE 10 MG/1
10 TABLET ORAL DAILY
Qty: 90 TABLET | Refills: 3 | Status: SHIPPED | OUTPATIENT
Start: 2025-06-04

## 2025-06-04 RX ORDER — PREDNISONE 20 MG/1
40 TABLET ORAL DAILY
Qty: 10 TABLET | Refills: 0 | Status: SHIPPED | OUTPATIENT
Start: 2025-06-04 | End: 2025-06-09

## 2025-06-04 ASSESSMENT — PAIN SCALES - GENERAL: PAINLEVEL_OUTOF10: NO PAIN (0)

## 2025-06-04 ASSESSMENT — ANXIETY QUESTIONNAIRES
7. FEELING AFRAID AS IF SOMETHING AWFUL MIGHT HAPPEN: SEVERAL DAYS
6. BECOMING EASILY ANNOYED OR IRRITABLE: MORE THAN HALF THE DAYS
IF YOU CHECKED OFF ANY PROBLEMS ON THIS QUESTIONNAIRE, HOW DIFFICULT HAVE THESE PROBLEMS MADE IT FOR YOU TO DO YOUR WORK, TAKE CARE OF THINGS AT HOME, OR GET ALONG WITH OTHER PEOPLE: SOMEWHAT DIFFICULT
2. NOT BEING ABLE TO STOP OR CONTROL WORRYING: SEVERAL DAYS
GAD7 TOTAL SCORE: 10
1. FEELING NERVOUS, ANXIOUS, OR ON EDGE: SEVERAL DAYS
5. BEING SO RESTLESS THAT IT IS HARD TO SIT STILL: MORE THAN HALF THE DAYS
8. IF YOU CHECKED OFF ANY PROBLEMS, HOW DIFFICULT HAVE THESE MADE IT FOR YOU TO DO YOUR WORK, TAKE CARE OF THINGS AT HOME, OR GET ALONG WITH OTHER PEOPLE?: SOMEWHAT DIFFICULT
GAD7 TOTAL SCORE: 10
4. TROUBLE RELAXING: MORE THAN HALF THE DAYS
3. WORRYING TOO MUCH ABOUT DIFFERENT THINGS: SEVERAL DAYS

## 2025-06-07 ENCOUNTER — HOSPITAL ENCOUNTER (EMERGENCY)
Facility: HOSPITAL | Age: 25
Discharge: HOME | End: 2025-06-07
Attending: PHYSICIAN ASSISTANT | Admitting: PHYSICIAN ASSISTANT
Payer: COMMERCIAL

## 2025-06-07 VITALS
WEIGHT: 215 LBS | OXYGEN SATURATION: 97 % | BODY MASS INDEX: 38.09 KG/M2 | HEART RATE: 108 BPM | TEMPERATURE: 98.9 F | DIASTOLIC BLOOD PRESSURE: 85 MMHG | SYSTOLIC BLOOD PRESSURE: 131 MMHG | RESPIRATION RATE: 18 BRPM

## 2025-06-07 DIAGNOSIS — M79.10 MUSCLE SORENESS: ICD-10-CM

## 2025-06-07 PROCEDURE — G0463 HOSPITAL OUTPT CLINIC VISIT: HCPCS | Performed by: PHYSICIAN ASSISTANT

## 2025-06-07 PROCEDURE — 99213 OFFICE O/P EST LOW 20 MIN: CPT | Performed by: PHYSICIAN ASSISTANT

## 2025-06-07 ASSESSMENT — ENCOUNTER SYMPTOMS
COLOR CHANGE: 0
MYALGIAS: 1

## 2025-06-07 NOTE — ED TRIAGE NOTES
Pt presents with bilateral thigh pain x3 days. Pt stated she forgot to stretch prior to exercising causing pain. Pt has been taking tylenol 1000mg last dose yesterday.

## 2025-06-07 NOTE — DISCHARGE INSTRUCTIONS
Bilateral thigh pain correlated with muscle soreness due to your exercise activities.    Recommend healed but slides while lying flat and other stretching exercises.  Okay to also do light massage with a tennis ball or roller pin.    Over-the-counter pain control with Tylenol and/or ibuprofen    Light activities and using stairs recommended.

## 2025-06-07 NOTE — ED PROVIDER NOTES
History     Chief Complaint   Patient presents with    Leg Pain     HPI  Radha Mayfield is a 25 year old adult who presents to the urgent care with bilateral thigh muscle pain/soreness.  She has not worked out in a while, and decided to do squats and other thigh exercises 3 days ago unfortunately she has some significant soreness and pain with her thighs.  Was increased with using stairs or any type of walking.  Has tried ice/heat, and over-the-counter Tylenol, has not tried ibuprofen.  He did not have any specific injury while doing her exercises.  Does note that she did not focus on stretching and a good warm up prior.    Of note, she states that she is urinating yellow urine, not concentrated, no concerns or issues there.    Allergies:  Allergies   Allergen Reactions    No Clinical Screening - See Comments     Oxcarbazepine      rash    Chlorine Rash       Problem List:    Patient Active Problem List    Diagnosis Date Noted    Vitamin D deficiency 06/04/2025     Priority: Medium    Seasonal allergic rhinitis, unspecified trigger 06/04/2025     Priority: Medium    Gastroesophageal reflux disease with esophagitis without hemorrhage 06/04/2025     Priority: Medium    Moderate recurrent major depression (H) 03/04/2025     Priority: Medium    Vaginismus 10/20/2023     Priority: Medium    Pelvic floor dysfunction in female 10/20/2023     Priority: Medium    PTSD (post-traumatic stress disorder) 11/09/2022     Priority: Medium    Anxiety 11/09/2022     Priority: Medium    H/O absence seizures 10/16/2019     Priority: Medium    Attention deficit hyperactivity disorder (ADHD) 03/23/2007     Priority: Medium     IMO Update 10 2016          Past Medical History:    No past medical history on file.    Past Surgical History:    Past Surgical History:   Procedure Laterality Date    APPENDECTOMY      TONSILLECTOMY & ADENOIDECTOMY         Family History:    Family History   Problem Relation Age of Onset    Scoliosis Mother      Cardiovascular Mother     Endometriosis Mother     No Known Problems Father        Social History:  Marital Status:  Single [1]  Social History     Tobacco Use    Smoking status: Never     Passive exposure: Never    Smokeless tobacco: Never   Vaping Use    Vaping status: Never Used   Substance Use Topics    Alcohol use: Not Currently    Drug use: Not Currently        Medications:    albuterol (PROAIR HFA/PROVENTIL HFA/VENTOLIN HFA) 108 (90 Base) MCG/ACT inhaler  amphetamine-dextroamphetamine (ADDERALL XR) 15 MG 24 hr capsule  [START ON 7/3/2025] amphetamine-dextroamphetamine (ADDERALL XR) 15 MG 24 hr capsule  [START ON 8/1/2025] amphetamine-dextroamphetamine (ADDERALL XR) 15 MG 24 hr capsule  amphetamine-dextroamphetamine (ADDERALL XR) 15 MG 24 hr capsule  amphetamine-dextroamphetamine (ADDERALL XR) 15 MG 24 hr capsule  cetirizine (ZYRTEC) 10 MG tablet  escitalopram (LEXAPRO) 5 MG tablet  famotidine (PEPCID) 20 MG tablet  ondansetron (ZOFRAN ODT) 4 MG ODT tab  predniSONE (DELTASONE) 20 MG tablet  triamcinolone (KENALOG) 0.1 % external ointment          Review of Systems   Musculoskeletal:  Positive for myalgias.   Skin:  Negative for color change.   All other systems reviewed and are negative.      Physical Exam   BP: 131/85  Pulse: 108  Temp: 98.9  F (37.2  C)  Resp: 18  Weight: 97.5 kg (215 lb)  SpO2: 97 %      Physical Exam  Vitals and nursing note reviewed.       Nontoxic  NAD, comfortable  Awake, alert, appropriate interaction behavior  Nonlabored respiratory effort  Hemodynamically normal  Bilateral limping gait, but able to step up on the exam table step  Bilateral anterior thigh muscle tissue soreness to palpation, no masses or hematomas  Pain increased but 4/5 strength with knee extension, 5/5 strength for knee flexion  Skin warm, well-perfused      Assessments & Plan (with Medical Decision Making)   Radha Mayfield presents to the Urgent Care with diagnosis of bilateral thigh muscle soreness.    -  Attributed to overexertion with exercise after prolonged period of time without activity  - Encourage stretching and heel to buttock slides along with other stretching exercises for quadricep stretch    -Over-the-counter pain control    -Heat and ice treatment as needed  -No imaging warranted, as no specific injury  -Urinating/voiding normal, no concern for rhabdo, no labs needed      -Follow-Up: PCP as needed      -Patient verbally educated on their diagnoses and has no urther questions or concerns  -Patient understands to seek further medical evaluation if symptoms worsen      I have reviewed the nursing notes.    I have reviewed the findings, diagnosis, plan and need for follow up with the patient.      Discharge Medication List as of 6/7/2025 12:35 PM          Final diagnoses:   Muscle soreness       6/7/2025   [facility]       Nikolas Brown PA-C  06/07/25 1257       Nikolas Brown PA-C  06/07/25 1256

## 2025-06-16 ENCOUNTER — ALLIED HEALTH/NURSE VISIT (OUTPATIENT)
Dept: OBGYN | Facility: OTHER | Age: 25
End: 2025-06-16
Attending: NURSE PRACTITIONER
Payer: COMMERCIAL

## 2025-06-16 DIAGNOSIS — Z30.42 ENCOUNTER FOR SURVEILLANCE OF INJECTABLE CONTRACEPTIVE: Primary | ICD-10-CM

## 2025-06-16 PROCEDURE — 250N000011 HC RX IP 250 OP 636: Mod: JZ | Performed by: NURSE PRACTITIONER

## 2025-06-16 PROCEDURE — 96372 THER/PROPH/DIAG INJ SC/IM: CPT | Performed by: NURSE PRACTITIONER

## 2025-06-16 RX ADMIN — MEDROXYPROGESTERONE ACETATE 150 MG: 150 INJECTION, SUSPENSION INTRAMUSCULAR at 15:58

## 2025-06-18 NOTE — PROGRESS NOTES
"  Assessment & Plan     (R56.9) Seizure-like activity (H)  (primary encounter diagnosis)  Comment: h/o absence seizures; she notes that she often \"spaces out\"  Plan: CBC with platelets and differential,         Comprehensive metabolic panel (BMP + Alb, Alk         Phos, ALT, AST, Total. Bili, TP), Magnesium, UA        with Microscopic reflex to Culture - [place],         MR Brain w/o & w Contrast, TSH with free T4         reflex, Adult Neurology  Referral        Will update labs and order brain MRI.     Will also send to neurology at [facility].     In the meantime, she will return if symptoms worsen.       The longitudinal plan of care for the diagnosis(es)/condition(s) as documented were addressed during this visit. Due to the added complexity in care, I will continue to support Radha in the subsequent management and with ongoing continuity of care.    Devon Garcia is a 25 year old, presenting for the following health issues:  absent seizures    History of Present Illness       Reason for visit:  Absence seizures  Symptom onset:  More than a month  Symptoms include:  Spacing out for 30 seconds to a minute. Forgeting time afterwards  Symptom intensity:  Moderate  Symptom progression:  Staying the same  Had these symptoms before:  Yes  Has tried/received treatment for these symptoms:  Yes  Previous treatment was successful:  No  What makes it worse:  No  What makes it better:  Snapping out of it   Radha is taking medications regularly.        Concern - spacing out  Onset: intermittent since the age of 10  Description: having absent seizures lasting; 30 seconds to 1 minute   Intensity: severe  Progression of Symptoms:  daily   Accompanying Signs & Symptoms: none  Previous history of similar problem: H/O absence seizures  Precipitating factors:        Worsened by: none  Alleviating factors:        Improved by: none  Therapies tried and outcome: none  -some memory loss after the seizure-like " "activity  -no bowel or bladder incontinence  -no headaches  -she does not drive  -no chest pain or shortness of breath.     She does have anxiety and depression; feels this is controlled.       Review of Systems  Constitutional, HEENT, cardiovascular, pulmonary, gi and gu systems are negative, except as otherwise noted.      Objective    /80 (BP Location: Right arm, Patient Position: Sitting, Cuff Size: Adult Large)   Pulse (!) 122   Temp 98.7  F (37.1  C) (Tympanic)   Resp 20   Ht 1.6 m (5' 3\")   Wt 97.5 kg (215 lb)   SpO2 96%   BMI 38.09 kg/m    Body mass index is 38.09 kg/m .  Physical Exam   GENERAL: alert and no distress  EYES: Eyes grossly normal to inspection, PERRL and conjunctivae and sclerae normal  HENT: ear canals and TM's normal, nose and mouth without ulcers or lesions  NECK: no adenopathy, no asymmetry, masses, or scars  RESP: lungs clear to auscultation - no rales, rhonchi or wheezes  CV: regular rate and rhythm, normal S1 S2, no S3 or S4, no murmur, click or rub, no peripheral edema  ABDOMEN: soft, nontender, no hepatosplenomegaly, no masses and bowel sounds normal  MS: no gross musculoskeletal defects noted, no edema  NEURO: Normal strength and tone, sensory exam grossly normal, mentation intact, oriented times 3, speech normal, cranial nerves 2-12 intact, DTR's normal and symmetric, gait normal including heel/toe/tandem walking, Romberg normal, and rapid alternating movements normal  PSYCH: mentation appears normal, affect normal/bright    Labs in process        Signed Electronically by: Lissy Perez NP    "

## 2025-06-19 ENCOUNTER — OFFICE VISIT (OUTPATIENT)
Dept: FAMILY MEDICINE | Facility: OTHER | Age: 25
End: 2025-06-19
Attending: NURSE PRACTITIONER
Payer: COMMERCIAL

## 2025-06-19 ENCOUNTER — RESULTS FOLLOW-UP (OUTPATIENT)
Dept: FAMILY MEDICINE | Facility: OTHER | Age: 25
End: 2025-06-19

## 2025-06-19 VITALS
DIASTOLIC BLOOD PRESSURE: 80 MMHG | SYSTOLIC BLOOD PRESSURE: 130 MMHG | HEART RATE: 122 BPM | HEIGHT: 63 IN | RESPIRATION RATE: 20 BRPM | TEMPERATURE: 98.7 F | OXYGEN SATURATION: 96 % | WEIGHT: 215 LBS | BODY MASS INDEX: 38.09 KG/M2

## 2025-06-19 DIAGNOSIS — R56.9 SEIZURE-LIKE ACTIVITY (H): Primary | ICD-10-CM

## 2025-06-19 LAB
ALBUMIN SERPL BCG-MCNC: 4.1 G/DL (ref 3.5–5.2)
ALBUMIN UR-MCNC: 10 MG/DL
ALP SERPL-CCNC: 121 U/L (ref 40–150)
ALT SERPL W P-5'-P-CCNC: 20 U/L (ref 0–70)
ANION GAP SERPL CALCULATED.3IONS-SCNC: 11 MMOL/L (ref 7–15)
APPEARANCE UR: ABNORMAL
AST SERPL W P-5'-P-CCNC: 23 U/L (ref 0–45)
BACTERIA #/AREA URNS HPF: ABNORMAL /HPF
BASOPHILS # BLD MANUAL: 0 10E3/UL (ref 0–0.2)
BASOPHILS NFR BLD MANUAL: 0 %
BILIRUB SERPL-MCNC: 0.6 MG/DL
BILIRUB UR QL STRIP: NEGATIVE
BUN SERPL-MCNC: 7.6 MG/DL (ref 6–20)
CALCIUM SERPL-MCNC: 9.3 MG/DL (ref 8.8–10.4)
CHLORIDE SERPL-SCNC: 106 MMOL/L (ref 98–107)
COLOR UR AUTO: YELLOW
CREAT SERPL-MCNC: 0.7 MG/DL (ref 0.51–1.17)
EGFRCR SERPLBLD CKD-EPI 2021: >90 ML/MIN/1.73M2
EOSINOPHIL # BLD MANUAL: 0.2 10E3/UL (ref 0–0.7)
EOSINOPHIL NFR BLD MANUAL: 2 %
ERYTHROCYTE [DISTWIDTH] IN BLOOD BY AUTOMATED COUNT: 13.4 % (ref 10–15)
GLUCOSE SERPL-MCNC: 107 MG/DL (ref 70–99)
GLUCOSE UR STRIP-MCNC: NEGATIVE MG/DL
HCO3 SERPL-SCNC: 24 MMOL/L (ref 22–29)
HCT VFR BLD AUTO: 42.6 % (ref 35–53)
HGB BLD-MCNC: 14.3 G/DL (ref 11.7–17.7)
HGB UR QL STRIP: NEGATIVE
KETONES UR STRIP-MCNC: NEGATIVE MG/DL
LEUKOCYTE ESTERASE UR QL STRIP: ABNORMAL
LYMPHOCYTES # BLD MANUAL: 4.5 10E3/UL (ref 0.8–5.3)
LYMPHOCYTES NFR BLD MANUAL: 38 %
MAGNESIUM SERPL-MCNC: 2 MG/DL (ref 1.7–2.3)
MCH RBC QN AUTO: 28.9 PG (ref 26.5–33)
MCHC RBC AUTO-ENTMCNC: 33.6 G/DL (ref 31.5–36.5)
MCV RBC AUTO: 86 FL (ref 78–100)
MONOCYTES # BLD MANUAL: 0.7 10E3/UL (ref 0–1.3)
MONOCYTES NFR BLD MANUAL: 6 %
MUCOUS THREADS #/AREA URNS LPF: PRESENT /LPF
NEUTROPHILS # BLD MANUAL: 6.4 10E3/UL (ref 1.6–8.3)
NEUTROPHILS NFR BLD MANUAL: 54 %
NITRATE UR QL: NEGATIVE
PH UR STRIP: 6.5 [PH] (ref 4.7–8)
PLAT MORPH BLD: NORMAL
PLATELET # BLD AUTO: 434 10E3/UL (ref 150–450)
POTASSIUM SERPL-SCNC: 3.9 MMOL/L (ref 3.4–5.3)
PROT SERPL-MCNC: 7.6 G/DL (ref 6.4–8.3)
RBC # BLD AUTO: 4.94 10E6/UL (ref 3.8–5.9)
RBC MORPH BLD: NORMAL
RBC URINE: 0 /HPF
SODIUM SERPL-SCNC: 141 MMOL/L (ref 135–145)
SP GR UR STRIP: 1.02 (ref 1–1.03)
SQUAMOUS EPITHELIAL: 19 /HPF
TSH SERPL DL<=0.005 MIU/L-ACNC: 0.73 UIU/ML (ref 0.3–4.2)
UROBILINOGEN UR STRIP-MCNC: NORMAL MG/DL
WBC # BLD AUTO: 11.8 10E3/UL (ref 4–11)
WBC URINE: 3 /HPF

## 2025-06-19 PROCEDURE — 81003 URINALYSIS AUTO W/O SCOPE: CPT | Mod: ZL | Performed by: NURSE PRACTITIONER

## 2025-06-19 PROCEDURE — 83735 ASSAY OF MAGNESIUM: CPT | Mod: ZL | Performed by: NURSE PRACTITIONER

## 2025-06-19 PROCEDURE — 85007 BL SMEAR W/DIFF WBC COUNT: CPT | Mod: ZL | Performed by: NURSE PRACTITIONER

## 2025-06-19 PROCEDURE — 84443 ASSAY THYROID STIM HORMONE: CPT | Mod: ZL | Performed by: NURSE PRACTITIONER

## 2025-06-19 PROCEDURE — 85014 HEMATOCRIT: CPT | Mod: ZL | Performed by: NURSE PRACTITIONER

## 2025-06-19 PROCEDURE — G0463 HOSPITAL OUTPT CLINIC VISIT: HCPCS

## 2025-06-19 PROCEDURE — 36415 COLL VENOUS BLD VENIPUNCTURE: CPT | Mod: ZL | Performed by: NURSE PRACTITIONER

## 2025-06-19 PROCEDURE — 82040 ASSAY OF SERUM ALBUMIN: CPT | Mod: ZL | Performed by: NURSE PRACTITIONER

## 2025-06-19 ASSESSMENT — PATIENT HEALTH QUESTIONNAIRE - PHQ9
10. IF YOU CHECKED OFF ANY PROBLEMS, HOW DIFFICULT HAVE THESE PROBLEMS MADE IT FOR YOU TO DO YOUR WORK, TAKE CARE OF THINGS AT HOME, OR GET ALONG WITH OTHER PEOPLE: SOMEWHAT DIFFICULT
SUM OF ALL RESPONSES TO PHQ QUESTIONS 1-9: 9
SUM OF ALL RESPONSES TO PHQ QUESTIONS 1-9: 9

## 2025-06-19 ASSESSMENT — PAIN SCALES - GENERAL: PAINLEVEL_OUTOF10: NO PAIN (0)

## 2025-06-23 ENCOUNTER — HOSPITAL ENCOUNTER (OUTPATIENT)
Dept: EDUCATION SERVICES | Facility: HOSPITAL | Age: 25
Discharge: HOME | End: 2025-06-23
Attending: NURSE PRACTITIONER | Admitting: NURSE PRACTITIONER
Payer: COMMERCIAL

## 2025-06-23 VITALS — BODY MASS INDEX: 37.76 KG/M2 | HEIGHT: 63 IN | WEIGHT: 213.1 LBS

## 2025-06-23 PROCEDURE — 97803 MED NUTRITION INDIV SUBSEQ: CPT | Performed by: DIETITIAN, REGISTERED

## 2025-06-23 NOTE — PROGRESS NOTES
"[facility]  Medical Nutrition Therapy    Visit Type: Follow-up    Patient Referred by: Lissy Perez NP      Referred Diagnosis:   E66.812 (ICD-10-CM) - Obesity, Class II, BMI 35-39.9, no comorbidity   Z86.59 (ICD-10-CM) - Hx of anorexia nervosa          Nutrition Assessment  Anthropometrics:  Height: 5' 3\"  BMI: Body mass index is 37.75 kg/m .    Weight: 213 lbs 1.6 oz  Weight Change: gaining since January 2025     Wt Readings from Last 10 Encounters:   06/23/25 96.7 kg (213 lb 1.6 oz)   06/19/25 97.5 kg (215 lb)   06/07/25 97.5 kg (215 lb)   06/04/25 97.5 kg (214 lb 14.4 oz)   03/04/25 95.1 kg (209 lb 11.2 oz)   02/10/25 93.9 kg (207 lb 1.6 oz)   01/07/25 92.1 kg (203 lb)   12/04/24 96.8 kg (213 lb 8 oz)   10/24/24 94 kg (207 lb 4.8 oz)   09/04/24 94.3 kg (208 lb)        Nutrition History:  Is under more stress lately. Having seizures more often. Waiting for some imaging. May not be able to drive. Boyfriend was unemployed but recently got a job. Asthma and allergies are affecting her more. Snacking more on sweets with the increased stress. Trying to increase activity.      Food Record:  Breakfast: smoothie bowl  Lunch: sandwich  Dinner: chicken  Snack: snacking more with stress- sweets  Beverages: water, decaf coffee, rarely drinking soda    Physical Activity:  Walking everywhere. Stopped exercising for a short time due to pulling a muscle in there leg (4 days was having some pain).     Medical History/Family History:  limited    Medications:  Current Outpatient Medications   Medication Sig Dispense Refill    albuterol (PROAIR HFA/PROVENTIL HFA/VENTOLIN HFA) 108 (90 Base) MCG/ACT inhaler Inhale 2 puffs into the lungs every 6 hours as needed for shortness of breath, wheezing or cough. 18 g 0    amphetamine-dextroamphetamine (ADDERALL XR) 15 MG 24 hr capsule Take 1 capsule (15 mg) by mouth daily. 30 capsule 0    [START ON 7/3/2025] amphetamine-dextroamphetamine (ADDERALL XR) 15 MG 24 hr capsule " Take 1 capsule (15 mg) by mouth daily. 30 capsule 0    [START ON 8/1/2025] amphetamine-dextroamphetamine (ADDERALL XR) 15 MG 24 hr capsule Take 1 capsule (15 mg) by mouth daily. 30 capsule 0    amphetamine-dextroamphetamine (ADDERALL XR) 15 MG 24 hr capsule Take 1 capsule (15 mg) by mouth daily. 30 capsule 0    amphetamine-dextroamphetamine (ADDERALL XR) 15 MG 24 hr capsule Take 1 capsule (15 mg) by mouth daily. 30 capsule 0    cetirizine (ZYRTEC) 10 MG tablet Take 1 tablet (10 mg) by mouth daily. 90 tablet 3    escitalopram (LEXAPRO) 5 MG tablet Take 1 tablet (5 mg) by mouth daily      triamcinolone (KENALOG) 0.1 % external ointment Apply topically 2 times daily. 30 g 0     Current Facility-Administered Medications   Medication Dose Route Frequency Provider Last Rate Last Admin    medroxyPROGESTERone (DEPO-PROVERA) injection 150 mg  150 mg Intramuscular Q90 Days Cassie Wilson NP   150 mg at 01/09/25 1129    medroxyPROGESTERone (DEPO-PROVERA) injection 150 mg  150 mg Intramuscular Q90 Days Cassie Wilson NP   150 mg at 06/16/25 1558        Allergies:     Allergies   Allergen Reactions    No Clinical Screening - See Comments     Oxcarbazepine      rash    Chlorine Rash        Nutrition Education:  Continue working on lifestyle changes    Nutrition Goals:  Planning healthy snacks- protein with fruit/veg    Nutrition Follow-up/ Monitoring:  Food recall, weight, labs      Time spent with pt: 20 min  Follow up with RD in 9/24/25.   Patient has RD's contact information to call/email if needed.      Karime Toro RD

## 2025-07-13 ENCOUNTER — HEALTH MAINTENANCE LETTER (OUTPATIENT)
Age: 25
End: 2025-07-13

## 2025-07-16 ENCOUNTER — HOSPITAL ENCOUNTER (OUTPATIENT)
Dept: MRI IMAGING | Facility: HOSPITAL | Age: 25
Discharge: HOME | End: 2025-07-16
Attending: NURSE PRACTITIONER
Payer: COMMERCIAL

## 2025-07-16 DIAGNOSIS — R56.9 SEIZURE-LIKE ACTIVITY (H): ICD-10-CM

## 2025-07-16 PROCEDURE — 70553 MRI BRAIN STEM W/O & W/DYE: CPT

## 2025-07-16 PROCEDURE — 70553 MRI BRAIN STEM W/O & W/DYE: CPT | Mod: 26 | Performed by: RADIOLOGY

## 2025-07-16 PROCEDURE — A9585 GADOBUTROL INJECTION: HCPCS | Performed by: RADIOLOGY

## 2025-07-16 PROCEDURE — 255N000002 HC RX 255 OP 636: Performed by: RADIOLOGY

## 2025-07-16 RX ORDER — GADOBUTROL 604.72 MG/ML
10 INJECTION INTRAVENOUS ONCE
Status: COMPLETED | OUTPATIENT
Start: 2025-07-16 | End: 2025-07-16

## 2025-07-16 RX ADMIN — GADOBUTROL 10 ML: 604.72 INJECTION INTRAVENOUS at 15:45

## 2025-09-02 DIAGNOSIS — F90.9 ATTENTION DEFICIT HYPERACTIVITY DISORDER (ADHD), UNSPECIFIED ADHD TYPE: ICD-10-CM

## 2025-09-02 RX ORDER — DEXTROAMPHETAMINE SACCHARATE, AMPHETAMINE ASPARTATE MONOHYDRATE, DEXTROAMPHETAMINE SULFATE AND AMPHETAMINE SULFATE 3.75; 3.75; 3.75; 3.75 MG/1; MG/1; MG/1; MG/1
15 CAPSULE, EXTENDED RELEASE ORAL DAILY
Qty: 30 CAPSULE | Refills: 0 | Status: SHIPPED | OUTPATIENT
Start: 2025-09-02

## 2025-09-04 ENCOUNTER — OFFICE VISIT (OUTPATIENT)
Dept: FAMILY MEDICINE | Facility: OTHER | Age: 25
End: 2025-09-04
Attending: NURSE PRACTITIONER
Payer: COMMERCIAL

## 2025-09-04 VITALS
DIASTOLIC BLOOD PRESSURE: 82 MMHG | BODY MASS INDEX: 38.26 KG/M2 | OXYGEN SATURATION: 97 % | WEIGHT: 216 LBS | TEMPERATURE: 98.3 F | SYSTOLIC BLOOD PRESSURE: 118 MMHG | HEART RATE: 96 BPM

## 2025-09-04 DIAGNOSIS — F90.9 ATTENTION DEFICIT HYPERACTIVITY DISORDER (ADHD), UNSPECIFIED ADHD TYPE: Primary | ICD-10-CM

## 2025-09-04 DIAGNOSIS — K21.00 GASTROESOPHAGEAL REFLUX DISEASE WITH ESOPHAGITIS WITHOUT HEMORRHAGE: ICD-10-CM

## 2025-09-04 DIAGNOSIS — F41.9 ANXIETY: ICD-10-CM

## 2025-09-04 DIAGNOSIS — F33.1 MODERATE RECURRENT MAJOR DEPRESSION (H): ICD-10-CM

## 2025-09-04 DIAGNOSIS — J30.2 SEASONAL ALLERGIC RHINITIS, UNSPECIFIED TRIGGER: ICD-10-CM

## 2025-09-04 DIAGNOSIS — R06.2 WHEEZES: ICD-10-CM

## 2025-09-04 PROCEDURE — G0463 HOSPITAL OUTPT CLINIC VISIT: HCPCS

## 2025-09-04 RX ORDER — DEXTROAMPHETAMINE SACCHARATE, AMPHETAMINE ASPARTATE MONOHYDRATE, DEXTROAMPHETAMINE SULFATE AND AMPHETAMINE SULFATE 3.75; 3.75; 3.75; 3.75 MG/1; MG/1; MG/1; MG/1
15 CAPSULE, EXTENDED RELEASE ORAL DAILY
Qty: 30 CAPSULE | Refills: 0 | Status: SHIPPED | OUTPATIENT
Start: 2025-10-31

## 2025-09-04 RX ORDER — ALBUTEROL SULFATE 90 UG/1
2 INHALANT RESPIRATORY (INHALATION) EVERY 6 HOURS PRN
Qty: 18 G | Refills: 0 | Status: SHIPPED | OUTPATIENT
Start: 2025-09-04

## 2025-09-04 RX ORDER — DEXTROAMPHETAMINE SACCHARATE, AMPHETAMINE ASPARTATE MONOHYDRATE, DEXTROAMPHETAMINE SULFATE AND AMPHETAMINE SULFATE 3.75; 3.75; 3.75; 3.75 MG/1; MG/1; MG/1; MG/1
15 CAPSULE, EXTENDED RELEASE ORAL DAILY
Qty: 30 CAPSULE | Refills: 0 | Status: SHIPPED | OUTPATIENT
Start: 2025-10-02

## 2025-09-04 RX ORDER — FAMOTIDINE 20 MG/1
20 TABLET, FILM COATED ORAL DAILY
Qty: 90 TABLET | Refills: 3 | Status: SHIPPED | OUTPATIENT
Start: 2025-09-04

## 2025-09-04 RX ORDER — DEXTROAMPHETAMINE SACCHARATE, AMPHETAMINE ASPARTATE MONOHYDRATE, DEXTROAMPHETAMINE SULFATE AND AMPHETAMINE SULFATE 3.75; 3.75; 3.75; 3.75 MG/1; MG/1; MG/1; MG/1
15 CAPSULE, EXTENDED RELEASE ORAL DAILY
Qty: 30 CAPSULE | Refills: 0 | Status: SHIPPED | OUTPATIENT
Start: 2025-11-28

## 2025-09-04 ASSESSMENT — ANXIETY QUESTIONNAIRES
6. BECOMING EASILY ANNOYED OR IRRITABLE: MORE THAN HALF THE DAYS
1. FEELING NERVOUS, ANXIOUS, OR ON EDGE: MORE THAN HALF THE DAYS
2. NOT BEING ABLE TO STOP OR CONTROL WORRYING: MORE THAN HALF THE DAYS
GAD7 TOTAL SCORE: 14
IF YOU CHECKED OFF ANY PROBLEMS ON THIS QUESTIONNAIRE, HOW DIFFICULT HAVE THESE PROBLEMS MADE IT FOR YOU TO DO YOUR WORK, TAKE CARE OF THINGS AT HOME, OR GET ALONG WITH OTHER PEOPLE: SOMEWHAT DIFFICULT
8. IF YOU CHECKED OFF ANY PROBLEMS, HOW DIFFICULT HAVE THESE MADE IT FOR YOU TO DO YOUR WORK, TAKE CARE OF THINGS AT HOME, OR GET ALONG WITH OTHER PEOPLE?: SOMEWHAT DIFFICULT
4. TROUBLE RELAXING: MORE THAN HALF THE DAYS
3. WORRYING TOO MUCH ABOUT DIFFERENT THINGS: MORE THAN HALF THE DAYS
7. FEELING AFRAID AS IF SOMETHING AWFUL MIGHT HAPPEN: MORE THAN HALF THE DAYS
7. FEELING AFRAID AS IF SOMETHING AWFUL MIGHT HAPPEN: MORE THAN HALF THE DAYS
GAD7 TOTAL SCORE: 14
GAD7 TOTAL SCORE: 14
5. BEING SO RESTLESS THAT IT IS HARD TO SIT STILL: MORE THAN HALF THE DAYS

## 2025-09-04 ASSESSMENT — ASTHMA QUESTIONNAIRES: ACT_TOTALSCORE: 20

## 2025-09-04 ASSESSMENT — PATIENT HEALTH QUESTIONNAIRE - PHQ9
SUM OF ALL RESPONSES TO PHQ QUESTIONS 1-9: 12
10. IF YOU CHECKED OFF ANY PROBLEMS, HOW DIFFICULT HAVE THESE PROBLEMS MADE IT FOR YOU TO DO YOUR WORK, TAKE CARE OF THINGS AT HOME, OR GET ALONG WITH OTHER PEOPLE: SOMEWHAT DIFFICULT
SUM OF ALL RESPONSES TO PHQ QUESTIONS 1-9: 12

## 2025-09-04 ASSESSMENT — PAIN SCALES - GENERAL: PAINLEVEL_OUTOF10: NO PAIN (0)

## (undated) RX ORDER — MEDROXYPROGESTERONE ACETATE 150 MG/ML
INJECTION, SUSPENSION INTRAMUSCULAR
Status: DISPENSED
Start: 2024-05-22

## (undated) RX ORDER — MEDROXYPROGESTERONE ACETATE 150 MG/ML
INJECTION, SUSPENSION INTRAMUSCULAR
Status: DISPENSED
Start: 2024-10-24

## (undated) RX ORDER — MEDROXYPROGESTERONE ACETATE 150 MG/ML
INJECTION, SUSPENSION INTRAMUSCULAR
Status: DISPENSED
Start: 2023-12-20

## (undated) RX ORDER — MEDROXYPROGESTERONE ACETATE 150 MG/ML
INJECTION, SUSPENSION INTRAMUSCULAR
Status: DISPENSED
Start: 2025-03-31

## (undated) RX ORDER — MEDROXYPROGESTERONE ACETATE 150 MG/ML
INJECTION, SUSPENSION INTRAMUSCULAR
Status: DISPENSED
Start: 2025-01-09

## (undated) RX ORDER — METHYLPREDNISOLONE ACETATE 80 MG/ML
INJECTION, SUSPENSION INTRA-ARTICULAR; INTRALESIONAL; INTRAMUSCULAR; SOFT TISSUE
Status: DISPENSED
Start: 2023-12-20

## (undated) RX ORDER — MEDROXYPROGESTERONE ACETATE 150 MG/ML
INJECTION, SUSPENSION INTRAMUSCULAR
Status: DISPENSED
Start: 2024-08-07

## (undated) RX ORDER — MEDROXYPROGESTERONE ACETATE 150 MG/ML
INJECTION, SUSPENSION INTRAMUSCULAR
Status: DISPENSED
Start: 2025-06-16

## (undated) RX ORDER — MEDROXYPROGESTERONE ACETATE 150 MG/ML
INJECTION, SUSPENSION INTRAMUSCULAR
Status: DISPENSED
Start: 2024-03-06